# Patient Record
Sex: FEMALE | Race: WHITE | NOT HISPANIC OR LATINO | Employment: OTHER | URBAN - METROPOLITAN AREA
[De-identification: names, ages, dates, MRNs, and addresses within clinical notes are randomized per-mention and may not be internally consistent; named-entity substitution may affect disease eponyms.]

---

## 2017-01-04 ENCOUNTER — HOSPITAL ENCOUNTER (OUTPATIENT)
Dept: RADIATION ONCOLOGY | Facility: HOSPITAL | Age: 42
Setting detail: RADIATION/ONCOLOGY SERIES
Discharge: HOME | End: 2017-01-04

## 2017-01-06 PROCEDURE — 77300 RADIATION THERAPY DOSE PLAN: CPT | Performed by: RADIOLOGY

## 2017-01-06 PROCEDURE — 77334 RADIATION TREATMENT AID(S): CPT | Performed by: RADIOLOGY

## 2017-01-06 PROCEDURE — 77295 3-D RADIOTHERAPY PLAN: CPT | Performed by: RADIOLOGY

## 2017-01-06 PROCEDURE — 77370 RADIATION PHYSICS CONSULT: CPT | Performed by: RADIOLOGY

## 2017-01-11 ENCOUNTER — DOCUMENTATION (OUTPATIENT)
Dept: CARDIAC SURGERY | Facility: CLINIC | Age: 42
End: 2017-01-11

## 2017-01-11 ENCOUNTER — HOSPITAL ENCOUNTER (OUTPATIENT)
Dept: RADIATION ONCOLOGY | Facility: HOSPITAL | Age: 42
Discharge: HOME | End: 2017-01-11

## 2017-01-11 DIAGNOSIS — C71.9 GLIOMA (HCC): Primary | ICD-10-CM

## 2017-01-11 PROCEDURE — 77372 SRS LINEAR BASED: CPT | Performed by: RADIOLOGY

## 2017-01-11 PROCEDURE — 77290 THER RAD SIMULAJ FIELD CPLX: CPT | Performed by: RADIOLOGY

## 2017-01-11 NOTE — PROGRESS NOTES
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    Surgeon: David Gregorio M.D.    Indications for treatment:   This is a 41-year-old female who is approximately 8 years subsequent to initial diagnosis of a low-grade glioma.  She was initially treated with surgical excision and did quite well.  She subsequent had progression with reoperation for a small lesion that showed enhancement and followed by radiation.  Again she did well following the have progression and is admitted at this time for stereotactic radiosurgery.      The risks and benefits of this procedure have been discussed with the patient.  Consent forms were given and signatures obtained from the patient.  All questions have been answered satisfactorily and the patient wished to proceed with the procedure.    Description of procedure:     The patient was admitted to the CyberKnife suite, placed on the treatment table and an aquaplast mask was fitted to the calvarium to be used during treatment.    MRI and CT scanning data set were performed and entered into the CyberKnife planning and contouring station.  The tumor and surrounding critical structures including brainstem; visual apparatus; and lens of the eye were identified and coutoured to be excluded from the radiation plan.  The area of interest  right inferior frontal lobe  was identified and outlined.  A treatment plan was formulated in conjunction with the radiation oncologist and radiation therapist.  The patient received a total of 1800Gyc in a single fraction.    On the day of treatment, the patient returned to the CyberKnife suite and was placed on the treatment table.  The Aquaplast mask was reapplied and the coordinates for treatment using CT and skull x-rays were confirmed.  The  treatment time was 35 minutes] minutes.  The patient tolerated the procedure very well incurring no neurological dysfunction or complications.  The patient was discharged from the CyberKnife Suite to be followed by neurosurgery and radiation therapy.    Complications: none

## 2017-01-12 DIAGNOSIS — C71.1 MALIGNANT NEOPLASM OF FRONTAL LOBE (HCC): Primary | ICD-10-CM

## 2017-01-20 ENCOUNTER — CONSULT (OUTPATIENT)
Dept: ONCOLOGY | Facility: CLINIC | Age: 42
End: 2017-01-20

## 2017-01-20 VITALS
OXYGEN SATURATION: 98 % | HEIGHT: 62 IN | HEART RATE: 90 BPM | TEMPERATURE: 98 F | RESPIRATION RATE: 20 BRPM | SYSTOLIC BLOOD PRESSURE: 125 MMHG | BODY MASS INDEX: 47.7 KG/M2 | WEIGHT: 259.2 LBS | DIASTOLIC BLOOD PRESSURE: 83 MMHG

## 2017-01-20 DIAGNOSIS — C71.9 GLIOMA (HCC): Primary | ICD-10-CM

## 2017-01-20 LAB
ALBUMIN SERPL-MCNC: 4.1 G/DL (ref 3.5–5)
ALBUMIN/GLOB SERPL: 1.5 G/DL (ref 1.5–2.5)
ALP SERPL-CCNC: 78 U/L (ref 46–116)
ALT SERPL W P-5'-P-CCNC: 41 U/L (ref 10–36)
ANION GAP SERPL CALCULATED.3IONS-SCNC: 12.4 MMOL/L (ref 3.6–11.2)
AST SERPL-CCNC: 29 U/L (ref 10–30)
BASOPHILS # BLD AUTO: 0.03 10*3/MM3 (ref 0–0.3)
BASOPHILS NFR BLD AUTO: 0.4 % (ref 0–2)
BILIRUB SERPL-MCNC: 0.4 MG/DL (ref 0.2–1.8)
BUN BLD-MCNC: 16 MG/DL (ref 7–21)
BUN/CREAT SERPL: 18.4 (ref 7–25)
CALCIUM SPEC-SCNC: 9.4 MG/DL (ref 7.7–10)
CHLORIDE SERPL-SCNC: 101 MMOL/L (ref 99–112)
CO2 SERPL-SCNC: 26.6 MMOL/L (ref 24.3–31.9)
CREAT BLD-MCNC: 0.87 MG/DL (ref 0.43–1.29)
DEPRECATED RDW RBC AUTO: 40 FL (ref 37–54)
EOSINOPHIL # BLD AUTO: 0.19 10*3/MM3 (ref 0–0.7)
EOSINOPHIL NFR BLD AUTO: 2.4 % (ref 0–5)
ERYTHROCYTE [DISTWIDTH] IN BLOOD BY AUTOMATED COUNT: 13.7 % (ref 11.5–14.5)
GFR SERPL CREATININE-BSD FRML MDRD: 71 ML/MIN/1.73
GLOBULIN UR ELPH-MCNC: 2.8 GM/DL
GLUCOSE BLD-MCNC: 288 MG/DL (ref 70–110)
HCT VFR BLD AUTO: 43.6 % (ref 37–47)
HGB BLD-MCNC: 15.1 G/DL (ref 12–16)
IMM GRANULOCYTES # BLD: 0.11 10*3/MM3 (ref 0–0.03)
IMM GRANULOCYTES NFR BLD: 1.4 % (ref 0–0.5)
LYMPHOCYTES # BLD AUTO: 2.55 10*3/MM3 (ref 1–3)
LYMPHOCYTES NFR BLD AUTO: 32 % (ref 21–51)
MCH RBC QN AUTO: 28.1 PG (ref 27–33)
MCHC RBC AUTO-ENTMCNC: 34.6 G/DL (ref 33–37)
MCV RBC AUTO: 81 FL (ref 80–94)
MONOCYTES # BLD AUTO: 0.62 10*3/MM3 (ref 0.1–0.9)
MONOCYTES NFR BLD AUTO: 7.8 % (ref 0–10)
NEUTROPHILS # BLD AUTO: 4.46 10*3/MM3 (ref 1.4–6.5)
NEUTROPHILS NFR BLD AUTO: 56 % (ref 30–70)
OSMOLALITY SERPL CALC.SUM OF ELEC: 291.1 MOSM/KG (ref 273–305)
PLATELET # BLD AUTO: 263 10*3/MM3 (ref 130–400)
PMV BLD AUTO: 10.1 FL (ref 6–10)
POTASSIUM BLD-SCNC: 4.5 MMOL/L (ref 3.5–5.3)
PROT SERPL-MCNC: 6.9 G/DL (ref 6–8)
RBC # BLD AUTO: 5.38 10*6/MM3 (ref 4.2–5.4)
SODIUM BLD-SCNC: 140 MMOL/L (ref 135–153)
WBC NRBC COR # BLD: 7.96 10*3/MM3 (ref 4.5–12.5)

## 2017-01-20 PROCEDURE — 99205 OFFICE O/P NEW HI 60 MIN: CPT | Performed by: INTERNAL MEDICINE

## 2017-01-20 PROCEDURE — 85025 COMPLETE CBC W/AUTO DIFF WBC: CPT | Performed by: INTERNAL MEDICINE

## 2017-01-20 PROCEDURE — 80053 COMPREHEN METABOLIC PANEL: CPT | Performed by: INTERNAL MEDICINE

## 2017-01-20 RX ORDER — ONDANSETRON 4 MG/1
4 TABLET, FILM COATED ORAL EVERY 8 HOURS PRN
Qty: 60 TABLET | Refills: 11 | Status: ON HOLD | OUTPATIENT
Start: 2017-01-20 | End: 2017-06-22

## 2017-01-20 RX ORDER — SULFAMETHOXAZOLE AND TRIMETHOPRIM 800; 160 MG/1; MG/1
TABLET ORAL
Qty: 12 TABLET | Refills: 5 | Status: SHIPPED | OUTPATIENT
Start: 2017-01-20 | End: 2017-06-20

## 2017-01-20 RX ORDER — TEMOZOLOMIDE 140 MG/1
CAPSULE ORAL
Qty: 5 CAPSULE | Refills: 5 | Status: SHIPPED | OUTPATIENT
Start: 2017-01-20 | End: 2017-06-20

## 2017-01-20 RX ORDER — PROCHLORPERAZINE MALEATE 10 MG
10 TABLET ORAL EVERY 6 HOURS PRN
Qty: 60 TABLET | Refills: 11 | Status: ON HOLD | OUTPATIENT
Start: 2017-01-20 | End: 2017-06-22

## 2017-01-20 RX ORDER — TEMOZOLOMIDE 180 MG/1
CAPSULE ORAL
Qty: 5 CAPSULE | Refills: 5 | Status: SHIPPED | OUTPATIENT
Start: 2017-01-20 | End: 2017-06-20

## 2017-01-20 NOTE — MR AVS SNAPSHOT
[facility] HEMATOLOGY  AND ONCOLOGY  [phone]                    Marcelina Alvarez   1/20/2017 9:30 AM   Consult    Dept Phone:  [phone]   Encounter #:  [id number]    Provider:  JEFFY Pinto MD   Department:  [facility] HEMATOLOGY  AND ONCOLOGY                Your Full Care Plan              Your Updated Medication List          This list is accurate as of: 1/20/17 10:19 AM.  Always use your most recent med list.                ARIPiprazole 5 MG tablet   Commonly known as:  ABILIFY       dexamethasone 4 MG tablet   Commonly known as:  DECADRON   Take 0.5 tablets by mouth 2 (Two) Times a Day With Meals.       gabapentin 600 MG tablet   Commonly known as:  NEURONTIN       lisinopril 20 MG tablet   Commonly known as:  PRINIVIL,ZESTRIL       LORazepam 0.5 MG tablet   Commonly known as:  ATIVAN       raNITIdine 150 MG tablet   Commonly known as:  ZANTAC       sertraline 100 MG tablet   Commonly known as:  ZOLOFT       valACYclovir 500 MG tablet   Commonly known as:  VALTREX               We Performed the Following     CBC & Differential     CBC Auto Differential     Comprehensive Metabolic Panel       You Were Diagnosed With        Codes Comments    Glioma    -  Primary ICD-10-CM: C71.9  ICD-9-CM: 191.1       Instructions     None    Patient Instructions History      Upcoming Appointments     Visit Type Date Time Department    NEW PT - ONCOLOGY 1/20/2017  9:30 AM [facility] ON[facility]    CK FU< 6 MONTHS 2/15/2017 11:30 AM [facility] RAD ONC JANEL    MR JANEL BRAIN W WO CONTRAST 2/21/2017 10:00 AM [facility]EL MRI    OFFICE VISIT 2/21/2017 11:30 AM [facility] NEUROSURG [facility]    LAB 2/23/2017  9:45 AM [facility]    FOLLOW UP 2/23/2017 10:15 AM [facility]      [employer] Signup     [facility] [employer] allows you to send messages to your doctor, view your test results, renew your prescriptions, schedule appointments, and more. To sign up, go to [employer] and click on the Sign Up Now  link in the New User? box. Enter your [employer] Activation Code exactly as it appears below along with the last four digits of your Social Security Number and your Date of Birth () to complete the sign-up process. If you do not sign up before the expiration date, you must request a new code.    [employer] Activation Code: [email or web address]  Expires: 2/3/2017 10:19 AM    If you have questions, you can email [email or web address] or call [phone] to talk to our [employer] staff. Remember, [employer] is NOT to be used for urgent needs. For medical emergencies, dial 911.               Other Info from Your Visit           Your Appointments     Feb 15, 2017 11:30 AM EST   CK FU<6 months with Lisseth Mckeon MD   [facility] (--)    [address] [id number] [phone]            2017 10:00 AM EST   MR janel brain w wo contrast with JANEL MRI 1   [facility] ([place])    [address] [address] [phone]           Arrive 30 minutes prior to exam time. Bring a list of medications currently taking. Wear clothing without metal snaps, zippers, or other metalic artifacts. Do not wear jewelry.            2017 11:30 AM EST   (Arrive by 11:15 AM EST)   Office Visit with David Gregorio MD   [facility] (--)    [address],  [address] [facility] [address]  [facility] [id number] [phone]           Arrive 15 minutes prior to appointment.            2017  9:45 AM EST   LAB with MARK NURSE LAB   [facility] HEMATOLOGY  AND ONCOLOGY ([place])    [address] [address]   [phone]            2017 10:15 AM EST   Follow Up with JEFFY Pinto MD   [facility] HEMATOLOGY  AND ONCOLOGY ([facility])    [address] [address]   [phone]           Arrive 15 minutes  "prior to appointment.              Allergies     Diamox [Acetazolamide]        Vital Signs     Blood Pressure Pulse Temperature Respirations Height Weight    125/83 90 98 °F (36.7 °C) (Oral) 20 62\" (157.5 cm) 259 lb 3.2 oz (118 kg)    Oxygen Saturation Body Mass Index Smoking Status             98% 47.41 kg/m2 Never Smoker         Problems and Diagnoses Noted     Glioma        "

## 2017-01-20 NOTE — PROGRESS NOTES
Name:  Marcelina Alvarez  :  1975  Date:  2017     REFERRING PHYSICIAN  David Gregorio MD    PRIMARY CARE PROVIDER  TAYLOR Heard    REASON FOR FOLLOWUP  1. Glioma      CHIEF COMPLAINT  Occasional headaches.    Dear Dr. Gregorio,    HISTORY OF PRESENT ILLNESS:   Thank you for referring Ms. Alvarez to our medical oncology clinic.  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 She was initially diagnosed in , and you performed a successful resection. She subsequently underwent localized XRT (but no chemotherapy). She did very well until , when she developed a localized recurrence; and, again, you performed a successful resection (this time without adjuvant XRT). She was again doing well until just recently, late , when she started to develop some mild forgetfulness and headaches; and a repeat MRI showed evidence of recurrent disease again. This time, it was felt that repeat surgery was not in her best interest. She underwent Cyberknife treatment in early 2017 and tolerated it well. She is now referred to our clinic to consider adjuvant chemotherapy (Temodar).    INTERIM HISTORY:  Ms. Alvarez presents to clinic today for initial consultation by herself. She confirms the above history. She is still having occasional headaches, but overall feels to be in her usual state of health. She tolerated the Cyberknife therapy in early 2017 with no issues.    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     Social  History Main Topics   • Smoking status: Never Smoker   • Smokeless tobacco: Not on file   • Alcohol use No   • Drug use: No   • Sexual activity: Not on file     Other Topics Concern   • Not on file     Social History Narrative       Family History   Problem Relation Age of Onset   • Hypertension Father      pulmonary       Allergies   Allergen Reactions   • Diamox [Acetazolamide]        Current Outpatient Prescriptions   Medication Sig Dispense Refill   • ARIPiprazole (ABILIFY) 5 MG tablet TK 1 T PO QD  3   • dexamethasone (DECADRON) 4 MG tablet Take 0.5 tablets by mouth 2 (Two) Times a Day With Meals. 40 tablet 1   • gabapentin (NEURONTIN) 600 MG tablet Take 800 mg by mouth 3 (Three) Times a Day.     • lisinopril (PRINIVIL,ZESTRIL) 20 MG tablet Take 20 mg by mouth daily.     • LORazepam (ATIVAN) 0.5 MG tablet Take 0.5 mg by mouth Every 8 (Eight) Hours As Needed for anxiety.     • raNITIdine (ZANTAC) 150 MG tablet Take 150 mg by mouth 2 (Two) Times a Day.     • sertraline (ZOLOFT) 100 MG tablet TK 1 AND 1/2 TS PO QD  3   • valACYclovir (VALTREX) 500 MG tablet Take 500 mg by mouth 2 (Two) Times a Day.     • ondansetron (ZOFRAN) 4 MG tablet Take 1 tablet by mouth Every 8 (Eight) Hours As Needed for nausea or vomiting. 60 tablet 11   • prochlorperazine (COMPAZINE) 10 MG tablet Take 1 tablet by mouth Every 6 (Six) Hours As Needed for nausea or vomiting. 60 tablet 11   • sulfamethoxazole-trimethoprim (BACTRIM DS) 800-160 MG per tablet Take one tablet three times a week (Mon/Wed/Friday). 12 tablet 5   • temozolomide (TEMODAR) 140 MG chemo capsule Take one, 140 mg tablet in combination with one, 180 mg tablet for a total dose of 320 mg on days 1-5 of a 28-day cycle 5 capsule 5   • temozolomide (TEMODAR) 180 MG chemo capsule Take one, 180 mg tablet in combination with one, 140 mg tablet for a total dose of 320 mg on days 1-5 of a 28-day cycle 5 capsule 5     No current facility-administered medications for this visit.   "      REVIEW OF SYSTEMS  CONSTITUTIONAL:  No fever, chills or night sweats.  EYES:  No blurry vision, diplopia or other vision changes.  ENT:  No hearing loss, nosebleeds or sore throat.  CARDIOVASCULAR:  No palpitations, arrhythmia, syncopal episodes or edema.  PULMONARY:  No hemoptysis, wheezing, chronic cough or shortness of breath.  GASTROINTESTINAL:  No nausea or vomiting.  No constipation or diarrhea.  No abdominal pain.  GENITOURINARY:  No hematuria, kidney stones or frequent urination.  MUSCULOSKELETAL:  No joint or back pains.  INTEGUMENTARY: No rashes or pruritus.  ENDOCRINE:  No excessive thirst or hot flashes.  HEMATOLOGIC:  No history of free bleeding, spontaneous bleeding or clotting.  IMMUNOLOGIC:  No allergies or frequent infections.  NEUROLOGIC: No numbness, tingling, seizures or weakness. Occasional forgetfulness and headaches, as per the HPI above.  PSYCHIATRIC:  No anxiety or depression.    PHYSICAL EXAMINATION    Visit Vitals   • /83   • Pulse 90   • Temp 98 °F (36.7 °C) (Oral)   • Resp 20   • Ht 62\" (157.5 cm)   • Wt 259 lb 3.2 oz (118 kg)   • SpO2 98%   • BMI 47.41 kg/m2       GENERAL:  A well-developed, well-nourished, white female in no acute distress.  HEENT:  Pupils equally round and reactive to light.  Extraocular muscles intact.  CARDIOVASCULAR:  Regular rate and rhythm.  No murmurs, gallops or rubs.  LUNGS:  Clear to auscultation bilaterally.  ABDOMEN:  Soft, nontender, nondistended with positive bowel sounds.  EXTREMITIES:  No clubbing, cyanosis or edema bilaterally.  SKIN:  No rashes or petechiae.  NEURO:  Cranial nerves grossly intact.  No focal deficits.  PSYCH:  Alert and oriented x3.    LABORATORY    Lab Results   Component Value Date    WBC 7.96 01/20/2017    HGB 15.1 01/20/2017    HCT 43.6 01/20/2017    MCV 81.0 01/20/2017     01/20/2017    NEUTROABS 4.46 01/20/2017       Lab Results   Component Value Date     01/20/2017    K 4.5 01/20/2017     " "01/20/2017    CO2 26.6 01/20/2017    BUN 16 01/20/2017    CREATININE 0.87 01/20/2017    GLUCOSE 288 (H) 01/20/2017    CALCIUM 9.4 01/20/2017    AST 29 01/20/2017    ALT 41 (H) 01/20/2017    ALKPHOS 78 01/20/2017    BILITOT 0.4 01/20/2017    PROTEINTOT 6.9 01/20/2017    ALBUMIN 4.10 01/20/2017       IMAGING  MRI brain with and without contrast (06/13/2016):  Impression: There are bilateral frontal changes related to prior surgery, right greater than left. There is some associated gliosis and minimal contrast enhancement in the superior leftward aspect of the surgical \"bed\". Most importantly, there has been no change since 02/22/2016.    MRI cyberknife brain with contrast (12/28/2016):  Impression: Areas of increased blood flow in the right frontal lobe in areas of abnormal contrast enhancement and therefore the abnormality seen on prior MRI is highly suspicious for tumor.    PATHOLOGY    IMPRESSION AND PLAN  Ms. Alvarez is a 42 y.o., white female with:  Glioma: Initially diagnosed in 2007, with recurrences in 2014 and, most recently, late 2016. Status post resection x 2 (in 2007 and 2014) and localized radiation x 2 (adjuvantly in 2007; and, most recently, a Cyberknife boost delivered in January 2017). I had a long discussion with the patient in clinic today regarding her diagnosis. Since a third resection was felt to not be in her best interest and she has now completed localized XRT, I agree with neurosurgery's recommendation for adjuvant chemotherapy. I discussed the potential risks vs. benefits with her today, and she is agreeable to proceeding. Rxs for a total of 320 mg Temodar were provided, to be taken days 1-5 of a q28-day cycle. She will hopefully be able to start the first cycle within the next 1-2 weeks. Particularly given the risk for thrombocytopenia with this treatment, we will monitor her CBCs closely (at least weekly) for now. We will see her back in clinic in one month (just prior to the start of the " second, q28-day cycle) with a CBC and CMP for a toxicity check. She will continue to follow up with neurosurgery with periodic repeat MRIs, as previously planned. The patient was in agreement with these plans.    It is a pleasure to participate in Ms. Devlin's care. Please do not hesitate to call with any questions or concerns that you may have.    A total of 60 minutes were spent coordinating this patient’s care in clinic today; 50 minutes of which were face-to-face with the patient, reviewing her medical history and counseling on the current treatment and followup plan.  All questions were answered to her satisfaction.    FOLLOW UP  Rxs for Temodar 150 mg/m2/day (patient dose: 320 mg days 1-5 of a q28 day cycle), prophylactic Bactrim and prn Zofran and compazine all provided today. Check repeat CBCs and CMPs on days 3 and 5 of cycle #1 and then weekly for now. Return to clinic in one month, just prior to the start of the 2nd cycle, with a CBC and CMP.        This document was electronically signed by JEFFY Pinto MD January 20, 2017 11:13 AM      CC: MD Lisseth Becerra MD Beverly Paige Neace, PA

## 2017-01-24 DIAGNOSIS — C71.9 GLIOMA (HCC): Primary | ICD-10-CM

## 2017-01-24 RX ORDER — TEMOZOLOMIDE 180 MG/1
CAPSULE ORAL
Qty: 5 CAPSULE | Refills: 5 | Status: SHIPPED | OUTPATIENT
Start: 2017-01-24 | End: 2017-02-28

## 2017-01-24 RX ORDER — TEMOZOLOMIDE 140 MG/1
CAPSULE ORAL
Qty: 5 CAPSULE | Refills: 5 | Status: SHIPPED | OUTPATIENT
Start: 2017-01-24 | End: 2017-02-28

## 2017-02-01 ENCOUNTER — LAB (OUTPATIENT)
Dept: ONCOLOGY | Facility: CLINIC | Age: 42
End: 2017-02-01

## 2017-02-01 VITALS
DIASTOLIC BLOOD PRESSURE: 85 MMHG | WEIGHT: 258 LBS | TEMPERATURE: 97.9 F | OXYGEN SATURATION: 96 % | HEART RATE: 87 BPM | BODY MASS INDEX: 47.19 KG/M2 | SYSTOLIC BLOOD PRESSURE: 150 MMHG | RESPIRATION RATE: 20 BRPM

## 2017-02-01 DIAGNOSIS — C71.9 GLIOMA (HCC): ICD-10-CM

## 2017-02-01 LAB
ALBUMIN SERPL-MCNC: 3.9 G/DL (ref 3.5–5)
ALBUMIN/GLOB SERPL: 1.3 G/DL (ref 1.5–2.5)
ALP SERPL-CCNC: 77 U/L (ref 46–116)
ALT SERPL W P-5'-P-CCNC: 49 U/L (ref 10–36)
ANION GAP SERPL CALCULATED.3IONS-SCNC: 12.7 MMOL/L (ref 3.6–11.2)
AST SERPL-CCNC: 40 U/L (ref 10–30)
BASOPHILS # BLD AUTO: 0.04 10*3/MM3 (ref 0–0.3)
BASOPHILS NFR BLD AUTO: 0.4 % (ref 0–2)
BILIRUB SERPL-MCNC: 0.5 MG/DL (ref 0.2–1.8)
BUN BLD-MCNC: 14 MG/DL (ref 7–21)
BUN/CREAT SERPL: 15.6 (ref 7–25)
CALCIUM SPEC-SCNC: 8.6 MG/DL (ref 7.7–10)
CHLORIDE SERPL-SCNC: 105 MMOL/L (ref 99–112)
CO2 SERPL-SCNC: 24.3 MMOL/L (ref 24.3–31.9)
CREAT BLD-MCNC: 0.9 MG/DL (ref 0.43–1.29)
DEPRECATED RDW RBC AUTO: 41 FL (ref 37–54)
EOSINOPHIL # BLD AUTO: 0.09 10*3/MM3 (ref 0–0.7)
EOSINOPHIL NFR BLD AUTO: 1 % (ref 0–5)
ERYTHROCYTE [DISTWIDTH] IN BLOOD BY AUTOMATED COUNT: 13.8 % (ref 11.5–14.5)
GFR SERPL CREATININE-BSD FRML MDRD: 68 ML/MIN/1.73
GLOBULIN UR ELPH-MCNC: 2.9 GM/DL
GLUCOSE BLD-MCNC: 281 MG/DL (ref 70–110)
HCT VFR BLD AUTO: 44.5 % (ref 37–47)
HGB BLD-MCNC: 15.4 G/DL (ref 12–16)
IMM GRANULOCYTES # BLD: 0.07 10*3/MM3 (ref 0–0.03)
IMM GRANULOCYTES NFR BLD: 0.8 % (ref 0–0.5)
LYMPHOCYTES # BLD AUTO: 3.37 10*3/MM3 (ref 1–3)
LYMPHOCYTES NFR BLD AUTO: 37.8 % (ref 21–51)
MCH RBC QN AUTO: 28.4 PG (ref 27–33)
MCHC RBC AUTO-ENTMCNC: 34.6 G/DL (ref 33–37)
MCV RBC AUTO: 82 FL (ref 80–94)
MONOCYTES # BLD AUTO: 0.55 10*3/MM3 (ref 0.1–0.9)
MONOCYTES NFR BLD AUTO: 6.2 % (ref 0–10)
NEUTROPHILS # BLD AUTO: 4.8 10*3/MM3 (ref 1.4–6.5)
NEUTROPHILS NFR BLD AUTO: 53.8 % (ref 30–70)
OSMOLALITY SERPL CALC.SUM OF ELEC: 293.7 MOSM/KG (ref 273–305)
PLATELET # BLD AUTO: 268 10*3/MM3 (ref 130–400)
PMV BLD AUTO: 10.6 FL (ref 6–10)
POTASSIUM BLD-SCNC: 3.7 MMOL/L (ref 3.5–5.3)
PROT SERPL-MCNC: 6.8 G/DL (ref 6–8)
RBC # BLD AUTO: 5.43 10*6/MM3 (ref 4.2–5.4)
SODIUM BLD-SCNC: 142 MMOL/L (ref 135–153)
WBC NRBC COR # BLD: 8.92 10*3/MM3 (ref 4.5–12.5)

## 2017-02-01 PROCEDURE — 80053 COMPREHEN METABOLIC PANEL: CPT | Performed by: INTERNAL MEDICINE

## 2017-02-01 PROCEDURE — 36415 COLL VENOUS BLD VENIPUNCTURE: CPT

## 2017-02-01 PROCEDURE — 85025 COMPLETE CBC W/AUTO DIFF WBC: CPT | Performed by: INTERNAL MEDICINE

## 2017-02-03 ENCOUNTER — LAB (OUTPATIENT)
Dept: ONCOLOGY | Facility: CLINIC | Age: 42
End: 2017-02-03

## 2017-02-03 DIAGNOSIS — C71.9 GLIOMA (HCC): ICD-10-CM

## 2017-02-03 LAB
ALBUMIN SERPL-MCNC: 4.2 G/DL (ref 3.5–5)
ALBUMIN/GLOB SERPL: 1.4 G/DL (ref 1.5–2.5)
ALP SERPL-CCNC: 79 U/L (ref 46–116)
ALT SERPL W P-5'-P-CCNC: 90 U/L (ref 10–36)
ANION GAP SERPL CALCULATED.3IONS-SCNC: 8.1 MMOL/L (ref 3.6–11.2)
AST SERPL-CCNC: 75 U/L (ref 10–30)
BASOPHILS # BLD AUTO: 0.04 10*3/MM3 (ref 0–0.3)
BASOPHILS NFR BLD AUTO: 0.6 % (ref 0–2)
BILIRUB SERPL-MCNC: 0.6 MG/DL (ref 0.2–1.8)
BUN BLD-MCNC: 13 MG/DL (ref 7–21)
BUN/CREAT SERPL: 14.1 (ref 7–25)
CALCIUM SPEC-SCNC: 9.2 MG/DL (ref 7.7–10)
CHLORIDE SERPL-SCNC: 104 MMOL/L (ref 99–112)
CO2 SERPL-SCNC: 26.9 MMOL/L (ref 24.3–31.9)
CREAT BLD-MCNC: 0.92 MG/DL (ref 0.43–1.29)
DEPRECATED RDW RBC AUTO: 40.2 FL (ref 37–54)
EOSINOPHIL # BLD AUTO: 0.13 10*3/MM3 (ref 0–0.7)
EOSINOPHIL NFR BLD AUTO: 1.8 % (ref 0–5)
ERYTHROCYTE [DISTWIDTH] IN BLOOD BY AUTOMATED COUNT: 13.5 % (ref 11.5–14.5)
GFR SERPL CREATININE-BSD FRML MDRD: 67 ML/MIN/1.73
GLOBULIN UR ELPH-MCNC: 2.9 GM/DL
GLUCOSE BLD-MCNC: 293 MG/DL (ref 70–110)
HCT VFR BLD AUTO: 44.6 % (ref 37–47)
HGB BLD-MCNC: 15.6 G/DL (ref 12–16)
IMM GRANULOCYTES # BLD: 0.05 10*3/MM3 (ref 0–0.03)
IMM GRANULOCYTES NFR BLD: 0.7 % (ref 0–0.5)
LYMPHOCYTES # BLD AUTO: 2.02 10*3/MM3 (ref 1–3)
LYMPHOCYTES NFR BLD AUTO: 27.8 % (ref 21–51)
MCH RBC QN AUTO: 28.5 PG (ref 27–33)
MCHC RBC AUTO-ENTMCNC: 35 G/DL (ref 33–37)
MCV RBC AUTO: 81.5 FL (ref 80–94)
MONOCYTES # BLD AUTO: 0.43 10*3/MM3 (ref 0.1–0.9)
MONOCYTES NFR BLD AUTO: 5.9 % (ref 0–10)
NEUTROPHILS # BLD AUTO: 4.59 10*3/MM3 (ref 1.4–6.5)
NEUTROPHILS NFR BLD AUTO: 63.2 % (ref 30–70)
OSMOLALITY SERPL CALC.SUM OF ELEC: 288.5 MOSM/KG (ref 273–305)
PLATELET # BLD AUTO: 246 10*3/MM3 (ref 130–400)
PMV BLD AUTO: 9.8 FL (ref 6–10)
POTASSIUM BLD-SCNC: 4.1 MMOL/L (ref 3.5–5.3)
PROT SERPL-MCNC: 7.1 G/DL (ref 6–8)
RBC # BLD AUTO: 5.47 10*6/MM3 (ref 4.2–5.4)
SODIUM BLD-SCNC: 139 MMOL/L (ref 135–153)
WBC NRBC COR # BLD: 7.26 10*3/MM3 (ref 4.5–12.5)

## 2017-02-03 PROCEDURE — 80053 COMPREHEN METABOLIC PANEL: CPT | Performed by: INTERNAL MEDICINE

## 2017-02-03 PROCEDURE — 85025 COMPLETE CBC W/AUTO DIFF WBC: CPT | Performed by: INTERNAL MEDICINE

## 2017-02-03 PROCEDURE — 36415 COLL VENOUS BLD VENIPUNCTURE: CPT

## 2017-02-08 ENCOUNTER — LAB (OUTPATIENT)
Dept: ONCOLOGY | Facility: CLINIC | Age: 42
End: 2017-02-08

## 2017-02-08 VITALS
RESPIRATION RATE: 20 BRPM | BODY MASS INDEX: 47.15 KG/M2 | HEART RATE: 91 BPM | SYSTOLIC BLOOD PRESSURE: 137 MMHG | WEIGHT: 257.8 LBS | DIASTOLIC BLOOD PRESSURE: 81 MMHG | OXYGEN SATURATION: 97 % | TEMPERATURE: 97.9 F

## 2017-02-08 DIAGNOSIS — C71.9 GLIOMA (HCC): ICD-10-CM

## 2017-02-08 LAB
ALBUMIN SERPL-MCNC: 4.1 G/DL (ref 3.5–5)
ALBUMIN/GLOB SERPL: 1.5 G/DL (ref 1.5–2.5)
ALP SERPL-CCNC: 83 U/L (ref 46–116)
ALT SERPL W P-5'-P-CCNC: 83 U/L (ref 10–36)
ANION GAP SERPL CALCULATED.3IONS-SCNC: 9.5 MMOL/L (ref 3.6–11.2)
AST SERPL-CCNC: 64 U/L (ref 10–30)
BASOPHILS # BLD AUTO: 0.06 10*3/MM3 (ref 0–0.3)
BASOPHILS NFR BLD AUTO: 0.8 % (ref 0–2)
BILIRUB SERPL-MCNC: 0.3 MG/DL (ref 0.2–1.8)
BUN BLD-MCNC: 10 MG/DL (ref 7–21)
BUN/CREAT SERPL: 12.2 (ref 7–25)
CALCIUM SPEC-SCNC: 8.9 MG/DL (ref 7.7–10)
CHLORIDE SERPL-SCNC: 103 MMOL/L (ref 99–112)
CO2 SERPL-SCNC: 25.5 MMOL/L (ref 24.3–31.9)
CREAT BLD-MCNC: 0.82 MG/DL (ref 0.43–1.29)
DEPRECATED RDW RBC AUTO: 41.2 FL (ref 37–54)
EOSINOPHIL # BLD AUTO: 0.2 10*3/MM3 (ref 0–0.7)
EOSINOPHIL NFR BLD AUTO: 2.6 % (ref 0–5)
ERYTHROCYTE [DISTWIDTH] IN BLOOD BY AUTOMATED COUNT: 13.8 % (ref 11.5–14.5)
GFR SERPL CREATININE-BSD FRML MDRD: 76 ML/MIN/1.73
GLOBULIN UR ELPH-MCNC: 2.8 GM/DL
GLUCOSE BLD-MCNC: 345 MG/DL (ref 70–110)
HCT VFR BLD AUTO: 44.6 % (ref 37–47)
HGB BLD-MCNC: 15.4 G/DL (ref 12–16)
IMM GRANULOCYTES # BLD: 0.08 10*3/MM3 (ref 0–0.03)
IMM GRANULOCYTES NFR BLD: 1.1 % (ref 0–0.5)
LYMPHOCYTES # BLD AUTO: 2.86 10*3/MM3 (ref 1–3)
LYMPHOCYTES NFR BLD AUTO: 37.8 % (ref 21–51)
MCH RBC QN AUTO: 28.5 PG (ref 27–33)
MCHC RBC AUTO-ENTMCNC: 34.5 G/DL (ref 33–37)
MCV RBC AUTO: 82.6 FL (ref 80–94)
MONOCYTES # BLD AUTO: 0.43 10*3/MM3 (ref 0.1–0.9)
MONOCYTES NFR BLD AUTO: 5.7 % (ref 0–10)
NEUTROPHILS # BLD AUTO: 3.94 10*3/MM3 (ref 1.4–6.5)
NEUTROPHILS NFR BLD AUTO: 52 % (ref 30–70)
OSMOLALITY SERPL CALC.SUM OF ELEC: 288.4 MOSM/KG (ref 273–305)
PLATELET # BLD AUTO: 239 10*3/MM3 (ref 130–400)
PMV BLD AUTO: 10.4 FL (ref 6–10)
POTASSIUM BLD-SCNC: 4.3 MMOL/L (ref 3.5–5.3)
PROT SERPL-MCNC: 6.9 G/DL (ref 6–8)
RBC # BLD AUTO: 5.4 10*6/MM3 (ref 4.2–5.4)
SODIUM BLD-SCNC: 138 MMOL/L (ref 135–153)
WBC NRBC COR # BLD: 7.57 10*3/MM3 (ref 4.5–12.5)

## 2017-02-08 PROCEDURE — 85025 COMPLETE CBC W/AUTO DIFF WBC: CPT | Performed by: INTERNAL MEDICINE

## 2017-02-08 PROCEDURE — 80053 COMPREHEN METABOLIC PANEL: CPT | Performed by: INTERNAL MEDICINE

## 2017-02-09 ENCOUNTER — TELEPHONE (OUTPATIENT)
Dept: NEUROSURGERY | Facility: CLINIC | Age: 42
End: 2017-02-09

## 2017-02-09 DIAGNOSIS — C71.9 GLIOMA (HCC): Primary | ICD-10-CM

## 2017-02-09 NOTE — TELEPHONE ENCOUNTER
----- Message from Ary Carlson sent at 2/9/2017 10:00 AM EST -----  Regarding: MRI ORDER/ROXY  Message;  Patient is scheduled 2/21/17 for an MRI brain w/wo. It needs to be with CyberKnife protocol. I called central scheduling to add that, but they said a new order would need to be put in with CyberKnife on it. Can someone put in a new order, please? Thanks.

## 2017-02-15 ENCOUNTER — HOSPITAL ENCOUNTER (OUTPATIENT)
Dept: RADIATION ONCOLOGY | Facility: HOSPITAL | Age: 42
Setting detail: RADIATION/ONCOLOGY SERIES
Discharge: HOME | End: 2017-02-15

## 2017-02-15 ENCOUNTER — OFFICE VISIT (OUTPATIENT)
Dept: RADIATION ONCOLOGY | Facility: HOSPITAL | Age: 42
End: 2017-02-15

## 2017-02-15 VITALS
DIASTOLIC BLOOD PRESSURE: 86 MMHG | TEMPERATURE: 97.9 F | HEIGHT: 62 IN | RESPIRATION RATE: 18 BRPM | BODY MASS INDEX: 47.37 KG/M2 | HEART RATE: 73 BPM | SYSTOLIC BLOOD PRESSURE: 136 MMHG | WEIGHT: 257.4 LBS

## 2017-02-15 DIAGNOSIS — C71.9 GLIOMA (HCC): Primary | ICD-10-CM

## 2017-02-15 PROCEDURE — G0463 HOSPITAL OUTPT CLINIC VISIT: HCPCS

## 2017-02-15 RX ORDER — GABAPENTIN 800 MG/1
800 TABLET ORAL 3 TIMES DAILY
COMMUNITY
Start: 2017-01-17

## 2017-02-15 RX ORDER — IBUPROFEN 800 MG/1
TABLET ORAL
Refills: 5 | COMMUNITY
Start: 2017-01-22 | End: 2017-06-20

## 2017-02-15 NOTE — PROGRESS NOTES
"FOLLOW UP NOTE    PATIENT:                                                      Marcelina Alvarez  MEDICAL RECORD #:                        [id number]  :                                                          1975  COMPLETION DATE:   2017  DIAGNOSIS:     Recurrent Glioma      BRIEF HISTORY:    This is Kim is a 42-year-old female who was found have recurrent glioma.  She underwent CyberKnife stereotactic radiosurgery to recurrent tumor on 2017 and received 18 Gray in 1 fraction.  She is here for follow-up and says her symptoms have improved    MEDICATIONS: Medication reconciliation for the patient was reviewed and confirmed in the electronic medical record.    Review of Systems   All other systems reviewed and are negative.      KPS 90%    Physical Exam   Constitutional: She is oriented to person, place, and time.   HENT:   Head: Normocephalic.   Eyes: EOM are normal. No scleral icterus.   Cardiovascular: Normal rate, regular rhythm and normal heart sounds.    Pulmonary/Chest: Effort normal and breath sounds normal.   Neurological: She is alert and oriented to person, place, and time. No cranial nerve deficit.   Nursing note and vitals reviewed.      VITAL SIGNS:   Vitals:    02/15/17 1105   BP: 136/86   Pulse: 73   Resp: 18   Temp: 97.9 °F (36.6 °C)   Weight: 257 lb 6.4 oz (117 kg)   Height: 62\" (157.5 cm)   PainSc: 0-No pain       The following portions of the patient's history were reviewed and updated as appropriate: allergies, current medications, past family history, past medical history, past social history, past surgical history and problem list.         No primary diagnosis found.    IMPRESSION:  No acute side effects of radiotherapy and clinically the patient is better    RECOMMENDATIONS:  Mrs. Alvarez will continue Temodar chemotherapy.  We will see her back as needed.  She plans to follow closely with Dr. Gregorio     Return for PRN.     Lisseth Mckeon MD    Errors in dictation may reflect " use of voice recognition software and not all errors in transcription may have been detected prior to signing.

## 2017-02-16 ENCOUNTER — LAB (OUTPATIENT)
Dept: ONCOLOGY | Facility: CLINIC | Age: 42
End: 2017-02-16

## 2017-02-16 VITALS
BODY MASS INDEX: 47.41 KG/M2 | SYSTOLIC BLOOD PRESSURE: 134 MMHG | OXYGEN SATURATION: 98 % | RESPIRATION RATE: 18 BRPM | WEIGHT: 259.2 LBS | DIASTOLIC BLOOD PRESSURE: 86 MMHG | HEART RATE: 91 BPM | TEMPERATURE: 96.9 F

## 2017-02-16 DIAGNOSIS — C71.9 GLIOMA (HCC): ICD-10-CM

## 2017-02-16 LAB
ALBUMIN SERPL-MCNC: 4.2 G/DL (ref 3.5–5)
ALBUMIN/GLOB SERPL: 1.4 G/DL (ref 1.5–2.5)
ALP SERPL-CCNC: 88 U/L (ref 35–104)
ALT SERPL W P-5'-P-CCNC: 67 U/L (ref 10–36)
ANION GAP SERPL CALCULATED.3IONS-SCNC: 8.7 MMOL/L (ref 3.6–11.2)
AST SERPL-CCNC: 48 U/L (ref 10–30)
BASOPHILS # BLD AUTO: 0.04 10*3/MM3 (ref 0–0.3)
BASOPHILS NFR BLD AUTO: 0.5 % (ref 0–2)
BILIRUB SERPL-MCNC: 0.4 MG/DL (ref 0.2–1.8)
BUN BLD-MCNC: 10 MG/DL (ref 7–21)
BUN/CREAT SERPL: 10.8 (ref 7–25)
CALCIUM SPEC-SCNC: 9.3 MG/DL (ref 7.7–10)
CHLORIDE SERPL-SCNC: 102 MMOL/L (ref 99–112)
CO2 SERPL-SCNC: 26.3 MMOL/L (ref 24.3–31.9)
CREAT BLD-MCNC: 0.93 MG/DL (ref 0.43–1.29)
DEPRECATED RDW RBC AUTO: 40.5 FL (ref 37–54)
EOSINOPHIL # BLD AUTO: 0.21 10*3/MM3 (ref 0–0.7)
EOSINOPHIL NFR BLD AUTO: 2.6 % (ref 0–5)
ERYTHROCYTE [DISTWIDTH] IN BLOOD BY AUTOMATED COUNT: 14.2 % (ref 11.5–14.5)
GFR SERPL CREATININE-BSD FRML MDRD: 66 ML/MIN/1.73
GLOBULIN UR ELPH-MCNC: 2.9 GM/DL
GLUCOSE BLD-MCNC: 352 MG/DL (ref 70–110)
HCT VFR BLD AUTO: 44.8 % (ref 37–47)
HGB BLD-MCNC: 15.6 G/DL (ref 12–16)
IMM GRANULOCYTES # BLD: 0.05 10*3/MM3 (ref 0–0.03)
IMM GRANULOCYTES NFR BLD: 0.6 % (ref 0–0.5)
LYMPHOCYTES # BLD AUTO: 2.82 10*3/MM3 (ref 1–3)
LYMPHOCYTES NFR BLD AUTO: 35.1 % (ref 21–51)
MCH RBC QN AUTO: 28.3 PG (ref 27–33)
MCHC RBC AUTO-ENTMCNC: 34.8 G/DL (ref 33–37)
MCV RBC AUTO: 81.3 FL (ref 80–94)
MONOCYTES # BLD AUTO: 0.51 10*3/MM3 (ref 0.1–0.9)
MONOCYTES NFR BLD AUTO: 6.4 % (ref 0–10)
NEUTROPHILS # BLD AUTO: 4.4 10*3/MM3 (ref 1.4–6.5)
NEUTROPHILS NFR BLD AUTO: 54.8 % (ref 30–70)
OSMOLALITY SERPL CALC.SUM OF ELEC: 286.9 MOSM/KG (ref 273–305)
PLATELET # BLD AUTO: 341 10*3/MM3 (ref 130–400)
PMV BLD AUTO: 10.1 FL (ref 6–10)
POTASSIUM BLD-SCNC: 4.5 MMOL/L (ref 3.5–5.3)
PROT SERPL-MCNC: 7.1 G/DL (ref 6–8)
RBC # BLD AUTO: 5.51 10*6/MM3 (ref 4.2–5.4)
SODIUM BLD-SCNC: 137 MMOL/L (ref 135–153)
WBC NRBC COR # BLD: 8.03 10*3/MM3 (ref 4.5–12.5)

## 2017-02-16 PROCEDURE — 85025 COMPLETE CBC W/AUTO DIFF WBC: CPT | Performed by: INTERNAL MEDICINE

## 2017-02-16 PROCEDURE — 36415 COLL VENOUS BLD VENIPUNCTURE: CPT

## 2017-02-16 PROCEDURE — 80053 COMPREHEN METABOLIC PANEL: CPT | Performed by: INTERNAL MEDICINE

## 2017-02-21 ENCOUNTER — APPOINTMENT (OUTPATIENT)
Dept: MRI IMAGING | Facility: HOSPITAL | Age: 42
End: 2017-02-21

## 2017-02-21 RX ORDER — FLUCONAZOLE 100 MG/1
100 TABLET ORAL DAILY
Qty: 7 TABLET | Refills: 0 | Status: SHIPPED | OUTPATIENT
Start: 2017-02-21 | End: 2017-02-28

## 2017-02-23 ENCOUNTER — OFFICE VISIT (OUTPATIENT)
Dept: ONCOLOGY | Facility: CLINIC | Age: 42
End: 2017-02-23

## 2017-02-23 ENCOUNTER — LAB (OUTPATIENT)
Dept: ONCOLOGY | Facility: CLINIC | Age: 42
End: 2017-02-23

## 2017-02-23 VITALS
DIASTOLIC BLOOD PRESSURE: 79 MMHG | SYSTOLIC BLOOD PRESSURE: 136 MMHG | BODY MASS INDEX: 47.12 KG/M2 | TEMPERATURE: 98 F | HEART RATE: 79 BPM | RESPIRATION RATE: 18 BRPM | WEIGHT: 257.6 LBS | OXYGEN SATURATION: 98 %

## 2017-02-23 DIAGNOSIS — C71.9 GLIOMA (HCC): ICD-10-CM

## 2017-02-23 LAB
ALBUMIN SERPL-MCNC: 4.1 G/DL (ref 3.5–5)
ALBUMIN/GLOB SERPL: 1.5 G/DL (ref 1.5–2.5)
ALP SERPL-CCNC: 73 U/L (ref 35–104)
ALT SERPL W P-5'-P-CCNC: 64 U/L (ref 10–36)
ANION GAP SERPL CALCULATED.3IONS-SCNC: 7.1 MMOL/L (ref 3.6–11.2)
AST SERPL-CCNC: 49 U/L (ref 10–30)
BASOPHILS # BLD AUTO: 0.02 10*3/MM3 (ref 0–0.3)
BASOPHILS NFR BLD AUTO: 0.3 % (ref 0–2)
BILIRUB SERPL-MCNC: 0.6 MG/DL (ref 0.2–1.8)
BUN BLD-MCNC: 9 MG/DL (ref 7–21)
BUN/CREAT SERPL: 10.6 (ref 7–25)
CALCIUM SPEC-SCNC: 9 MG/DL (ref 7.7–10)
CHLORIDE SERPL-SCNC: 105 MMOL/L (ref 99–112)
CO2 SERPL-SCNC: 24.9 MMOL/L (ref 24.3–31.9)
CREAT BLD-MCNC: 0.85 MG/DL (ref 0.43–1.29)
DEPRECATED RDW RBC AUTO: 42.3 FL (ref 37–54)
EOSINOPHIL # BLD AUTO: 0.11 10*3/MM3 (ref 0–0.7)
EOSINOPHIL NFR BLD AUTO: 1.7 % (ref 0–5)
ERYTHROCYTE [DISTWIDTH] IN BLOOD BY AUTOMATED COUNT: 14.1 % (ref 11.5–14.5)
GFR SERPL CREATININE-BSD FRML MDRD: 73 ML/MIN/1.73
GLOBULIN UR ELPH-MCNC: 2.7 GM/DL
GLUCOSE BLD-MCNC: 322 MG/DL (ref 70–110)
HCT VFR BLD AUTO: 43.9 % (ref 37–47)
HGB BLD-MCNC: 15.4 G/DL (ref 12–16)
IMM GRANULOCYTES # BLD: 0.04 10*3/MM3 (ref 0–0.03)
IMM GRANULOCYTES NFR BLD: 0.6 % (ref 0–0.5)
LYMPHOCYTES # BLD AUTO: 2.51 10*3/MM3 (ref 1–3)
LYMPHOCYTES NFR BLD AUTO: 39.2 % (ref 21–51)
MCH RBC QN AUTO: 29.1 PG (ref 27–33)
MCHC RBC AUTO-ENTMCNC: 35.1 G/DL (ref 33–37)
MCV RBC AUTO: 83 FL (ref 80–94)
MONOCYTES # BLD AUTO: 0.46 10*3/MM3 (ref 0.1–0.9)
MONOCYTES NFR BLD AUTO: 7.2 % (ref 0–10)
NEUTROPHILS # BLD AUTO: 3.26 10*3/MM3 (ref 1.4–6.5)
NEUTROPHILS NFR BLD AUTO: 51 % (ref 30–70)
OSMOLALITY SERPL CALC.SUM OF ELEC: 284.9 MOSM/KG (ref 273–305)
PLATELET # BLD AUTO: 248 10*3/MM3 (ref 130–400)
PMV BLD AUTO: 10.3 FL (ref 6–10)
POTASSIUM BLD-SCNC: 4.4 MMOL/L (ref 3.5–5.3)
PROT SERPL-MCNC: 6.8 G/DL (ref 6–8)
RBC # BLD AUTO: 5.29 10*6/MM3 (ref 4.2–5.4)
SODIUM BLD-SCNC: 137 MMOL/L (ref 135–153)
WBC NRBC COR # BLD: 6.4 10*3/MM3 (ref 4.5–12.5)

## 2017-02-23 PROCEDURE — 85025 COMPLETE CBC W/AUTO DIFF WBC: CPT | Performed by: INTERNAL MEDICINE

## 2017-02-23 PROCEDURE — 99214 OFFICE O/P EST MOD 30 MIN: CPT | Performed by: INTERNAL MEDICINE

## 2017-02-23 PROCEDURE — 80053 COMPREHEN METABOLIC PANEL: CPT | Performed by: INTERNAL MEDICINE

## 2017-02-23 NOTE — PROGRESS NOTES
Name:  Marcelina Alvarez  :  1975  Date:  2017     REFERRING PHYSICIAN  David Gregorio MD    PRIMARY CARE PROVIDER  TAYLOR Heard    REASON FOR FOLLOWUP  1. Glioma      CHIEF COMPLAINT  None.    Dear Dr. Gregorio,    HISTORY OF PRESENT ILLNESS:   I saw Ms. Alvarez in follow up today in our medical oncology clinic. 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 She was initially diagnosed in , and you performed a successful resection. She subsequently underwent localized XRT (but no chemotherapy). She did very well until , when she developed a localized recurrence; and, again, you performed a successful resection (this time without adjuvant XRT). She was again doing well until late , when she started to develop some mild forgetfulness and headaches; and a repeat MRI showed evidence of recurrent disease again. This time, it was felt that repeat surgery was not in her best interest. She underwent Cyberknife treatment in early 2017 and tolerated it well. She was subsequently referred to our clinic to consider chemotherapy (Temodar). At the time of her initial appointment with us (on 2017), she was agreeable to this treatment (patient dose: 320 mg on days 1-5 of a 28-day cycle).    INTERIM HISTORY:  Ms. Alvarez presents to clinic today for follow up by herself. She tolerated the Cyberknife therapy in early 2017 with no issues. Her headaches resolved following this treatment. She took her first cycle (days 1-5) of Temodar from  - 2017 and overall tolerated it well also, with some mild and manageable episodes of nausea that week (and for a day or two after finishing Temodar) her only noticeable side effect. She overall feels well and has no specific complaints in clinic today.    Past Medical History   Diagnosis Date   • Brain tumor    • Hypertension    • Nerve damage       from shingles       Past Surgical History   Procedure Laterality Date   • Craniotomy  [id number]   • Cholecystectomy     • Endometrial ablation         Social History     Social History   • Marital status:      Spouse name: N/A   • Number of children: N/A   • Years of education: N/A     Occupational History   • Not on file.     Social History Main Topics   • Smoking status: Never Smoker   • Smokeless tobacco: Not on file   • Alcohol use No   • Drug use: No   • Sexual activity: Not on file     Other Topics Concern   • Not on file     Social History Narrative       Family History   Problem Relation Age of Onset   • Hypertension Father      pulmonary       Allergies   Allergen Reactions   • Diamox [Acetazolamide]        Current Outpatient Prescriptions   Medication Sig Dispense Refill   • ARIPiprazole (ABILIFY) 5 MG tablet TK 1 T PO QD  3   • dexamethasone (DECADRON) 4 MG tablet Take 0.5 tablets by mouth 2 (Two) Times a Day With Meals. 40 tablet 1   • fluconazole (DIFLUCAN) 100 MG tablet Take 1 tablet by mouth Daily. 7 tablet 0   • gabapentin (NEURONTIN) 800 MG tablet      • ibuprofen (ADVIL,MOTRIN) 800 MG tablet TK 1 T PO TID PRN  5   • lisinopril (PRINIVIL,ZESTRIL) 20 MG tablet Take 20 mg by mouth daily.     • LORazepam (ATIVAN) 0.5 MG tablet Take 0.5 mg by mouth Every 8 (Eight) Hours As Needed for anxiety.     • ondansetron (ZOFRAN) 4 MG tablet Take 1 tablet by mouth Every 8 (Eight) Hours As Needed for nausea or vomiting. 60 tablet 11   • prochlorperazine (COMPAZINE) 10 MG tablet Take 1 tablet by mouth Every 6 (Six) Hours As Needed for nausea or vomiting. 60 tablet 11   • raNITIdine (ZANTAC) 150 MG tablet Take 150 mg by mouth 2 (Two) Times a Day.     • sertraline (ZOLOFT) 100 MG tablet TK 1 AND 1/2 TS PO QD  3   • sulfamethoxazole-trimethoprim (BACTRIM DS) 800-160 MG per tablet Take one tablet three times a week (Mon/Wed/Friday). 12 tablet 5   • temozolomide (TEMODAR) 140 MG chemo capsule Take one capsule  on days 1-5 of a 28-day cycle (along with 180 mg for total dose of 320 mg). 5 capsule 5   • temozolomide (TEMODAR) 140 MG chemo capsule Take one 140 mg capsule in combination with one 180 mg capsule to equal total dose of 320 mg for days 1-5 of q28 day cycle 5 capsule 5   • temozolomide (TEMODAR) 180 MG chemo capsule Take one, 180 mg tablet in combination with one, 140 mg tablet for a total dose of 320 mg on days 1-5 of a 28-day cycle 5 capsule 5   • temozolomide (TEMODAR) 180 MG chemo capsule Take one 180 mg capsule in combination with one 140 mg capsule to equal a total of 320 mg for days 1-5 of a q28 day cycle 5 capsule 5   • valACYclovir (VALTREX) 500 MG tablet Take 500 mg by mouth 2 (Two) Times a Day.       No current facility-administered medications for this visit.        REVIEW OF SYSTEMS  CONSTITUTIONAL:  No fever, chills or night sweats.  EYES:  No blurry vision, diplopia or other vision changes.  ENT:  No hearing loss, nosebleeds or sore throat.  CARDIOVASCULAR:  No palpitations, arrhythmia, syncopal episodes or edema.  PULMONARY:  No hemoptysis, wheezing, chronic cough or shortness of breath.  GASTROINTESTINAL:  No constipation or diarrhea.  No abdominal pain. Mild, and manageable, nausea with Temodar (none for the remainder of the 28-day cycle).  GENITOURINARY:  No hematuria, kidney stones or frequent urination.  MUSCULOSKELETAL:  No joint or back pains.  INTEGUMENTARY: No rashes or pruritus.  ENDOCRINE:  No excessive thirst or hot flashes.  HEMATOLOGIC:  No history of free bleeding, spontaneous bleeding or clotting.  IMMUNOLOGIC:  No allergies or frequent infections.  NEUROLOGIC: No numbness, tingling, seizures or weakness. Resolved forgetfulness and headaches following Cyberknife therapy per the HPI above.  PSYCHIATRIC:  No anxiety or depression.    PHYSICAL EXAMINATION    Visit Vitals   • /79   • Pulse 79   • Temp 98 °F (36.7 °C) (Oral)   • Resp 18   • Wt 257 lb 9.6 oz (117 kg)   • SpO2 98%   •  "BMI 47.12 kg/m2       GENERAL:  A well-developed, well-nourished, obese, white female in no acute distress.  HEENT:  Pupils equally round and reactive to light.  Extraocular muscles intact.  CARDIOVASCULAR:  Regular rate and rhythm.  No murmurs, gallops or rubs.  LUNGS:  Clear to auscultation bilaterally.  ABDOMEN:  Soft, nontender, nondistended with positive bowel sounds.  EXTREMITIES:  No clubbing, cyanosis or edema bilaterally.  SKIN:  No rashes or petechiae.  NEURO:  Cranial nerves grossly intact.  No focal deficits.  PSYCH:  Alert and oriented x3.    LABORATORY    Lab Results   Component Value Date    WBC 6.40 02/23/2017    HGB 15.4 02/23/2017    HCT 43.9 02/23/2017    MCV 83.0 02/23/2017     02/23/2017    NEUTROABS 3.26 02/23/2017       Lab Results   Component Value Date     02/23/2017    K 4.4 02/23/2017     02/23/2017    CO2 24.9 02/23/2017    BUN 9 02/23/2017    CREATININE 0.85 02/23/2017    GLUCOSE 322 (H) 02/23/2017    CALCIUM 9.0 02/23/2017    AST 49 (H) 02/23/2017    ALT 64 (H) 02/23/2017    ALKPHOS 73 02/23/2017    BILITOT 0.6 02/23/2017    PROTEINTOT 6.8 02/23/2017    ALBUMIN 4.10 02/23/2017       CBC (02/23/2017): WBCs: 6.4; HgB: 15.4; Hct: 43.9; platelets: 248    IMAGING  MRI brain with and without contrast (06/13/2016):  Impression: There are bilateral frontal changes related to prior surgery, right greater than left. There is some associated gliosis and minimal contrast enhancement in the superior leftward aspect of the surgical \"bed\". Most importantly, there has been no change since 02/22/2016.    MRI cyberknife brain with contrast (12/28/2016):  Impression: Areas of increased blood flow in the right frontal lobe in areas of abnormal contrast enhancement and therefore the abnormality seen on prior MRI is highly suspicious for tumor.    PATHOLOGY    IMPRESSION AND PLAN  Ms. Alvarez is a 42 y.o., white female with:  1. Glioma: Initially diagnosed in 2007, with recurrences in 2014 " and, most recently, late 2016. Status post resection x 2 (in 2007 and 2014) and localized radiation x 2 (adjuvantly in 2007; and, most recently, a Cyberknife boost delivered in January 2017). I had a long discussion with the patient in clinic at the time of her initial appointment in our clinic (on 01/20/2017) regarding her diagnosis. Since a third resection was felt to not be in her best interest and she completed localized XRT, we agreed with neurosurgery's recommendation to subsequently start adjuvant chemotherapy. She began Temodar (patient dose: 320 mg daily on days 1-5 of a 28 day cycle) by late January 2017, taking her pills from 01/30 to 02/03/2017. She tolerated this treatment very well, with some mild, and manageable nausea her only noticeable side effect. Her CBCs have remained unremarkable, with no developing cytopenias (including today's). We will proceed with this current treatment plan (days 1-5 of cycle #2 scheduled for 02/27 - 03/03/2017), continue to monitor her CBCs routinely ([id number] for now) and see her back in clinic in one month (just prior to the start of the third cycle) for another symptom/toxicity check. She will also follow up with neurosurgery next week with a repeat MRI, as previously planned.  2. Nausea: Mild and manageable. The patient was advised to take Zofran scheduled once or twice a day (whether she feels nauseated or not) during the five days she takes Temodar (and perhaps for a day or two afterwards) to see if this might prevent some of her symptoms. Continue to monitor.  The patient was in agreement with these plans.    It is a pleasure to participate in Ms. Devlin's care. Please do not hesitate to call with any questions or concerns that you may have.    A total of 30 minutes were spent coordinating this patient’s care in clinic today; 25 minutes of which were face-to-face with the patient, reviewing her interim medical history, discussing the results of recent lab work and  counseling on the current treatment and followup plan.  All questions were answered to her satisfaction.    FOLLOW UP  Proceed with Temodar 150 mg/m2/day (patient dose: 320 mg days 1-5 of a q28 day cycle), prophylactic Bactrim and prn Zofran (day 1 of cycle #2 scheduled for 02/27/2017). Check repeat CBCs and CMPs [id number] for now. With neurosurgery with a repeat MRI next week, as previously planned. Return to our clinic in one month, just prior to the start of the 3rd cycle, with a CBC and CMP.        This document was electronically signed by JEFFY Pinto MD February 23, 2017 10:19 AM      CC: MD Lisseth Becerra MD Beverly Paige Neace, PA

## 2017-02-28 ENCOUNTER — HOSPITAL ENCOUNTER (OUTPATIENT)
Dept: MRI IMAGING | Facility: HOSPITAL | Age: 42
Discharge: HOME | End: 2017-02-28
Admitting: PHYSICIAN ASSISTANT

## 2017-02-28 ENCOUNTER — OFFICE VISIT (OUTPATIENT)
Dept: NEUROSURGERY | Facility: CLINIC | Age: 42
End: 2017-02-28

## 2017-02-28 VITALS
SYSTOLIC BLOOD PRESSURE: 146 MMHG | HEIGHT: 62 IN | WEIGHT: 256 LBS | DIASTOLIC BLOOD PRESSURE: 90 MMHG | TEMPERATURE: 98 F | BODY MASS INDEX: 47.11 KG/M2

## 2017-02-28 DIAGNOSIS — C71.9 GLIOMA (HCC): Primary | ICD-10-CM

## 2017-02-28 DIAGNOSIS — C71.9 GLIOMA (HCC): ICD-10-CM

## 2017-02-28 PROCEDURE — 0 GADOBENATE DIMEGLUMINE 529 MG/ML SOLUTION: Performed by: PHYSICIAN ASSISTANT

## 2017-02-28 PROCEDURE — 70553 MRI BRAIN STEM W/O & W/DYE: CPT

## 2017-02-28 PROCEDURE — A9577 INJ MULTIHANCE: HCPCS | Performed by: PHYSICIAN ASSISTANT

## 2017-02-28 PROCEDURE — 99024 POSTOP FOLLOW-UP VISIT: CPT | Performed by: NEUROLOGICAL SURGERY

## 2017-02-28 RX ADMIN — GADOBENATE DIMEGLUMINE 20 ML: 529 INJECTION, SOLUTION INTRAVENOUS at 11:30

## 2017-02-28 NOTE — PROGRESS NOTES
Marcelina Alvarez  1975  [id number]                       CURRENT WORKING DIAGNOSIS:  [Left frontal glioma ]         MEDICAL HISTORY SINCE LAST ENCOUNTER:  [ This 42-year-old female has had SRS and currently is on TMZ.  Neurologically she is doing well.  She takes no dexamethasone.  She denies headache.  She is here for follow-up.]           Past Medical History   Diagnosis Date   • Brain tumor    • Hypertension    • Nerve damage      from shingles              Past Surgical History   Procedure Laterality Date   • Craniotomy  [id number]   • Cholecystectomy     • Endometrial ablation                Family History   Problem Relation Age of Onset   • Hypertension Father      pulmonary              Social History     Social History   • Marital status:      Spouse name: N/A   • Number of children: N/A   • Years of education: N/A     Occupational History   • Not on file.     Social History Main Topics   • Smoking status: Never Smoker   • Smokeless tobacco: Not on file   • Alcohol use No   • Drug use: No   • Sexual activity: Not on file     Other Topics Concern   • Not on file     Social History Narrative              Allergies   Allergen Reactions   • Diamox [Acetazolamide]               Current Outpatient Prescriptions:   •  ARIPiprazole (ABILIFY) 5 MG tablet, TK 1 T PO QD, Disp: , Rfl: 3  •  gabapentin (NEURONTIN) 800 MG tablet, , Disp: , Rfl:   •  ibuprofen (ADVIL,MOTRIN) 800 MG tablet, TK 1 T PO TID PRN, Disp: , Rfl: 5  •  lisinopril (PRINIVIL,ZESTRIL) 20 MG tablet, Take 20 mg by mouth daily., Disp: , Rfl:   •  LORazepam (ATIVAN) 0.5 MG tablet, Take 0.5 mg by mouth Every 8 (Eight) Hours As Needed for anxiety., Disp: , Rfl:   •  ondansetron (ZOFRAN) 4 MG tablet, Take 1 tablet by mouth Every 8 (Eight) Hours As Needed for nausea or vomiting., Disp: 60 tablet, Rfl: 11  •  prochlorperazine (COMPAZINE) 10 MG tablet, Take 1 tablet by mouth Every 6 (Six) Hours As Needed for nausea or vomiting., Disp: 60 tablet, Rfl:  11  •  raNITIdine (ZANTAC) 150 MG tablet, Take 150 mg by mouth 2 (Two) Times a Day., Disp: , Rfl:   •  sertraline (ZOLOFT) 100 MG tablet, TK 1 AND 1/2 TS PO QD, Disp: , Rfl: 3  •  sulfamethoxazole-trimethoprim (BACTRIM DS) 800-160 MG per tablet, Take one tablet three times a week (Mon/Wed/Friday)., Disp: 12 tablet, Rfl: 5  •  temozolomide (TEMODAR) 140 MG chemo capsule, Take one capsule on days 1-5 of a 28-day cycle (along with 180 mg for total dose of 320 mg)., Disp: 5 capsule, Rfl: 5  •  temozolomide (TEMODAR) 180 MG chemo capsule, Take one, 180 mg tablet in combination with one, 140 mg tablet for a total dose of 320 mg on days 1-5 of a 28-day cycle, Disp: 5 capsule, Rfl: 5  •  valACYclovir (VALTREX) 500 MG tablet, Take 500 mg by mouth 2 (Two) Times a Day., Disp: , Rfl:   No current facility-administered medications for this visit.          Review of Systems   Constitutional: Positive for appetite change and fatigue. Negative for activity change, chills, diaphoresis, fever and unexpected weight change.   HENT: Negative for congestion, dental problem, drooling, ear discharge, ear pain, facial swelling, hearing loss, mouth sores, nosebleeds, postnasal drip, rhinorrhea, sinus pressure, sneezing, sore throat, tinnitus, trouble swallowing and voice change.    Eyes: Negative for photophobia, pain, discharge, redness, itching and visual disturbance.   Respiratory: Negative for apnea, cough, choking, chest tightness, shortness of breath, wheezing and stridor.    Cardiovascular: Negative for chest pain, palpitations and leg swelling.   Gastrointestinal: Negative for abdominal distention, abdominal pain, anal bleeding, blood in stool, constipation, diarrhea, nausea, rectal pain and vomiting.   Endocrine: Negative for cold intolerance, heat intolerance, polydipsia, polyphagia and polyuria.   Genitourinary: Negative for decreased urine volume, difficulty urinating, dysuria, enuresis, flank pain, frequency, genital sores,  "hematuria and urgency.   Musculoskeletal: Negative for arthralgias, back pain, gait problem, joint swelling, myalgias, neck pain and neck stiffness.   Skin: Negative for color change, pallor, rash and wound.   Allergic/Immunologic: Negative for environmental allergies, food allergies and immunocompromised state.   Neurological: Negative for dizziness, tremors, seizures, syncope, facial asymmetry, speech difficulty, weakness, light-headedness, numbness and headaches.   Hematological: Negative for adenopathy. Does not bruise/bleed easily.   Psychiatric/Behavioral: Negative for agitation, behavioral problems, confusion, decreased concentration, dysphoric mood, hallucinations, self-injury, sleep disturbance and suicidal ideas. The patient is not nervous/anxious and is not hyperactive.                Vitals:    02/28/17 1250   BP: 146/90   BP Location: Right arm   Patient Position: Sitting   Cuff Size: Adult   Temp: 98 °F (36.7 °C)   Weight: 256 lb (116 kg)   Height: 62\" (157.5 cm)               EXAMINATION: Alert and oriented.  No papilledema.  No weakness, sensory loss or reflex asymmetry.            MEDICAL DECISION MAKING: MRI of the brain is stable.  The intensity of the enhancement is somewhat lessened.  The flare seems to be about the same.           ASSESSMENT/DISPOSITION: I believe she has responded favorably to SRS and chemotherapy to this point.  We will continue to follow her and 8 weeks.  I have discussed the need for long-term TMZ              I APPRECIATE THE OPPORTUNITY OF THIS REFERRAL. PLEASE CALL IF ANY  QUESTIONS [phone]    Scribed for David Gregorio MD by Liza Hanley CMA. 2/28/2017  1:39 PM     I have read and concur with the information provided by the scribe.  David Gregorio MD    "

## 2017-03-13 ENCOUNTER — TELEPHONE (OUTPATIENT)
Dept: ONCOLOGY | Facility: HOSPITAL | Age: 42
End: 2017-03-13

## 2017-03-13 ENCOUNTER — LAB (OUTPATIENT)
Dept: ONCOLOGY | Facility: CLINIC | Age: 42
End: 2017-03-13

## 2017-03-13 VITALS
SYSTOLIC BLOOD PRESSURE: 136 MMHG | BODY MASS INDEX: 46.27 KG/M2 | DIASTOLIC BLOOD PRESSURE: 81 MMHG | RESPIRATION RATE: 18 BRPM | WEIGHT: 253 LBS | OXYGEN SATURATION: 99 % | TEMPERATURE: 97.5 F | HEART RATE: 95 BPM

## 2017-03-13 DIAGNOSIS — C71.9 GLIOMA (HCC): ICD-10-CM

## 2017-03-13 LAB
ALBUMIN SERPL-MCNC: 4.5 G/DL (ref 3.5–5)
ALBUMIN/GLOB SERPL: 1.7 G/DL (ref 1.5–2.5)
ALP SERPL-CCNC: 91 U/L (ref 35–104)
ALT SERPL W P-5'-P-CCNC: 65 U/L (ref 10–36)
ANION GAP SERPL CALCULATED.3IONS-SCNC: 7.2 MMOL/L (ref 3.6–11.2)
AST SERPL-CCNC: 45 U/L (ref 10–30)
BASOPHILS # BLD AUTO: 0.05 10*3/MM3 (ref 0–0.3)
BASOPHILS NFR BLD AUTO: 0.5 % (ref 0–2)
BILIRUB SERPL-MCNC: 0.5 MG/DL (ref 0.2–1.8)
BUN BLD-MCNC: 15 MG/DL (ref 7–21)
BUN/CREAT SERPL: 16 (ref 7–25)
CALCIUM SPEC-SCNC: 9.7 MG/DL (ref 7.7–10)
CHLORIDE SERPL-SCNC: 101 MMOL/L (ref 99–112)
CO2 SERPL-SCNC: 25.8 MMOL/L (ref 24.3–31.9)
CREAT BLD-MCNC: 0.94 MG/DL (ref 0.43–1.29)
DEPRECATED RDW RBC AUTO: 41.3 FL (ref 37–54)
EOSINOPHIL # BLD AUTO: 0.23 10*3/MM3 (ref 0–0.7)
EOSINOPHIL NFR BLD AUTO: 2.3 % (ref 0–5)
ERYTHROCYTE [DISTWIDTH] IN BLOOD BY AUTOMATED COUNT: 13.9 % (ref 11.5–14.5)
GFR SERPL CREATININE-BSD FRML MDRD: 65 ML/MIN/1.73
GLOBULIN UR ELPH-MCNC: 2.7 GM/DL
GLUCOSE BLD-MCNC: 365 MG/DL (ref 70–110)
HCT VFR BLD AUTO: 45 % (ref 37–47)
HGB BLD-MCNC: 15.7 G/DL (ref 12–16)
IMM GRANULOCYTES # BLD: 0.13 10*3/MM3 (ref 0–0.03)
IMM GRANULOCYTES NFR BLD: 1.3 % (ref 0–0.5)
LYMPHOCYTES # BLD AUTO: 3.09 10*3/MM3 (ref 1–3)
LYMPHOCYTES NFR BLD AUTO: 30.7 % (ref 21–51)
MCH RBC QN AUTO: 28.7 PG (ref 27–33)
MCHC RBC AUTO-ENTMCNC: 34.9 G/DL (ref 33–37)
MCV RBC AUTO: 82.3 FL (ref 80–94)
MONOCYTES # BLD AUTO: 0.72 10*3/MM3 (ref 0.1–0.9)
MONOCYTES NFR BLD AUTO: 7.2 % (ref 0–10)
NEUTROPHILS # BLD AUTO: 5.83 10*3/MM3 (ref 1.4–6.5)
NEUTROPHILS NFR BLD AUTO: 58 % (ref 30–70)
OSMOLALITY SERPL CALC.SUM OF ELEC: 283.9 MOSM/KG (ref 273–305)
PLATELET # BLD AUTO: 314 10*3/MM3 (ref 130–400)
PMV BLD AUTO: 10.5 FL (ref 6–10)
POTASSIUM BLD-SCNC: 4.4 MMOL/L (ref 3.5–5.3)
PROT SERPL-MCNC: 7.2 G/DL (ref 6–8)
RBC # BLD AUTO: 5.47 10*6/MM3 (ref 4.2–5.4)
SODIUM BLD-SCNC: 134 MMOL/L (ref 135–153)
WBC NRBC COR # BLD: 10.05 10*3/MM3 (ref 4.5–12.5)

## 2017-03-13 PROCEDURE — 36415 COLL VENOUS BLD VENIPUNCTURE: CPT | Performed by: INTERNAL MEDICINE

## 2017-03-13 PROCEDURE — 85025 COMPLETE CBC W/AUTO DIFF WBC: CPT | Performed by: INTERNAL MEDICINE

## 2017-03-13 PROCEDURE — 80053 COMPREHEN METABOLIC PANEL: CPT | Performed by: INTERNAL MEDICINE

## 2017-03-13 RX ORDER — FLUCONAZOLE 100 MG/1
100 TABLET ORAL DAILY
Qty: 7 TABLET | Refills: 0 | Status: SHIPPED | OUTPATIENT
Start: 2017-03-13 | End: 2017-03-20

## 2017-03-13 NOTE — TELEPHONE ENCOUNTER
----- Message from Jenniffer Ortiz MA sent at 3/13/2017  9:05 AM EDT -----  Patient need Diflucan called into [facility] in [facility]

## 2017-03-23 ENCOUNTER — OFFICE VISIT (OUTPATIENT)
Dept: ONCOLOGY | Facility: CLINIC | Age: 42
End: 2017-03-23

## 2017-03-23 ENCOUNTER — LAB (OUTPATIENT)
Dept: ONCOLOGY | Facility: CLINIC | Age: 42
End: 2017-03-23

## 2017-03-23 VITALS
DIASTOLIC BLOOD PRESSURE: 77 MMHG | WEIGHT: 252.5 LBS | HEART RATE: 86 BPM | OXYGEN SATURATION: 99 % | TEMPERATURE: 97.3 F | RESPIRATION RATE: 18 BRPM | BODY MASS INDEX: 46.18 KG/M2 | SYSTOLIC BLOOD PRESSURE: 144 MMHG

## 2017-03-23 DIAGNOSIS — C71.9 GLIOMA (HCC): ICD-10-CM

## 2017-03-23 LAB
ALBUMIN SERPL-MCNC: 4.3 G/DL (ref 3.5–5)
ALBUMIN/GLOB SERPL: 1.4 G/DL (ref 1.5–2.5)
ALP SERPL-CCNC: 86 U/L (ref 35–104)
ALT SERPL W P-5'-P-CCNC: 65 U/L (ref 10–36)
ANION GAP SERPL CALCULATED.3IONS-SCNC: 9.9 MMOL/L (ref 3.6–11.2)
AST SERPL-CCNC: 40 U/L (ref 10–30)
BASOPHILS # BLD AUTO: 0.02 10*3/MM3 (ref 0–0.3)
BASOPHILS NFR BLD AUTO: 0.4 % (ref 0–2)
BILIRUB SERPL-MCNC: 0.5 MG/DL (ref 0.2–1.8)
BUN BLD-MCNC: 9 MG/DL (ref 7–21)
BUN/CREAT SERPL: 10 (ref 7–25)
CALCIUM SPEC-SCNC: 9.5 MG/DL (ref 7.7–10)
CHLORIDE SERPL-SCNC: 103 MMOL/L (ref 99–112)
CO2 SERPL-SCNC: 22.1 MMOL/L (ref 24.3–31.9)
CREAT BLD-MCNC: 0.9 MG/DL (ref 0.43–1.29)
DEPRECATED RDW RBC AUTO: 42 FL (ref 37–54)
EOSINOPHIL # BLD AUTO: 0.15 10*3/MM3 (ref 0–0.7)
EOSINOPHIL NFR BLD AUTO: 2.7 % (ref 0–5)
ERYTHROCYTE [DISTWIDTH] IN BLOOD BY AUTOMATED COUNT: 14.2 % (ref 11.5–14.5)
GFR SERPL CREATININE-BSD FRML MDRD: 68 ML/MIN/1.73
GLOBULIN UR ELPH-MCNC: 3 GM/DL
GLUCOSE BLD-MCNC: 388 MG/DL (ref 70–110)
HCT VFR BLD AUTO: 43.6 % (ref 37–47)
HGB BLD-MCNC: 15.2 G/DL (ref 12–16)
IMM GRANULOCYTES # BLD: 0.03 10*3/MM3 (ref 0–0.03)
IMM GRANULOCYTES NFR BLD: 0.5 % (ref 0–0.5)
LYMPHOCYTES # BLD AUTO: 2.11 10*3/MM3 (ref 1–3)
LYMPHOCYTES NFR BLD AUTO: 37.5 % (ref 21–51)
MCH RBC QN AUTO: 28.7 PG (ref 27–33)
MCHC RBC AUTO-ENTMCNC: 34.9 G/DL (ref 33–37)
MCV RBC AUTO: 82.3 FL (ref 80–94)
MONOCYTES # BLD AUTO: 0.39 10*3/MM3 (ref 0.1–0.9)
MONOCYTES NFR BLD AUTO: 6.9 % (ref 0–10)
NEUTROPHILS # BLD AUTO: 2.92 10*3/MM3 (ref 1.4–6.5)
NEUTROPHILS NFR BLD AUTO: 52 % (ref 30–70)
OSMOLALITY SERPL CALC.SUM OF ELEC: 284.9 MOSM/KG (ref 273–305)
PLATELET # BLD AUTO: 249 10*3/MM3 (ref 130–400)
PMV BLD AUTO: 10.3 FL (ref 6–10)
POTASSIUM BLD-SCNC: 4.2 MMOL/L (ref 3.5–5.3)
PROT SERPL-MCNC: 7.3 G/DL (ref 6–8)
RBC # BLD AUTO: 5.3 10*6/MM3 (ref 4.2–5.4)
SODIUM BLD-SCNC: 135 MMOL/L (ref 135–153)
WBC NRBC COR # BLD: 5.62 10*3/MM3 (ref 4.5–12.5)

## 2017-03-23 PROCEDURE — 85025 COMPLETE CBC W/AUTO DIFF WBC: CPT | Performed by: INTERNAL MEDICINE

## 2017-03-23 PROCEDURE — 36415 COLL VENOUS BLD VENIPUNCTURE: CPT | Performed by: INTERNAL MEDICINE

## 2017-03-23 PROCEDURE — 99214 OFFICE O/P EST MOD 30 MIN: CPT | Performed by: INTERNAL MEDICINE

## 2017-03-23 PROCEDURE — 80053 COMPREHEN METABOLIC PANEL: CPT | Performed by: INTERNAL MEDICINE

## 2017-03-23 RX ORDER — CLONAZEPAM 1 MG/1
1 TABLET ORAL 2 TIMES DAILY PRN
COMMUNITY
End: 2018-01-01

## 2017-03-23 NOTE — PROGRESS NOTES
Name:  Marcelina Alvarez  :  1975  Date:  3/23/2017     REFERRING PHYSICIAN  David Gregorio MD    PRIMARY CARE PROVIDER  TAYLOR Heard    REASON FOR FOLLOWUP  1. Glioma      CHIEF COMPLAINT  None.    Dear Dr. Gregorio,    HISTORY OF PRESENT ILLNESS:   I saw Ms. Alvarez in follow up today in our medical oncology clinic. 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 She was initially diagnosed in , and you performed a successful resection. She subsequently underwent localized XRT (but no chemotherapy). She did very well until , when she developed a localized recurrence; and, again, you performed a successful resection (this time without adjuvant XRT). She was again doing well until late , when she started to develop some mild forgetfulness and headaches; and a repeat MRI showed evidence of recurrent disease again. This time, it was felt that repeat surgery was not in her best interest. She underwent Cyberknife treatment in early 2017 and tolerated it well. She was subsequently referred to our clinic to consider chemotherapy (Temodar). At the time of her initial appointment with us (on 2017), she was agreeable to this treatment (patient dose: 320 mg on days 1-5 of a 28-day cycle).    INTERIM HISTORY:  Ms. Alvarez presents to clinic today for follow up by herself. She tolerated the Cyberknife therapy in early 2017 with no issues. Her headaches all but resolved following this treatment. She took her first cycle (days 1-5) of Temodar from  - 2017 and her second from  - 2017, overall tolerating both of them well, with some mild and manageable episodes of nausea the week of treatment (and for a day or two after finishing Temodar) her only noticeable side effect. She overall feels well and again has no specific complaints in clinic today.    Past Medical History:    Diagnosis Date   • Brain tumor    • Hypertension    • Nerve damage     from shingles       Past Surgical History:   Procedure Laterality Date   • CHOLECYSTECTOMY     • CRANIOTOMY  [id number]   • ENDOMETRIAL ABLATION         Social History     Social History   • Marital status:      Spouse name: N/A   • Number of children: N/A   • Years of education: N/A     Occupational History   • Not on file.     Social History Main Topics   • Smoking status: Never Smoker   • Smokeless tobacco: Not on file   • Alcohol use No   • Drug use: No   • Sexual activity: Not on file     Other Topics Concern   • Not on file     Social History Narrative       Family History   Problem Relation Age of Onset   • Hypertension Father      pulmonary       Allergies   Allergen Reactions   • Diamox [Acetazolamide]        Current Outpatient Prescriptions   Medication Sig Dispense Refill   • ARIPiprazole (ABILIFY) 5 MG tablet TK 1 T PO QD  3   • clonazePAM (KlonoPIN) 1 MG tablet Take 0.5 mg by mouth Daily.     • gabapentin (NEURONTIN) 800 MG tablet      • ibuprofen (ADVIL,MOTRIN) 800 MG tablet TK 1 T PO TID PRN  5   • lisinopril (PRINIVIL,ZESTRIL) 20 MG tablet Take 20 mg by mouth daily.     • ondansetron (ZOFRAN) 4 MG tablet Take 1 tablet by mouth Every 8 (Eight) Hours As Needed for nausea or vomiting. 60 tablet 11   • prochlorperazine (COMPAZINE) 10 MG tablet Take 1 tablet by mouth Every 6 (Six) Hours As Needed for nausea or vomiting. 60 tablet 11   • raNITIdine (ZANTAC) 150 MG tablet Take 150 mg by mouth 2 (Two) Times a Day.     • sertraline (ZOLOFT) 100 MG tablet TK 1 AND 1/2 TS PO QD  3   • sulfamethoxazole-trimethoprim (BACTRIM DS) 800-160 MG per tablet Take one tablet three times a week (Mon/Wed/Friday). 12 tablet 5   • temozolomide (TEMODAR) 140 MG chemo capsule Take one capsule on days 1-5 of a 28-day cycle (along with 180 mg for total dose of 320 mg). 5 capsule 5   • temozolomide (TEMODAR) 180 MG chemo capsule Take one, 180 mg tablet  in combination with one, 140 mg tablet for a total dose of 320 mg on days 1-5 of a 28-day cycle 5 capsule 5   • valACYclovir (VALTREX) 500 MG tablet Take 500 mg by mouth 2 (Two) Times a Day.       No current facility-administered medications for this visit.        REVIEW OF SYSTEMS  CONSTITUTIONAL:  No fever, chills or night sweats.  EYES:  No blurry vision, diplopia or other vision changes.  ENT:  No hearing loss, nosebleeds or sore throat.  CARDIOVASCULAR:  No palpitations, arrhythmia, syncopal episodes or edema.  PULMONARY:  No hemoptysis, wheezing, chronic cough or shortness of breath.  GASTROINTESTINAL:  No constipation or diarrhea.  No abdominal pain. Mild, and manageable, nausea with Temodar (none for the remainder of the 28-day cycle).  GENITOURINARY:  No hematuria, kidney stones or frequent urination.  MUSCULOSKELETAL:  No joint or back pains.  INTEGUMENTARY: No rashes or pruritus.  ENDOCRINE:  No excessive thirst or hot flashes.  HEMATOLOGIC:  No history of free bleeding, spontaneous bleeding or clotting.  IMMUNOLOGIC:  No allergies or frequent infections.  NEUROLOGIC: No numbness, tingling, seizures or weakness. Resolved forgetfulness and (nearly resolved) headaches following Cyberknife therapy in January 2017, per the HPI above.  PSYCHIATRIC:  No anxiety or depression.    PHYSICAL EXAMINATION    /77  Pulse 86  Temp 97.3 °F (36.3 °C) (Oral)   Resp 18  Wt 252 lb 8 oz (115 kg)  SpO2 99%  BMI 46.18 kg/m2    GENERAL:  A well-developed, well-nourished, obese, white female in no acute distress.  HEENT:  Pupils equally round and reactive to light.  Extraocular muscles intact.  CARDIOVASCULAR:  Regular rate and rhythm.  No murmurs, gallops or rubs.  LUNGS:  Clear to auscultation bilaterally.  ABDOMEN:  Soft, nontender, nondistended with positive bowel sounds.  EXTREMITIES:  No clubbing, cyanosis or edema bilaterally.  SKIN:  No rashes or petechiae.  NEURO:  Cranial nerves grossly intact.  No focal  "deficits.  PSYCH:  Alert and oriented x3.    LABORATORY    Lab Results   Component Value Date    WBC 5.62 03/23/2017    HGB 15.2 03/23/2017    HCT 43.6 03/23/2017    MCV 82.3 03/23/2017     03/23/2017    NEUTROABS 2.92 03/23/2017       Lab Results   Component Value Date     03/23/2017    K 4.2 03/23/2017     03/23/2017    CO2 22.1 (L) 03/23/2017    BUN 9 03/23/2017    CREATININE 0.90 03/23/2017    GLUCOSE 388 (H) 03/23/2017    CALCIUM 9.5 03/23/2017    AST 40 (H) 03/23/2017    ALT 65 (H) 03/23/2017    ALKPHOS 86 03/23/2017    BILITOT 0.5 03/23/2017    PROTEINTOT 7.3 03/23/2017    ALBUMIN 4.30 03/23/2017       CBC (03/23/2017): WBCs: 5.6; HgB: 15.2; Hct: 43.6; platelets: 249  CBC (03/13/2017): WBCs: 10.0; HgB: 15.7; Hct: 45.0; platelets: 314  CBC (02/23/2017): WBCs: 6.4; HgB: 15.4; Hct: 43.9; platelets: 248    IMAGING  MRI brain with and without contrast (06/13/2016):  Impression: There are bilateral frontal changes related to prior surgery, right greater than left. There is some associated gliosis and minimal contrast enhancement in the superior leftward aspect of the surgical \"bed\". Most importantly, there has been no change since 02/22/2016.    MRI cyberknife brain with contrast (12/28/2016):  Impression: Areas of increased blood flow in the right frontal lobe in areas of abnormal contrast enhancement and therefore the abnormality seen on prior MRI is highly suspicious for tumor.    MRI brain with and without contrast (02/28/2017):  Impression: Slight interval increase in the size of the area of abnormal contrast enhancement diffusely throughout the right frontal region. The amount of surrounding edema is also increased in the interval. Findings may be related to progression of disease however postradiation changes cannot be excluded. The edema extends into the right basal ganglia and parietal lobe. No new area of abnormal contrast enhancement identified. [Per a neurosurgery read and evaluation " that day, the intensity of the enhancement is somewhat lessened, and the flare appears to be about the same.]    PATHOLOGY    IMPRESSION AND PLAN  Ms. Alvarez is a 42 y.o., white female with:  1. Glioma: Initially diagnosed in 2007, with recurrences in 2014 and, most recently, late 2016. Status post resection x 2 (in 2007 and 2014) and localized radiation x 2 (adjuvantly in 2007; and, most recently, a Cyberknife boost delivered in January 2017). I had a long discussion with the patient in clinic at the time of her initial appointment in our clinic (on 01/20/2017) regarding her diagnosis. Since a third resection was felt to not be in her best interest and she completed localized XRT, we agreed with neurosurgery's recommendation to start chemotherapy. She began Temodar (patient dose: 320 mg daily on days 1-5 of a 28 day cycle) by late January 2017 and has completed two, qmonthly cycles to date, (taking pills from 01/30 to 02/03/2017 and 02/27 to 03/03/2017). She is still tolerating this regimen very well, with some mild, and manageable nausea her only noticeable side effect. Her CBCs have remained unremarkable (including today's), with no developing cytopenias. She continues to deny routine headaches or any other neurologic symptoms. Meanwhile, the most recent repeat MRI of the brain (performed on 02/28/2017 and summarized above) showed decreased intensity and stable flare, signs that neurosurgery feels indicate a favorable response from her ongoing treatment. We will proceed with this current treatment plan (days 1-5 of cycle #3 scheduled for 03/27 - 03/31/2017), continue to monitor her CBCs routinely (still [id number] for now) and see her back in clinic in one month (just prior to the start of the fourth cycle) for another symptom/toxicity check. She will also follow up with neurosurgery in two months with another repeat MRI, as previously planned.  2. Nausea: Mild and manageable. The patient was previously advised to  take Zofran scheduled once or twice a day (whether she feels nauseated or not) during the five days she takes Temodar (and perhaps for a day or two afterwards), and this has improved her symptoms somewhat. Continue prn compazine and dramamine for breakthrough. Continue to monitor.  The patient was in agreement with these plans.    It is a pleasure to participate in Ms. Devlin's care. Please do not hesitate to call with any questions or concerns that you may have.    A total of 30 minutes were spent coordinating this patient’s care in clinic today; 25 minutes of which were face-to-face with the patient, reviewing her interim medical history, discussing the results of recent lab work and counseling on the current treatment and followup plan.  All questions were answered to her satisfaction.    FOLLOW UP  Proceed with Temodar 150 mg/m2/day (patient dose: 320 mg days 1-5 of a q28 day cycle), prophylactic Bactrim and prn Zofran (day 1 of cycle #3 scheduled for 03/27/2017). Continue to check repeat CBCs and CMPs [id number] for now. With neurosurgery in two months with another repeat MRI, as previously planned. Return to our clinic in one month, just prior to the start of the 4th cycle, with a CBC and CMP.        This document was electronically signed by JEFFY Pinto MD March 23, 2017 10:13 AM      CC: MD Lisseth Becerra MD Beverly Paige Neace, PA

## 2017-04-13 DIAGNOSIS — C71.9 GLIOMA (HCC): ICD-10-CM

## 2017-04-21 ENCOUNTER — TELEPHONE (OUTPATIENT)
Dept: ONCOLOGY | Facility: HOSPITAL | Age: 42
End: 2017-04-21

## 2017-04-21 RX ORDER — FLUCONAZOLE 150 MG/1
150 TABLET ORAL DAILY
Qty: 3 TABLET | Refills: 0 | Status: SHIPPED | OUTPATIENT
Start: 2017-04-21 | End: 2017-06-20

## 2017-04-21 NOTE — TELEPHONE ENCOUNTER
----- Message from Graciela Mathews sent at 4/21/2017  4:14 PM EDT -----  Regarding: PHONE CALL  Needs a prescription for diflucan. Said the antibiotics has gave her a yeast infection.

## 2017-04-25 ENCOUNTER — HOSPITAL ENCOUNTER (OUTPATIENT)
Dept: MRI IMAGING | Facility: HOSPITAL | Age: 42
Discharge: HOME | End: 2017-04-25
Attending: NEUROLOGICAL SURGERY | Admitting: NEUROLOGICAL SURGERY

## 2017-04-25 ENCOUNTER — OFFICE VISIT (OUTPATIENT)
Dept: NEUROSURGERY | Facility: CLINIC | Age: 42
End: 2017-04-25

## 2017-04-25 VITALS
DIASTOLIC BLOOD PRESSURE: 86 MMHG | WEIGHT: 252 LBS | BODY MASS INDEX: 46.38 KG/M2 | HEIGHT: 62 IN | TEMPERATURE: 97.2 F | SYSTOLIC BLOOD PRESSURE: 126 MMHG

## 2017-04-25 DIAGNOSIS — C71.9 GLIOMA (HCC): Primary | ICD-10-CM

## 2017-04-25 PROCEDURE — A9577 INJ MULTIHANCE: HCPCS | Performed by: NEUROLOGICAL SURGERY

## 2017-04-25 PROCEDURE — 0 GADOBENATE DIMEGLUMINE 529 MG/ML SOLUTION: Performed by: NEUROLOGICAL SURGERY

## 2017-04-25 PROCEDURE — 99213 OFFICE O/P EST LOW 20 MIN: CPT | Performed by: NEUROLOGICAL SURGERY

## 2017-04-25 PROCEDURE — 70553 MRI BRAIN STEM W/O & W/DYE: CPT

## 2017-04-25 RX ADMIN — GADOBENATE DIMEGLUMINE 20 ML: 529 INJECTION, SOLUTION INTRAVENOUS at 12:05

## 2017-04-25 NOTE — PROGRESS NOTES
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  Her most recent MRI in December suggested presence of recurrence and was treated with the CyberKnife.  She is on her fourth dose of TMZ.  She reports for an 8 week follow-up.  She has no symptoms otherwise. ]           Past Medical History:   Diagnosis Date   • Brain tumor    • Hypertension    • Nerve damage     from shingles              Past Surgical History:   Procedure Laterality Date   • CHOLECYSTECTOMY     • CRANIOTOMY  [id number]   • ENDOMETRIAL ABLATION                Family History   Problem Relation Age of Onset   • Hypertension Father      pulmonary              Social History     Social History   • Marital status:      Spouse name: N/A   • Number of children: N/A   • Years of education: N/A     Occupational History   • Not on file.     Social History Main Topics   • Smoking status: Never Smoker   • Smokeless tobacco: Not on file   • Alcohol use No   • Drug use: No   • Sexual activity: Not on file     Other Topics Concern   • Not on file     Social History Narrative              Allergies   Allergen Reactions   • Diamox [Acetazolamide]               Current Outpatient Prescriptions:   •  ARIPiprazole (ABILIFY) 5 MG tablet, TK 1 T PO QD, Disp: , Rfl: 3  •  clonazePAM (KlonoPIN) 1 MG tablet, Take 0.5 mg by mouth Daily., Disp: , Rfl:   •  fluconazole (DIFLUCAN) 150 MG tablet, Take 1 tablet by mouth Daily., Disp: 3 tablet, Rfl: 0  •  gabapentin (NEURONTIN) 800 MG tablet, , Disp: , Rfl:   •  ibuprofen (ADVIL,MOTRIN) 800 MG tablet, TK 1 T PO TID PRN, Disp: , Rfl: 5  •  lisinopril (PRINIVIL,ZESTRIL) 20 MG tablet, Take 20 mg by mouth daily., Disp: , Rfl:   •  ondansetron (ZOFRAN) 4 MG tablet, Take 1 tablet by mouth  Every 8 (Eight) Hours As Needed for nausea or vomiting., Disp: 60 tablet, Rfl: 11  •  prochlorperazine (COMPAZINE) 10 MG tablet, Take 1 tablet by mouth Every 6 (Six) Hours As Needed for nausea or vomiting., Disp: 60 tablet, Rfl: 11  •  raNITIdine (ZANTAC) 150 MG tablet, Take 150 mg by mouth 2 (Two) Times a Day., Disp: , Rfl:   •  sertraline (ZOLOFT) 100 MG tablet, TK 1 AND 1/2 TS PO QD, Disp: , Rfl: 3  •  sulfamethoxazole-trimethoprim (BACTRIM DS) 800-160 MG per tablet, Take one tablet three times a week (Mon/Wed/Friday)., Disp: 12 tablet, Rfl: 5  •  temozolomide (TEMODAR) 140 MG chemo capsule, Take one capsule on days 1-5 of a 28-day cycle (along with 180 mg for total dose of 320 mg)., Disp: 5 capsule, Rfl: 5  •  temozolomide (TEMODAR) 180 MG chemo capsule, Take one, 180 mg tablet in combination with one, 140 mg tablet for a total dose of 320 mg on days 1-5 of a 28-day cycle, Disp: 5 capsule, Rfl: 5  •  valACYclovir (VALTREX) 500 MG tablet, Take 500 mg by mouth 2 (Two) Times a Day., Disp: , Rfl:   No current facility-administered medications for this visit.          Review of Systems   Constitutional: Negative for activity change, appetite change, chills, diaphoresis, fatigue, fever and unexpected weight change.   HENT: Negative for congestion, dental problem, drooling, ear discharge, ear pain, facial swelling, hearing loss, mouth sores, nosebleeds, postnasal drip, rhinorrhea, sinus pressure, sneezing, sore throat, tinnitus, trouble swallowing and voice change.    Eyes: Negative for photophobia, pain, discharge, redness, itching and visual disturbance.   Respiratory: Negative for apnea, cough, choking, chest tightness, shortness of breath, wheezing and stridor.    Cardiovascular: Negative for chest pain, palpitations and leg swelling.   Gastrointestinal: Negative for abdominal distention, abdominal pain, anal bleeding, blood in stool, constipation, diarrhea, nausea, rectal pain and vomiting.   Endocrine: Negative  "for cold intolerance, heat intolerance, polydipsia, polyphagia and polyuria.   Genitourinary: Negative for decreased urine volume, difficulty urinating, dysuria, enuresis, flank pain, frequency, genital sores, hematuria and urgency.   Musculoskeletal: Negative for arthralgias, back pain, gait problem, joint swelling, myalgias, neck pain and neck stiffness.   Skin: Negative for color change, pallor, rash and wound.   Allergic/Immunologic: Negative for environmental allergies, food allergies and immunocompromised state.   Neurological: Positive for headaches. Negative for dizziness, tremors, seizures, syncope, facial asymmetry, speech difficulty, weakness, light-headedness and numbness.   Hematological: Negative for adenopathy. Does not bruise/bleed easily.   Psychiatric/Behavioral: Negative for agitation, behavioral problems, confusion, decreased concentration, dysphoric mood, hallucinations, self-injury, sleep disturbance and suicidal ideas. The patient is not nervous/anxious and is not hyperactive.    All other systems reviewed and are negative.              Vitals:    04/25/17 1259   BP: 126/86   Temp: 97.2 °F (36.2 °C)   Weight: 252 lb (114 kg)   Height: 62\" (157.5 cm)               EXAMINATION: Alert without deficit.            MEDICAL DECISION MAKING: The MRI shows progression.  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  If this is indeed progression I would be inclined to proceed with a right frontal lobectomy.  The other alternative is to switch her to Avastin rather than TMZ.  She has no headache.  She will see me subsequent to her studies.           ASSESSMENT/DISPOSITION: [ ]              I APPRECIATE THE OPPORTUNITY OF THIS REFERRAL. PLEASE CALL IF ANY  QUESTIONS [phone]    Scribed for David Gregorio MD by Liza Hanley CMA. 4/25/2017  1:19 PM     I have read and concur with the information provided by the " raul.  David Gregorio MD

## 2017-04-27 ENCOUNTER — OFFICE VISIT (OUTPATIENT)
Dept: NEUROSURGERY | Facility: CLINIC | Age: 42
End: 2017-04-27

## 2017-04-27 ENCOUNTER — HOSPITAL ENCOUNTER (OUTPATIENT)
Dept: MRI IMAGING | Facility: HOSPITAL | Age: 42
Discharge: HOME | End: 2017-04-27
Attending: NEUROLOGICAL SURGERY | Admitting: NEUROLOGICAL SURGERY

## 2017-04-27 VITALS
HEIGHT: 62 IN | BODY MASS INDEX: 44.9 KG/M2 | TEMPERATURE: 97.9 F | SYSTOLIC BLOOD PRESSURE: 122 MMHG | WEIGHT: 244 LBS | DIASTOLIC BLOOD PRESSURE: 90 MMHG

## 2017-04-27 DIAGNOSIS — C71.9 GLIOMA (HCC): Primary | ICD-10-CM

## 2017-04-27 PROCEDURE — A9577 INJ MULTIHANCE: HCPCS | Performed by: NEUROLOGICAL SURGERY

## 2017-04-27 PROCEDURE — 99213 OFFICE O/P EST LOW 20 MIN: CPT | Performed by: NEUROLOGICAL SURGERY

## 2017-04-27 PROCEDURE — 70553 MRI BRAIN STEM W/O & W/DYE: CPT

## 2017-04-27 PROCEDURE — 0 GADOBENATE DIMEGLUMINE 529 MG/ML SOLUTION: Performed by: NEUROLOGICAL SURGERY

## 2017-04-27 RX ADMIN — GADOBENATE DIMEGLUMINE 20 ML: 529 INJECTION, SOLUTION INTRAVENOUS at 09:00

## 2017-04-27 NOTE — PROGRESS NOTES
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  Reviewed this with a neuroradiologists. ]           Past Medical History:   Diagnosis Date   • Brain tumor    • Hypertension    • Nerve damage     from shingles              Past Surgical History:   Procedure Laterality Date   • CHOLECYSTECTOMY     • CRANIOTOMY  [id number]   • ENDOMETRIAL ABLATION                Family History   Problem Relation Age of Onset   • Hypertension Father      pulmonary              Social History     Social History   • Marital status:      Spouse name: N/A   • Number of children: N/A   • Years of education: N/A     Occupational History   • Not on file.     Social History Main Topics   • Smoking status: Never Smoker   • Smokeless tobacco: Not on file   • Alcohol use No   • Drug use: No   • Sexual activity: Not on file     Other Topics Concern   • Not on file     Social History Narrative              Allergies   Allergen Reactions   • Diamox [Acetazolamide]               Current Outpatient Prescriptions:   •  ARIPiprazole (ABILIFY) 5 MG tablet, TK 1 T PO QD, Disp: , Rfl: 3  •  clonazePAM (KlonoPIN) 1 MG tablet, Take 0.5 mg by mouth Daily., Disp: , Rfl:   •  fluconazole (DIFLUCAN) 150 MG tablet, Take 1 tablet by mouth Daily., Disp: 3 tablet, Rfl: 0  •  gabapentin (NEURONTIN) 800 MG tablet, , Disp: , Rfl:   •  ibuprofen (ADVIL,MOTRIN) 800 MG tablet, TK 1 T PO TID PRN, Disp: , Rfl: 5  •  lisinopril (PRINIVIL,ZESTRIL) 20 MG tablet, Take 20 mg by mouth daily., Disp: , Rfl:   •  ondansetron (ZOFRAN) 4 MG tablet, Take 1 tablet by mouth Every 8 (Eight) Hours As Needed for nausea or vomiting., Disp: 60 tablet, Rfl: 11  •  prochlorperazine  (COMPAZINE) 10 MG tablet, Take 1 tablet by mouth Every 6 (Six) Hours As Needed for nausea or vomiting., Disp: 60 tablet, Rfl: 11  •  raNITIdine (ZANTAC) 150 MG tablet, Take 150 mg by mouth 2 (Two) Times a Day., Disp: , Rfl:   •  sertraline (ZOLOFT) 100 MG tablet, TK 1 AND 1/2 TS PO QD, Disp: , Rfl: 3  •  sulfamethoxazole-trimethoprim (BACTRIM DS) 800-160 MG per tablet, Take one tablet three times a week (Mon/Wed/Friday)., Disp: 12 tablet, Rfl: 5  •  temozolomide (TEMODAR) 140 MG chemo capsule, Take one capsule on days 1-5 of a 28-day cycle (along with 180 mg for total dose of 320 mg)., Disp: 5 capsule, Rfl: 5  •  temozolomide (TEMODAR) 180 MG chemo capsule, Take one, 180 mg tablet in combination with one, 140 mg tablet for a total dose of 320 mg on days 1-5 of a 28-day cycle, Disp: 5 capsule, Rfl: 5  •  valACYclovir (VALTREX) 500 MG tablet, Take 500 mg by mouth 2 (Two) Times a Day., Disp: , Rfl:   No current facility-administered medications for this visit.          Review of Systems   Constitutional: Negative for activity change, appetite change, chills, diaphoresis, fatigue, fever and unexpected weight change.   HENT: Negative for congestion, dental problem, drooling, ear discharge, ear pain, facial swelling, hearing loss, mouth sores, nosebleeds, postnasal drip, rhinorrhea, sinus pressure, sneezing, sore throat, tinnitus, trouble swallowing and voice change.    Eyes: Negative for photophobia, pain, discharge, redness, itching and visual disturbance.   Respiratory: Negative for apnea, cough, choking, chest tightness, shortness of breath, wheezing and stridor.    Cardiovascular: Negative for chest pain, palpitations and leg swelling.   Gastrointestinal: Negative for abdominal distention, abdominal pain, anal bleeding, blood in stool, constipation, diarrhea, nausea, rectal pain and vomiting.   Endocrine: Negative for cold intolerance, heat intolerance, polydipsia, polyphagia and polyuria.   Genitourinary: Negative  "for decreased urine volume, difficulty urinating, dysuria, enuresis, flank pain, frequency, genital sores, hematuria and urgency.   Musculoskeletal: Negative for arthralgias, back pain, gait problem, joint swelling, myalgias, neck pain and neck stiffness.   Skin: Negative for color change, pallor, rash and wound.   Allergic/Immunologic: Negative for environmental allergies, food allergies and immunocompromised state.   Neurological: Negative for dizziness, tremors, seizures, syncope, facial asymmetry, speech difficulty, weakness, light-headedness, numbness and headaches.   Hematological: Negative for adenopathy. Does not bruise/bleed easily.   Psychiatric/Behavioral: Negative for agitation, behavioral problems, confusion, decreased concentration, dysphoric mood, hallucinations, self-injury, sleep disturbance and suicidal ideas. The patient is not nervous/anxious and is not hyperactive.    All other systems reviewed and are negative.              Vitals:    04/27/17 0951   BP: 122/90   Temp: 97.9 °F (36.6 °C)   Weight: 244 lb (111 kg)   Height: 62\" (157.5 cm)               EXAMINATION: [unchanged ]            MEDICAL DECISION MAKING: [unchanged ]           ASSESSMENT/DISPOSITION: [ she will return to see me in 1 month.  At that time we will get a CT perfusion scan of the head as well as MRI with and without contrast.  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  He should continue with TMZ]              I APPRECIATE THE OPPORTUNITY OF THIS REFERRAL. PLEASE CALL IF ANY       QUESTIONS [phone]    Scribed for David Gregorio MD by Liza Hanley CMA. 4/27/2017  10:30 AM    I have read and concur with the information provided by the scribe.  David Gregorio MD    "

## 2017-05-03 ENCOUNTER — OFFICE VISIT (OUTPATIENT)
Dept: ONCOLOGY | Facility: CLINIC | Age: 42
End: 2017-05-03

## 2017-05-03 ENCOUNTER — LAB (OUTPATIENT)
Dept: ONCOLOGY | Facility: CLINIC | Age: 42
End: 2017-05-03

## 2017-05-03 VITALS
OXYGEN SATURATION: 99 % | DIASTOLIC BLOOD PRESSURE: 84 MMHG | HEART RATE: 84 BPM | RESPIRATION RATE: 18 BRPM | TEMPERATURE: 98.3 F | WEIGHT: 243 LBS | BODY MASS INDEX: 44.45 KG/M2 | SYSTOLIC BLOOD PRESSURE: 127 MMHG

## 2017-05-03 DIAGNOSIS — C71.9 GLIOMA (HCC): ICD-10-CM

## 2017-05-03 DIAGNOSIS — C71.9 GLIOMA (HCC): Primary | ICD-10-CM

## 2017-05-03 LAB
ALBUMIN SERPL-MCNC: 4.4 G/DL (ref 3.5–5)
ALBUMIN/GLOB SERPL: 1.3 G/DL (ref 1.5–2.5)
ALP SERPL-CCNC: 93 U/L (ref 35–104)
ALT SERPL W P-5'-P-CCNC: 76 U/L (ref 10–36)
ANION GAP SERPL CALCULATED.3IONS-SCNC: 8.5 MMOL/L (ref 3.6–11.2)
AST SERPL-CCNC: 52 U/L (ref 10–30)
BASOPHILS # BLD AUTO: 0.03 10*3/MM3 (ref 0–0.3)
BASOPHILS NFR BLD AUTO: 0.4 % (ref 0–2)
BILIRUB SERPL-MCNC: 0.6 MG/DL (ref 0.2–1.8)
BUN BLD-MCNC: 13 MG/DL (ref 7–21)
BUN/CREAT SERPL: 14.6 (ref 7–25)
CALCIUM SPEC-SCNC: 9.7 MG/DL (ref 7.7–10)
CHLORIDE SERPL-SCNC: 102 MMOL/L (ref 99–112)
CO2 SERPL-SCNC: 25.5 MMOL/L (ref 24.3–31.9)
CREAT BLD-MCNC: 0.89 MG/DL (ref 0.43–1.29)
DEPRECATED RDW RBC AUTO: 40.3 FL (ref 37–54)
EOSINOPHIL # BLD AUTO: 0.27 10*3/MM3 (ref 0–0.7)
EOSINOPHIL NFR BLD AUTO: 3.4 % (ref 0–5)
ERYTHROCYTE [DISTWIDTH] IN BLOOD BY AUTOMATED COUNT: 13.9 % (ref 11.5–14.5)
GFR SERPL CREATININE-BSD FRML MDRD: 69 ML/MIN/1.73
GLOBULIN UR ELPH-MCNC: 3.3 GM/DL
GLUCOSE BLD-MCNC: 378 MG/DL (ref 70–110)
HCT VFR BLD AUTO: 45.7 % (ref 37–47)
HGB BLD-MCNC: 16.3 G/DL (ref 12–16)
IMM GRANULOCYTES # BLD: 0.03 10*3/MM3 (ref 0–0.03)
IMM GRANULOCYTES NFR BLD: 0.4 % (ref 0–0.5)
LYMPHOCYTES # BLD AUTO: 2.2 10*3/MM3 (ref 1–3)
LYMPHOCYTES NFR BLD AUTO: 28 % (ref 21–51)
MCH RBC QN AUTO: 29.1 PG (ref 27–33)
MCHC RBC AUTO-ENTMCNC: 35.7 G/DL (ref 33–37)
MCV RBC AUTO: 81.5 FL (ref 80–94)
MONOCYTES # BLD AUTO: 0.6 10*3/MM3 (ref 0.1–0.9)
MONOCYTES NFR BLD AUTO: 7.6 % (ref 0–10)
NEUTROPHILS # BLD AUTO: 4.72 10*3/MM3 (ref 1.4–6.5)
NEUTROPHILS NFR BLD AUTO: 60.2 % (ref 30–70)
OSMOLALITY SERPL CALC.SUM OF ELEC: 287.6 MOSM/KG (ref 273–305)
PLATELET # BLD AUTO: 341 10*3/MM3 (ref 130–400)
PMV BLD AUTO: 10.1 FL (ref 6–10)
POTASSIUM BLD-SCNC: 4.6 MMOL/L (ref 3.5–5.3)
PROT SERPL-MCNC: 7.7 G/DL (ref 6–8)
RBC # BLD AUTO: 5.61 10*6/MM3 (ref 4.2–5.4)
SODIUM BLD-SCNC: 136 MMOL/L (ref 135–153)
WBC NRBC COR # BLD: 7.85 10*3/MM3 (ref 4.5–12.5)

## 2017-05-03 PROCEDURE — 99214 OFFICE O/P EST MOD 30 MIN: CPT | Performed by: INTERNAL MEDICINE

## 2017-05-03 PROCEDURE — 36415 COLL VENOUS BLD VENIPUNCTURE: CPT

## 2017-05-03 PROCEDURE — 85025 COMPLETE CBC W/AUTO DIFF WBC: CPT | Performed by: INTERNAL MEDICINE

## 2017-05-03 PROCEDURE — 80053 COMPREHEN METABOLIC PANEL: CPT | Performed by: INTERNAL MEDICINE

## 2017-05-03 PROCEDURE — 36415 COLL VENOUS BLD VENIPUNCTURE: CPT | Performed by: INTERNAL MEDICINE

## 2017-05-18 ENCOUNTER — LAB (OUTPATIENT)
Dept: ONCOLOGY | Facility: CLINIC | Age: 42
End: 2017-05-18

## 2017-05-18 VITALS
RESPIRATION RATE: 18 BRPM | TEMPERATURE: 98.4 F | WEIGHT: 242.8 LBS | OXYGEN SATURATION: 98 % | BODY MASS INDEX: 44.41 KG/M2 | DIASTOLIC BLOOD PRESSURE: 82 MMHG | HEART RATE: 70 BPM | SYSTOLIC BLOOD PRESSURE: 121 MMHG

## 2017-05-18 DIAGNOSIS — C71.9 GLIOMA (HCC): ICD-10-CM

## 2017-05-18 LAB
ALBUMIN SERPL-MCNC: 4.1 G/DL (ref 3.5–5)
ALBUMIN/GLOB SERPL: 1.3 G/DL (ref 1.5–2.5)
ALP SERPL-CCNC: 76 U/L (ref 35–104)
ALT SERPL W P-5'-P-CCNC: 55 U/L (ref 10–36)
ANION GAP SERPL CALCULATED.3IONS-SCNC: 7.1 MMOL/L (ref 3.6–11.2)
AST SERPL-CCNC: 47 U/L (ref 10–30)
BASOPHILS # BLD AUTO: 0.02 10*3/MM3 (ref 0–0.3)
BASOPHILS NFR BLD AUTO: 0.4 % (ref 0–2)
BILIRUB SERPL-MCNC: 0.7 MG/DL (ref 0.2–1.8)
BUN BLD-MCNC: 10 MG/DL (ref 7–21)
BUN/CREAT SERPL: 11.5 (ref 7–25)
CALCIUM SPEC-SCNC: 9.5 MG/DL (ref 7.7–10)
CHLORIDE SERPL-SCNC: 106 MMOL/L (ref 99–112)
CO2 SERPL-SCNC: 26.9 MMOL/L (ref 24.3–31.9)
CREAT BLD-MCNC: 0.87 MG/DL (ref 0.43–1.29)
DEPRECATED RDW RBC AUTO: 41.5 FL (ref 37–54)
EOSINOPHIL # BLD AUTO: 0.21 10*3/MM3 (ref 0–0.7)
EOSINOPHIL NFR BLD AUTO: 4.1 % (ref 0–5)
ERYTHROCYTE [DISTWIDTH] IN BLOOD BY AUTOMATED COUNT: 13.7 % (ref 11.5–14.5)
GFR SERPL CREATININE-BSD FRML MDRD: 71 ML/MIN/1.73
GLOBULIN UR ELPH-MCNC: 3.1 GM/DL
GLUCOSE BLD-MCNC: 256 MG/DL (ref 70–110)
HCT VFR BLD AUTO: 45.6 % (ref 37–47)
HGB BLD-MCNC: 15.6 G/DL (ref 12–16)
IMM GRANULOCYTES # BLD: 0.03 10*3/MM3 (ref 0–0.03)
IMM GRANULOCYTES NFR BLD: 0.6 % (ref 0–0.5)
LYMPHOCYTES # BLD AUTO: 1.15 10*3/MM3 (ref 1–3)
LYMPHOCYTES NFR BLD AUTO: 22.4 % (ref 21–51)
MCH RBC QN AUTO: 28.6 PG (ref 27–33)
MCHC RBC AUTO-ENTMCNC: 34.2 G/DL (ref 33–37)
MCV RBC AUTO: 83.7 FL (ref 80–94)
MONOCYTES # BLD AUTO: 0.5 10*3/MM3 (ref 0.1–0.9)
MONOCYTES NFR BLD AUTO: 9.7 % (ref 0–10)
NEUTROPHILS # BLD AUTO: 3.22 10*3/MM3 (ref 1.4–6.5)
NEUTROPHILS NFR BLD AUTO: 62.8 % (ref 30–70)
OSMOLALITY SERPL CALC.SUM OF ELEC: 287.2 MOSM/KG (ref 273–305)
PLATELET # BLD AUTO: 173 10*3/MM3 (ref 130–400)
PMV BLD AUTO: 10 FL (ref 6–10)
POTASSIUM BLD-SCNC: 4.3 MMOL/L (ref 3.5–5.3)
PROT SERPL-MCNC: 7.2 G/DL (ref 6–8)
RBC # BLD AUTO: 5.45 10*6/MM3 (ref 4.2–5.4)
SODIUM BLD-SCNC: 140 MMOL/L (ref 135–153)
WBC NRBC COR # BLD: 5.13 10*3/MM3 (ref 4.5–12.5)

## 2017-05-18 PROCEDURE — 36415 COLL VENOUS BLD VENIPUNCTURE: CPT

## 2017-05-18 PROCEDURE — 85025 COMPLETE CBC W/AUTO DIFF WBC: CPT | Performed by: INTERNAL MEDICINE

## 2017-05-18 PROCEDURE — 36415 COLL VENOUS BLD VENIPUNCTURE: CPT | Performed by: INTERNAL MEDICINE

## 2017-05-18 PROCEDURE — 80053 COMPREHEN METABOLIC PANEL: CPT | Performed by: INTERNAL MEDICINE

## 2017-05-30 ENCOUNTER — HOSPITAL ENCOUNTER (OUTPATIENT)
Dept: MRI IMAGING | Facility: HOSPITAL | Age: 42
Discharge: HOME | End: 2017-05-30
Attending: NEUROLOGICAL SURGERY | Admitting: NEUROLOGICAL SURGERY

## 2017-05-30 ENCOUNTER — OFFICE VISIT (OUTPATIENT)
Dept: NEUROSURGERY | Facility: CLINIC | Age: 42
End: 2017-05-30

## 2017-05-30 ENCOUNTER — HOSPITAL ENCOUNTER (OUTPATIENT)
Dept: CT IMAGING | Facility: HOSPITAL | Age: 42
Discharge: HOME | End: 2017-05-30
Attending: NEUROLOGICAL SURGERY

## 2017-05-30 VITALS
SYSTOLIC BLOOD PRESSURE: 104 MMHG | DIASTOLIC BLOOD PRESSURE: 60 MMHG | TEMPERATURE: 97.9 F | HEIGHT: 62 IN | WEIGHT: 243 LBS | BODY MASS INDEX: 44.72 KG/M2

## 2017-05-30 DIAGNOSIS — C71.9 GLIOMA (HCC): ICD-10-CM

## 2017-05-30 DIAGNOSIS — C71.9 GLIOMA (HCC): Primary | ICD-10-CM

## 2017-05-30 PROCEDURE — 99213 OFFICE O/P EST LOW 20 MIN: CPT | Performed by: NEUROLOGICAL SURGERY

## 2017-05-30 PROCEDURE — A9577 INJ MULTIHANCE: HCPCS | Performed by: NEUROLOGICAL SURGERY

## 2017-05-30 PROCEDURE — 0 GADOBENATE DIMEGLUMINE 529 MG/ML SOLUTION: Performed by: NEUROLOGICAL SURGERY

## 2017-05-30 PROCEDURE — 82565 ASSAY OF CREATININE: CPT

## 2017-05-30 PROCEDURE — 0 IOPAMIDOL PER 1 ML: Performed by: NEUROLOGICAL SURGERY

## 2017-05-30 PROCEDURE — 0042T HC CT CEREBRAL PERFUSION W/WO CONTRAST: CPT

## 2017-05-30 PROCEDURE — 70553 MRI BRAIN STEM W/O & W/DYE: CPT

## 2017-05-30 RX ORDER — BLOOD SUGAR DIAGNOSTIC
STRIP MISCELLANEOUS
Refills: 3 | COMMUNITY
Start: 2017-05-23 | End: 2017-06-20

## 2017-05-30 RX ORDER — BLOOD-GLUCOSE METER
EACH MISCELLANEOUS
Refills: 0 | COMMUNITY
Start: 2017-05-04 | End: 2017-06-20

## 2017-05-30 RX ORDER — LANCETS
EACH MISCELLANEOUS
Refills: 3 | COMMUNITY
Start: 2017-05-06 | End: 2017-06-20

## 2017-05-30 RX ORDER — SITAGLIPTIN 100 MG/1
TABLET, FILM COATED ORAL
Refills: 3 | COMMUNITY
Start: 2017-05-13 | End: 2017-06-20

## 2017-05-30 RX ORDER — DEXAMETHASONE 2 MG/1
2 TABLET ORAL
Qty: 30 TABLET | Refills: 0 | Status: SHIPPED | OUTPATIENT
Start: 2017-05-30 | End: 2017-06-24 | Stop reason: HOSPADM

## 2017-05-30 RX ORDER — ZOLPIDEM TARTRATE 5 MG/1
TABLET ORAL
Refills: 0 | COMMUNITY
Start: 2017-05-04 | End: 2017-06-20

## 2017-05-30 RX ADMIN — GADOBENATE DIMEGLUMINE 20 ML: 529 INJECTION, SOLUTION INTRAVENOUS at 09:30

## 2017-05-30 RX ADMIN — IOPAMIDOL 50 ML: 755 INJECTION, SOLUTION INTRAVENOUS at 10:30

## 2017-05-31 ENCOUNTER — OFFICE VISIT (OUTPATIENT)
Dept: ONCOLOGY | Facility: CLINIC | Age: 42
End: 2017-05-31

## 2017-05-31 ENCOUNTER — LAB (OUTPATIENT)
Dept: ONCOLOGY | Facility: CLINIC | Age: 42
End: 2017-05-31

## 2017-05-31 VITALS
WEIGHT: 240 LBS | BODY MASS INDEX: 43.9 KG/M2 | RESPIRATION RATE: 20 BRPM | OXYGEN SATURATION: 98 % | SYSTOLIC BLOOD PRESSURE: 125 MMHG | DIASTOLIC BLOOD PRESSURE: 88 MMHG | HEART RATE: 94 BPM | TEMPERATURE: 98.3 F

## 2017-05-31 DIAGNOSIS — C71.9 GLIOMA (HCC): Primary | ICD-10-CM

## 2017-05-31 DIAGNOSIS — E11.8 TYPE 2 DIABETES MELLITUS WITH COMPLICATION, WITHOUT LONG-TERM CURRENT USE OF INSULIN (HCC): ICD-10-CM

## 2017-05-31 DIAGNOSIS — C71.9 GLIOMA (HCC): ICD-10-CM

## 2017-05-31 PROBLEM — E11.9 TYPE 2 DIABETES MELLITUS (HCC): Status: ACTIVE | Noted: 2017-05-31

## 2017-05-31 LAB
ALBUMIN SERPL-MCNC: 4.3 G/DL (ref 3.5–5)
ALBUMIN/GLOB SERPL: 1.3 G/DL (ref 1.5–2.5)
ALP SERPL-CCNC: 83 U/L (ref 35–104)
ALT SERPL W P-5'-P-CCNC: 56 U/L (ref 10–36)
ANION GAP SERPL CALCULATED.3IONS-SCNC: 14 MMOL/L (ref 3.6–11.2)
AST SERPL-CCNC: 28 U/L (ref 10–30)
BASOPHILS # BLD AUTO: 0.02 10*3/MM3 (ref 0–0.3)
BASOPHILS NFR BLD AUTO: 0.2 % (ref 0–2)
BILIRUB SERPL-MCNC: 0.5 MG/DL (ref 0.2–1.8)
BUN BLD-MCNC: 16 MG/DL (ref 7–21)
BUN/CREAT SERPL: 18.6 (ref 7–25)
CALCIUM SPEC-SCNC: 9.7 MG/DL (ref 7.7–10)
CHLORIDE SERPL-SCNC: 102 MMOL/L (ref 99–112)
CO2 SERPL-SCNC: 23 MMOL/L (ref 24.3–31.9)
CREAT BLD-MCNC: 0.86 MG/DL (ref 0.43–1.29)
DEPRECATED RDW RBC AUTO: 40 FL (ref 37–54)
EOSINOPHIL # BLD AUTO: 0.03 10*3/MM3 (ref 0–0.7)
EOSINOPHIL NFR BLD AUTO: 0.3 % (ref 0–5)
ERYTHROCYTE [DISTWIDTH] IN BLOOD BY AUTOMATED COUNT: 13.3 % (ref 11.5–14.5)
GFR SERPL CREATININE-BSD FRML MDRD: 72 ML/MIN/1.73
GLOBULIN UR ELPH-MCNC: 3.3 GM/DL
GLUCOSE BLD-MCNC: 336 MG/DL (ref 70–110)
HCT VFR BLD AUTO: 44.7 % (ref 37–47)
HGB BLD-MCNC: 15.8 G/DL (ref 12–16)
IMM GRANULOCYTES # BLD: 0.03 10*3/MM3 (ref 0–0.03)
IMM GRANULOCYTES NFR BLD: 0.3 % (ref 0–0.5)
LYMPHOCYTES # BLD AUTO: 1.24 10*3/MM3 (ref 1–3)
LYMPHOCYTES NFR BLD AUTO: 12.1 % (ref 21–51)
MCH RBC QN AUTO: 29.4 PG (ref 27–33)
MCHC RBC AUTO-ENTMCNC: 35.3 G/DL (ref 33–37)
MCV RBC AUTO: 83.2 FL (ref 80–94)
MONOCYTES # BLD AUTO: 0.26 10*3/MM3 (ref 0.1–0.9)
MONOCYTES NFR BLD AUTO: 2.5 % (ref 0–10)
NEUTROPHILS # BLD AUTO: 8.69 10*3/MM3 (ref 1.4–6.5)
NEUTROPHILS NFR BLD AUTO: 84.6 % (ref 30–70)
OSMOLALITY SERPL CALC.SUM OF ELEC: 291.9 MOSM/KG (ref 273–305)
PLATELET # BLD AUTO: 289 10*3/MM3 (ref 130–400)
PMV BLD AUTO: 10.2 FL (ref 6–10)
POTASSIUM BLD-SCNC: 4.2 MMOL/L (ref 3.5–5.3)
PROT SERPL-MCNC: 7.6 G/DL (ref 6–8)
RBC # BLD AUTO: 5.37 10*6/MM3 (ref 4.2–5.4)
SODIUM BLD-SCNC: 139 MMOL/L (ref 135–153)
WBC NRBC COR # BLD: 10.27 10*3/MM3 (ref 4.5–12.5)

## 2017-05-31 PROCEDURE — 99214 OFFICE O/P EST MOD 30 MIN: CPT | Performed by: INTERNAL MEDICINE

## 2017-05-31 PROCEDURE — 36415 COLL VENOUS BLD VENIPUNCTURE: CPT | Performed by: INTERNAL MEDICINE

## 2017-05-31 PROCEDURE — 36415 COLL VENOUS BLD VENIPUNCTURE: CPT

## 2017-05-31 PROCEDURE — 80053 COMPREHEN METABOLIC PANEL: CPT | Performed by: INTERNAL MEDICINE

## 2017-05-31 PROCEDURE — 85025 COMPLETE CBC W/AUTO DIFF WBC: CPT | Performed by: INTERNAL MEDICINE

## 2017-05-31 RX ORDER — SODIUM CHLORIDE AND POTASSIUM CHLORIDE 150; 900 MG/100ML; MG/100ML
100 INJECTION, SOLUTION INTRAVENOUS CONTINUOUS
Status: CANCELLED | OUTPATIENT
Start: 2017-05-31

## 2017-05-31 RX ORDER — SODIUM CHLORIDE 0.9 % (FLUSH) 0.9 %
1-10 SYRINGE (ML) INJECTION AS NEEDED
Status: CANCELLED | OUTPATIENT
Start: 2017-05-31

## 2017-06-01 LAB — CREAT BLDA-MCNC: 0.8 MG/DL (ref 0.6–1.3)

## 2017-06-20 ENCOUNTER — HOSPITAL ENCOUNTER (OUTPATIENT)
Dept: MRI IMAGING | Facility: HOSPITAL | Age: 42
Discharge: HOME | End: 2017-06-20
Admitting: PHYSICIAN ASSISTANT

## 2017-06-20 ENCOUNTER — APPOINTMENT (OUTPATIENT)
Dept: PREADMISSION TESTING | Facility: HOSPITAL | Age: 42
End: 2017-06-20

## 2017-06-20 ENCOUNTER — ANESTHESIA EVENT (OUTPATIENT)
Dept: PERIOP | Facility: HOSPITAL | Age: 42
End: 2017-06-20

## 2017-06-20 VITALS — WEIGHT: 235.89 LBS | BODY MASS INDEX: 43.41 KG/M2 | HEIGHT: 62 IN

## 2017-06-20 DIAGNOSIS — C71.9 GLIOMA (HCC): ICD-10-CM

## 2017-06-20 LAB
ABO GROUP BLD: NORMAL
ANION GAP SERPL CALCULATED.3IONS-SCNC: 6 MMOL/L (ref 3–11)
BASOPHILS # BLD AUTO: 0.02 10*3/MM3 (ref 0–0.2)
BASOPHILS NFR BLD AUTO: 0.1 % (ref 0–1)
BLD GP AB SCN SERPL QL: NEGATIVE
BUN BLD-MCNC: 22 MG/DL (ref 9–23)
BUN/CREAT SERPL: 27.5 (ref 7–25)
CALCIUM SPEC-SCNC: 10.1 MG/DL (ref 8.7–10.4)
CHLORIDE SERPL-SCNC: 103 MMOL/L (ref 99–109)
CO2 SERPL-SCNC: 30 MMOL/L (ref 20–31)
CREAT BLD-MCNC: 0.8 MG/DL (ref 0.6–1.3)
DEPRECATED RDW RBC AUTO: 42.7 FL (ref 37–54)
EOSINOPHIL # BLD AUTO: 0.04 10*3/MM3 (ref 0.1–0.3)
EOSINOPHIL NFR BLD AUTO: 0.3 % (ref 0–3)
ERYTHROCYTE [DISTWIDTH] IN BLOOD BY AUTOMATED COUNT: 13.7 % (ref 11.3–14.5)
GFR SERPL CREATININE-BSD FRML MDRD: 78 ML/MIN/1.73
GLUCOSE BLD-MCNC: 176 MG/DL (ref 70–100)
HBA1C MFR BLD: 10 % (ref 4.8–5.6)
HCT VFR BLD AUTO: 46.4 % (ref 34.5–44)
HGB BLD-MCNC: 16 G/DL (ref 11.5–15.5)
IMM GRANULOCYTES # BLD: 0.1 10*3/MM3 (ref 0–0.03)
IMM GRANULOCYTES NFR BLD: 0.7 % (ref 0–0.6)
LYMPHOCYTES # BLD AUTO: 2.61 10*3/MM3 (ref 0.6–4.8)
LYMPHOCYTES NFR BLD AUTO: 19.1 % (ref 24–44)
MCH RBC QN AUTO: 29.6 PG (ref 27–31)
MCHC RBC AUTO-ENTMCNC: 34.5 G/DL (ref 32–36)
MCV RBC AUTO: 85.9 FL (ref 80–99)
MONOCYTES # BLD AUTO: 0.86 10*3/MM3 (ref 0–1)
MONOCYTES NFR BLD AUTO: 6.3 % (ref 0–12)
MRSA DNA SPEC QL NAA+PROBE: NEGATIVE
NEUTROPHILS # BLD AUTO: 10.06 10*3/MM3 (ref 1.5–8.3)
NEUTROPHILS NFR BLD AUTO: 73.5 % (ref 41–71)
PLATELET # BLD AUTO: 272 10*3/MM3 (ref 150–450)
PMV BLD AUTO: 9.5 FL (ref 6–12)
POTASSIUM BLD-SCNC: 4.3 MMOL/L (ref 3.5–5.5)
RBC # BLD AUTO: 5.4 10*6/MM3 (ref 3.89–5.14)
RH BLD: POSITIVE
SODIUM BLD-SCNC: 139 MMOL/L (ref 132–146)
WBC NRBC COR # BLD: 13.69 10*3/MM3 (ref 3.5–10.8)

## 2017-06-20 PROCEDURE — 93010 ELECTROCARDIOGRAM REPORT: CPT | Performed by: INTERNAL MEDICINE

## 2017-06-20 RX ORDER — SERTRALINE HYDROCHLORIDE 100 MG/1
150 TABLET, FILM COATED ORAL DAILY
COMMUNITY

## 2017-06-20 RX ORDER — ZOLPIDEM TARTRATE 5 MG/1
5 TABLET ORAL NIGHTLY
COMMUNITY

## 2017-06-20 RX ORDER — ARIPIPRAZOLE 5 MG/1
5 TABLET ORAL DAILY
COMMUNITY

## 2017-06-20 RX ORDER — LISINOPRIL AND HYDROCHLOROTHIAZIDE 20; 12.5 MG/1; MG/1
1 TABLET ORAL DAILY
COMMUNITY

## 2017-06-20 RX ADMIN — GADOBENATE DIMEGLUMINE 19 ML: 529 INJECTION, SOLUTION INTRAVENOUS at 15:35

## 2017-06-21 ENCOUNTER — APPOINTMENT (OUTPATIENT)
Dept: LAB | Facility: HOSPITAL | Age: 42
End: 2017-06-21

## 2017-06-21 ENCOUNTER — HOSPITAL ENCOUNTER (INPATIENT)
Facility: HOSPITAL | Age: 42
LOS: 3 days | Discharge: HOME | End: 2017-06-24
Attending: NEUROLOGICAL SURGERY | Admitting: NEUROLOGICAL SURGERY

## 2017-06-21 ENCOUNTER — ANESTHESIA (OUTPATIENT)
Dept: PERIOP | Facility: HOSPITAL | Age: 42
End: 2017-06-21

## 2017-06-21 DIAGNOSIS — C71.9 ASTROCYTOMA (HCC): ICD-10-CM

## 2017-06-21 DIAGNOSIS — Z74.09 IMPAIRED FUNCTIONAL MOBILITY, BALANCE, GAIT, AND ENDURANCE: Primary | ICD-10-CM

## 2017-06-21 DIAGNOSIS — E11.8 TYPE 2 DIABETES MELLITUS WITH COMPLICATION, WITHOUT LONG-TERM CURRENT USE OF INSULIN (HCC): ICD-10-CM

## 2017-06-21 DIAGNOSIS — C71.9 GLIOMA (HCC): ICD-10-CM

## 2017-06-21 LAB
B-HCG UR QL: NEGATIVE
BILIRUB UR QL STRIP: NEGATIVE
CLARITY UR: CLEAR
COLOR UR: YELLOW
GLUCOSE BLDC GLUCOMTR-MCNC: 200 MG/DL (ref 70–130)
GLUCOSE BLDC GLUCOMTR-MCNC: 274 MG/DL (ref 70–130)
GLUCOSE BLDC GLUCOMTR-MCNC: 276 MG/DL (ref 70–130)
GLUCOSE BLDC GLUCOMTR-MCNC: 297 MG/DL (ref 70–130)
GLUCOSE UR STRIP-MCNC: ABNORMAL MG/DL
HGB UR QL STRIP.AUTO: NEGATIVE
INTERNAL NEGATIVE CONTROL: NORMAL
INTERNAL POSITIVE CONTROL: REACTIVE
KETONES UR QL STRIP: ABNORMAL
LEUKOCYTE ESTERASE UR QL STRIP.AUTO: NEGATIVE
Lab: NORMAL
NITRITE UR QL STRIP: NEGATIVE
PH UR STRIP.AUTO: 5.5 [PH] (ref 5–8)
PROT UR QL STRIP: NEGATIVE
SP GR UR STRIP: 1.02 (ref 1–1.03)
UROBILINOGEN UR QL STRIP: ABNORMAL

## 2017-06-21 PROCEDURE — 63710000001 INSULIN LISPRO (HUMAN) PER 5 UNITS: Performed by: NURSE ANESTHETIST, CERTIFIED REGISTERED

## 2017-06-21 PROCEDURE — C1713 ANCHOR/SCREW BN/BN,TIS/BN: HCPCS | Performed by: NEUROLOGICAL SURGERY

## 2017-06-21 PROCEDURE — 25010000002 PROMETHAZINE PER 50 MG: Performed by: NEUROLOGICAL SURGERY

## 2017-06-21 PROCEDURE — 25010000002 PROPOFOL 1000 MG/ML EMULSION: Performed by: NURSE ANESTHETIST, CERTIFIED REGISTERED

## 2017-06-21 PROCEDURE — 82962 GLUCOSE BLOOD TEST: CPT

## 2017-06-21 PROCEDURE — 25010000002 ONDANSETRON PER 1 MG: Performed by: NEUROLOGICAL SURGERY

## 2017-06-21 PROCEDURE — 00B70ZZ EXCISION OF CEREBRAL HEMISPHERE, OPEN APPROACH: ICD-10-PCS | Performed by: NEUROLOGICAL SURGERY

## 2017-06-21 PROCEDURE — S0260 H&P FOR SURGERY: HCPCS | Performed by: NEUROLOGICAL SURGERY

## 2017-06-21 PROCEDURE — 25010000002 FUROSEMIDE PER 20 MG: Performed by: NURSE ANESTHETIST, CERTIFIED REGISTERED

## 2017-06-21 PROCEDURE — 25010000002 NEOSTIGMINE 10 MG/10ML SOLUTION: Performed by: NURSE ANESTHETIST, CERTIFIED REGISTERED

## 2017-06-21 PROCEDURE — 88271 CYTOGENETICS DNA PROBE: CPT | Performed by: NEUROLOGICAL SURGERY

## 2017-06-21 PROCEDURE — 25010000003 CEFAZOLIN IN DEXTROSE 2-4 GM/100ML-% SOLUTION: Performed by: NEUROLOGICAL SURGERY

## 2017-06-21 PROCEDURE — 25010000002 PROMETHAZINE PER 50 MG: Performed by: NURSE ANESTHETIST, CERTIFIED REGISTERED

## 2017-06-21 PROCEDURE — 88331 PATH CONSLTJ SURG 1 BLK 1SPC: CPT | Performed by: NEUROLOGICAL SURGERY

## 2017-06-21 PROCEDURE — 25010000002 SUCCINYLCHOLINE PER 20 MG: Performed by: NURSE ANESTHETIST, CERTIFIED REGISTERED

## 2017-06-21 PROCEDURE — 25010000002 MIDAZOLAM PER 1 MG: Performed by: NURSE ANESTHETIST, CERTIFIED REGISTERED

## 2017-06-21 PROCEDURE — 25010000003 CEFAZOLIN IN DEXTROSE 2-4 GM/100ML-% SOLUTION: Performed by: PHYSICIAN ASSISTANT

## 2017-06-21 PROCEDURE — 88305 TISSUE EXAM BY PATHOLOGIST: CPT | Performed by: NEUROLOGICAL SURGERY

## 2017-06-21 PROCEDURE — 25010000002 PROMETHAZINE PER 50 MG: Performed by: PHYSICIAN ASSISTANT

## 2017-06-21 PROCEDURE — 81003 URINALYSIS AUTO W/O SCOPE: CPT | Performed by: NEUROLOGICAL SURGERY

## 2017-06-21 PROCEDURE — 25010000002 ONDANSETRON PER 1 MG: Performed by: NURSE ANESTHETIST, CERTIFIED REGISTERED

## 2017-06-21 PROCEDURE — 88381 MICRODISSECTION MANUAL: CPT | Performed by: NEUROLOGICAL SURGERY

## 2017-06-21 PROCEDURE — 25010000002 DEXAMETHASONE PER 1 MG: Performed by: NURSE ANESTHETIST, CERTIFIED REGISTERED

## 2017-06-21 PROCEDURE — 81403 MOPATH PROCEDURE LEVEL 4: CPT | Performed by: NEUROLOGICAL SURGERY

## 2017-06-21 PROCEDURE — 88307 TISSUE EXAM BY PATHOLOGIST: CPT | Performed by: NEUROLOGICAL SURGERY

## 2017-06-21 PROCEDURE — 25010000002 DEXAMETHASONE PER 1 MG: Performed by: NEUROLOGICAL SURGERY

## 2017-06-21 PROCEDURE — 63710000001 INSULIN LISPRO (HUMAN) PER 5 UNITS: Performed by: NEUROLOGICAL SURGERY

## 2017-06-21 PROCEDURE — 88275 CYTOGENETICS 100-300: CPT | Performed by: NEUROLOGICAL SURGERY

## 2017-06-21 PROCEDURE — 25010000002 PROPOFOL 10 MG/ML EMULSION: Performed by: NURSE ANESTHETIST, CERTIFIED REGISTERED

## 2017-06-21 PROCEDURE — 61510 CRNEC TREPH EXC BRN TUM STTL: CPT | Performed by: NEUROLOGICAL SURGERY

## 2017-06-21 PROCEDURE — 25010000002 FENTANYL CITRATE (PF) 100 MCG/2ML SOLUTION: Performed by: NURSE ANESTHETIST, CERTIFIED REGISTERED

## 2017-06-21 PROCEDURE — 61781 SCAN PROC CRANIAL INTRA: CPT | Performed by: NEUROLOGICAL SURGERY

## 2017-06-21 PROCEDURE — 87086 URINE CULTURE/COLONY COUNT: CPT | Performed by: NEUROLOGICAL SURGERY

## 2017-06-21 PROCEDURE — 81287 MGMT GENE PRMTR MTHYLTN ALYS: CPT | Performed by: NEUROLOGICAL SURGERY

## 2017-06-21 PROCEDURE — 25810000003 POTASSIUM CHLORIDE PER 2 MEQ: Performed by: NEUROLOGICAL SURGERY

## 2017-06-21 DEVICE — DURAGEN® PLUS DURAL REGENERATION MATRIX, 3 IN X 3 IN (7.5 CM X 7.5 CM)
Type: IMPLANTABLE DEVICE | Site: BRAIN | Status: FUNCTIONAL
Brand: DURAGEN® PLUS

## 2017-06-21 DEVICE — PLT MATRIXNEURO STR TI 2HL 12MM: Type: IMPLANTABLE DEVICE | Site: CRANIAL | Status: FUNCTIONAL

## 2017-06-21 DEVICE — SCRW MATRIXNEURO SD TI 5MM: Type: IMPLANTABLE DEVICE | Site: CRANIAL | Status: FUNCTIONAL

## 2017-06-21 DEVICE — SCRW MATRIXNEURO SD TI 4MM: Type: IMPLANTABLE DEVICE | Site: CRANIAL | Status: FUNCTIONAL

## 2017-06-21 DEVICE — PLT BX MATRIXNEURO TI 14X14MM: Type: IMPLANTABLE DEVICE | Site: CRANIAL | Status: FUNCTIONAL

## 2017-06-21 DEVICE — PERI-GUARD REPAIR PATCH (PERI-GUARD) IS A BIOLOGIC TISSUE PREPARED FROM BOVINE PERICARDIUM CROSS-LINKED WITH GLUTARALDEHYDE. THE PERICARDIUM IS PROCURED FROM CATTLE ORIGINATING IN THE UNITED STATES. PERI-GUARD IS CHEMICALLY STERILIZED USING ETHANOL AND PROPYLENE OXIDE. PERI-GUARD IS TREATED WITH 1 MOLAR SODIUM HYDROXIDE FOR 60-75 MINUTES AT 20-25C.
Type: IMPLANTABLE DEVICE | Site: BRAIN | Status: FUNCTIONAL
Brand: PERI-GUARD

## 2017-06-21 DEVICE — IMPLANTABLE DEVICE: Type: IMPLANTABLE DEVICE | Site: CRANIAL | Status: FUNCTIONAL

## 2017-06-21 DEVICE — PLT CVR BURHL MATRIXNEURO TI 17MM: Type: IMPLANTABLE DEVICE | Site: CRANIAL | Status: FUNCTIONAL

## 2017-06-21 RX ORDER — ONDANSETRON 2 MG/ML
INJECTION INTRAMUSCULAR; INTRAVENOUS AS NEEDED
Status: DISCONTINUED | OUTPATIENT
Start: 2017-06-21 | End: 2017-06-21 | Stop reason: SURG

## 2017-06-21 RX ORDER — NALOXONE HCL 0.4 MG/ML
0.4 VIAL (ML) INJECTION
Status: DISCONTINUED | OUTPATIENT
Start: 2017-06-21 | End: 2017-06-24 | Stop reason: HOSPADM

## 2017-06-21 RX ORDER — SODIUM CHLORIDE, SODIUM LACTATE, POTASSIUM CHLORIDE, CALCIUM CHLORIDE 600; 310; 30; 20 MG/100ML; MG/100ML; MG/100ML; MG/100ML
20 INJECTION, SOLUTION INTRAVENOUS CONTINUOUS
Status: DISCONTINUED | OUTPATIENT
Start: 2017-06-21 | End: 2017-06-21

## 2017-06-21 RX ORDER — HYDROMORPHONE HYDROCHLORIDE 1 MG/ML
0.5 INJECTION, SOLUTION INTRAMUSCULAR; INTRAVENOUS; SUBCUTANEOUS
Status: DISCONTINUED | OUTPATIENT
Start: 2017-06-21 | End: 2017-06-21 | Stop reason: HOSPADM

## 2017-06-21 RX ORDER — SODIUM CHLORIDE 0.9 % (FLUSH) 0.9 %
1-10 SYRINGE (ML) INJECTION AS NEEDED
Status: DISCONTINUED | OUTPATIENT
Start: 2017-06-21 | End: 2017-06-21 | Stop reason: HOSPADM

## 2017-06-21 RX ORDER — OXYCODONE AND ACETAMINOPHEN 7.5; 325 MG/1; MG/1
2 TABLET ORAL EVERY 4 HOURS PRN
Status: DISCONTINUED | OUTPATIENT
Start: 2017-06-21 | End: 2017-06-24 | Stop reason: HOSPADM

## 2017-06-21 RX ORDER — MAGNESIUM HYDROXIDE 1200 MG/15ML
LIQUID ORAL AS NEEDED
Status: DISCONTINUED | OUTPATIENT
Start: 2017-06-21 | End: 2017-06-21 | Stop reason: HOSPADM

## 2017-06-21 RX ORDER — GLYCOPYRROLATE 0.2 MG/ML
INJECTION INTRAMUSCULAR; INTRAVENOUS AS NEEDED
Status: DISCONTINUED | OUTPATIENT
Start: 2017-06-21 | End: 2017-06-21 | Stop reason: SURG

## 2017-06-21 RX ORDER — CLONAZEPAM 1 MG/1
1 TABLET ORAL 2 TIMES DAILY
Status: DISCONTINUED | OUTPATIENT
Start: 2017-06-21 | End: 2017-06-24 | Stop reason: HOSPADM

## 2017-06-21 RX ORDER — PROMETHAZINE HYDROCHLORIDE 25 MG/1
25 SUPPOSITORY RECTAL ONCE AS NEEDED
Status: COMPLETED | OUTPATIENT
Start: 2017-06-21 | End: 2017-06-21

## 2017-06-21 RX ORDER — PROCHLORPERAZINE MALEATE 10 MG
10 TABLET ORAL EVERY 6 HOURS PRN
Status: DISCONTINUED | OUTPATIENT
Start: 2017-06-21 | End: 2017-06-24 | Stop reason: HOSPADM

## 2017-06-21 RX ORDER — OXYCODONE AND ACETAMINOPHEN 7.5; 325 MG/1; MG/1
1 TABLET ORAL EVERY 4 HOURS PRN
Status: DISCONTINUED | OUTPATIENT
Start: 2017-06-21 | End: 2017-06-24 | Stop reason: HOSPADM

## 2017-06-21 RX ORDER — LIDOCAINE HYDROCHLORIDE 10 MG/ML
INJECTION, SOLUTION EPIDURAL; INFILTRATION; INTRACAUDAL; PERINEURAL AS NEEDED
Status: DISCONTINUED | OUTPATIENT
Start: 2017-06-21 | End: 2017-06-21 | Stop reason: SURG

## 2017-06-21 RX ORDER — FENTANYL CITRATE 50 UG/ML
INJECTION, SOLUTION INTRAMUSCULAR; INTRAVENOUS AS NEEDED
Status: DISCONTINUED | OUTPATIENT
Start: 2017-06-21 | End: 2017-06-21 | Stop reason: SURG

## 2017-06-21 RX ORDER — ONDANSETRON 4 MG/1
4 TABLET, FILM COATED ORAL EVERY 8 HOURS PRN
Status: DISCONTINUED | OUTPATIENT
Start: 2017-06-21 | End: 2017-06-23 | Stop reason: SDUPTHER

## 2017-06-21 RX ORDER — LABETALOL HYDROCHLORIDE 5 MG/ML
INJECTION, SOLUTION INTRAVENOUS AS NEEDED
Status: DISCONTINUED | OUTPATIENT
Start: 2017-06-21 | End: 2017-06-21 | Stop reason: SURG

## 2017-06-21 RX ORDER — DEXAMETHASONE SODIUM PHOSPHATE 4 MG/ML
4 INJECTION, SOLUTION INTRA-ARTICULAR; INTRALESIONAL; INTRAMUSCULAR; INTRAVENOUS; SOFT TISSUE EVERY 6 HOURS
Status: DISCONTINUED | OUTPATIENT
Start: 2017-06-21 | End: 2017-06-24 | Stop reason: HOSPADM

## 2017-06-21 RX ORDER — FUROSEMIDE 10 MG/ML
INJECTION INTRAMUSCULAR; INTRAVENOUS AS NEEDED
Status: DISCONTINUED | OUTPATIENT
Start: 2017-06-21 | End: 2017-06-21 | Stop reason: SURG

## 2017-06-21 RX ORDER — FAMOTIDINE 20 MG/1
20 TABLET, FILM COATED ORAL 2 TIMES DAILY
Status: DISCONTINUED | OUTPATIENT
Start: 2017-06-21 | End: 2017-06-21 | Stop reason: HOSPADM

## 2017-06-21 RX ORDER — ONDANSETRON 4 MG/1
4 TABLET, FILM COATED ORAL EVERY 6 HOURS PRN
Status: DISCONTINUED | OUTPATIENT
Start: 2017-06-21 | End: 2017-06-24 | Stop reason: HOSPADM

## 2017-06-21 RX ORDER — PROMETHAZINE HYDROCHLORIDE 25 MG/ML
6.25 INJECTION, SOLUTION INTRAMUSCULAR; INTRAVENOUS ONCE AS NEEDED
Status: COMPLETED | OUTPATIENT
Start: 2017-06-21 | End: 2017-06-21

## 2017-06-21 RX ORDER — FAMOTIDINE 20 MG/1
20 TABLET, FILM COATED ORAL ONCE
Status: DISCONTINUED | OUTPATIENT
Start: 2017-06-21 | End: 2017-06-21 | Stop reason: HOSPADM

## 2017-06-21 RX ORDER — FENTANYL CITRATE 50 UG/ML
50 INJECTION, SOLUTION INTRAMUSCULAR; INTRAVENOUS
Status: DISCONTINUED | OUTPATIENT
Start: 2017-06-21 | End: 2017-06-21 | Stop reason: HOSPADM

## 2017-06-21 RX ORDER — HYDROMORPHONE HYDROCHLORIDE 1 MG/ML
0.5 INJECTION, SOLUTION INTRAMUSCULAR; INTRAVENOUS; SUBCUTANEOUS
Status: DISCONTINUED | OUTPATIENT
Start: 2017-06-21 | End: 2017-06-24 | Stop reason: HOSPADM

## 2017-06-21 RX ORDER — MANNITOL 20 G/100ML
INJECTION, SOLUTION INTRAVENOUS CONTINUOUS PRN
Status: DISCONTINUED | OUTPATIENT
Start: 2017-06-21 | End: 2017-06-21 | Stop reason: SURG

## 2017-06-21 RX ORDER — LABETALOL HYDROCHLORIDE 5 MG/ML
5 INJECTION, SOLUTION INTRAVENOUS
Status: DISCONTINUED | OUTPATIENT
Start: 2017-06-21 | End: 2017-06-21 | Stop reason: HOSPADM

## 2017-06-21 RX ORDER — DEXTROSE MONOHYDRATE 25 G/50ML
25 INJECTION, SOLUTION INTRAVENOUS
Status: DISCONTINUED | OUTPATIENT
Start: 2017-06-21 | End: 2017-06-24 | Stop reason: HOSPADM

## 2017-06-21 RX ORDER — SODIUM CHLORIDE, SODIUM LACTATE, POTASSIUM CHLORIDE, CALCIUM CHLORIDE 600; 310; 30; 20 MG/100ML; MG/100ML; MG/100ML; MG/100ML
9 INJECTION, SOLUTION INTRAVENOUS CONTINUOUS
Status: DISCONTINUED | OUTPATIENT
Start: 2017-06-21 | End: 2017-06-21

## 2017-06-21 RX ORDER — PROMETHAZINE HYDROCHLORIDE 25 MG/ML
12.5 INJECTION, SOLUTION INTRAMUSCULAR; INTRAVENOUS EVERY 6 HOURS PRN
Status: DISCONTINUED | OUTPATIENT
Start: 2017-06-21 | End: 2017-06-24 | Stop reason: HOSPADM

## 2017-06-21 RX ORDER — BISOPROLOL FUMARATE AND HYDROCHLOROTHIAZIDE 2.5; 6.25 MG/1; MG/1
1 TABLET ORAL
Status: DISCONTINUED | OUTPATIENT
Start: 2017-06-21 | End: 2017-06-24 | Stop reason: HOSPADM

## 2017-06-21 RX ORDER — VECURONIUM BROMIDE 1 MG/ML
INJECTION, POWDER, LYOPHILIZED, FOR SOLUTION INTRAVENOUS AS NEEDED
Status: DISCONTINUED | OUTPATIENT
Start: 2017-06-21 | End: 2017-06-21 | Stop reason: SURG

## 2017-06-21 RX ORDER — ACETAMINOPHEN 325 MG/1
650 TABLET ORAL EVERY 4 HOURS PRN
Status: DISCONTINUED | OUTPATIENT
Start: 2017-06-21 | End: 2017-06-24 | Stop reason: HOSPADM

## 2017-06-21 RX ORDER — NICOTINE POLACRILEX 4 MG
15 LOZENGE BUCCAL
Status: DISCONTINUED | OUTPATIENT
Start: 2017-06-21 | End: 2017-06-24 | Stop reason: HOSPADM

## 2017-06-21 RX ORDER — LIDOCAINE HYDROCHLORIDE 10 MG/ML
1 INJECTION, SOLUTION INFILTRATION; PERINEURAL ONCE
Status: COMPLETED | OUTPATIENT
Start: 2017-06-21 | End: 2017-06-21

## 2017-06-21 RX ORDER — PROMETHAZINE HYDROCHLORIDE 25 MG/ML
6.25 INJECTION, SOLUTION INTRAMUSCULAR; INTRAVENOUS EVERY 6 HOURS PRN
Status: DISCONTINUED | OUTPATIENT
Start: 2017-06-21 | End: 2017-06-21 | Stop reason: HOSPADM

## 2017-06-21 RX ORDER — PROPOFOL 10 MG/ML
VIAL (ML) INTRAVENOUS AS NEEDED
Status: DISCONTINUED | OUTPATIENT
Start: 2017-06-21 | End: 2017-06-21 | Stop reason: SURG

## 2017-06-21 RX ORDER — CEFAZOLIN SODIUM 2 G/100ML
2 INJECTION, SOLUTION INTRAVENOUS ONCE
Status: COMPLETED | OUTPATIENT
Start: 2017-06-21 | End: 2017-06-21

## 2017-06-21 RX ORDER — NEOSTIGMINE METHYLSULFATE 1 MG/ML
INJECTION, SOLUTION INTRAVENOUS AS NEEDED
Status: DISCONTINUED | OUTPATIENT
Start: 2017-06-21 | End: 2017-06-21 | Stop reason: SURG

## 2017-06-21 RX ORDER — CEFAZOLIN SODIUM 2 G/100ML
2 INJECTION, SOLUTION INTRAVENOUS EVERY 8 HOURS
Status: COMPLETED | OUTPATIENT
Start: 2017-06-21 | End: 2017-06-22

## 2017-06-21 RX ORDER — GABAPENTIN 400 MG/1
800 CAPSULE ORAL EVERY 8 HOURS SCHEDULED
Status: DISCONTINUED | OUTPATIENT
Start: 2017-06-21 | End: 2017-06-24 | Stop reason: HOSPADM

## 2017-06-21 RX ORDER — ONDANSETRON 2 MG/ML
4 INJECTION INTRAMUSCULAR; INTRAVENOUS ONCE AS NEEDED
Status: DISCONTINUED | OUTPATIENT
Start: 2017-06-21 | End: 2017-06-21 | Stop reason: HOSPADM

## 2017-06-21 RX ORDER — SODIUM CHLORIDE AND POTASSIUM CHLORIDE 150; 900 MG/100ML; MG/100ML
100 INJECTION, SOLUTION INTRAVENOUS CONTINUOUS
Status: DISCONTINUED | OUTPATIENT
Start: 2017-06-21 | End: 2017-06-21 | Stop reason: ALTCHOICE

## 2017-06-21 RX ORDER — MIDAZOLAM HYDROCHLORIDE 1 MG/ML
INJECTION INTRAMUSCULAR; INTRAVENOUS AS NEEDED
Status: DISCONTINUED | OUTPATIENT
Start: 2017-06-21 | End: 2017-06-21 | Stop reason: SURG

## 2017-06-21 RX ORDER — ARIPIPRAZOLE 10 MG/1
5 TABLET ORAL DAILY
Status: DISCONTINUED | OUTPATIENT
Start: 2017-06-21 | End: 2017-06-24 | Stop reason: HOSPADM

## 2017-06-21 RX ORDER — ONDANSETRON 2 MG/ML
4 INJECTION INTRAMUSCULAR; INTRAVENOUS EVERY 6 HOURS PRN
Status: DISCONTINUED | OUTPATIENT
Start: 2017-06-21 | End: 2017-06-24 | Stop reason: HOSPADM

## 2017-06-21 RX ORDER — ESMOLOL HYDROCHLORIDE 10 MG/ML
INJECTION INTRAVENOUS AS NEEDED
Status: DISCONTINUED | OUTPATIENT
Start: 2017-06-21 | End: 2017-06-21 | Stop reason: SURG

## 2017-06-21 RX ORDER — PROMETHAZINE HYDROCHLORIDE 25 MG/1
25 TABLET ORAL ONCE AS NEEDED
Status: COMPLETED | OUTPATIENT
Start: 2017-06-21 | End: 2017-06-21

## 2017-06-21 RX ORDER — SODIUM CHLORIDE 9 MG/ML
INJECTION, SOLUTION INTRAVENOUS CONTINUOUS PRN
Status: DISCONTINUED | OUTPATIENT
Start: 2017-06-21 | End: 2017-06-21 | Stop reason: SURG

## 2017-06-21 RX ORDER — SUCCINYLCHOLINE CHLORIDE 20 MG/ML
INJECTION INTRAMUSCULAR; INTRAVENOUS AS NEEDED
Status: DISCONTINUED | OUTPATIENT
Start: 2017-06-21 | End: 2017-06-21 | Stop reason: SURG

## 2017-06-21 RX ORDER — DEXAMETHASONE SODIUM PHOSPHATE 10 MG/ML
INJECTION INTRAMUSCULAR; INTRAVENOUS AS NEEDED
Status: DISCONTINUED | OUTPATIENT
Start: 2017-06-21 | End: 2017-06-21 | Stop reason: SURG

## 2017-06-21 RX ORDER — SODIUM CHLORIDE AND POTASSIUM CHLORIDE 150; 450 MG/100ML; MG/100ML
100 INJECTION, SOLUTION INTRAVENOUS CONTINUOUS
Status: DISCONTINUED | OUTPATIENT
Start: 2017-06-21 | End: 2017-06-24 | Stop reason: HOSPADM

## 2017-06-21 RX ORDER — FAMOTIDINE 10 MG/ML
20 INJECTION, SOLUTION INTRAVENOUS ONCE
Status: DISCONTINUED | OUTPATIENT
Start: 2017-06-21 | End: 2017-06-21 | Stop reason: HOSPADM

## 2017-06-21 RX ORDER — DOCUSATE SODIUM 100 MG/1
100 CAPSULE, LIQUID FILLED ORAL 2 TIMES DAILY
Status: DISCONTINUED | OUTPATIENT
Start: 2017-06-21 | End: 2017-06-24 | Stop reason: HOSPADM

## 2017-06-21 RX ORDER — LIDOCAINE HYDROCHLORIDE 10 MG/ML
0.5 INJECTION, SOLUTION EPIDURAL; INFILTRATION; INTRACAUDAL; PERINEURAL ONCE AS NEEDED
Status: DISCONTINUED | OUTPATIENT
Start: 2017-06-21 | End: 2017-06-21 | Stop reason: HOSPADM

## 2017-06-21 RX ADMIN — VECURONIUM BROMIDE 5 MG: 1 INJECTION, POWDER, LYOPHILIZED, FOR SOLUTION INTRAVENOUS at 07:10

## 2017-06-21 RX ADMIN — SODIUM CHLORIDE 5 MG/HR: 9 INJECTION, SOLUTION INTRAVENOUS at 10:25

## 2017-06-21 RX ADMIN — SERTRALINE 150 MG: 100 TABLET, FILM COATED ORAL at 13:04

## 2017-06-21 RX ADMIN — POTASSIUM CHLORIDE AND SODIUM CHLORIDE 100 ML/HR: 450; 150 INJECTION, SOLUTION INTRAVENOUS at 10:57

## 2017-06-21 RX ADMIN — PROPOFOL 25 MCG/KG/MIN: 10 INJECTION, EMULSION INTRAVENOUS at 07:51

## 2017-06-21 RX ADMIN — POTASSIUM CHLORIDE AND SODIUM CHLORIDE 100 ML/HR: 450; 150 INJECTION, SOLUTION INTRAVENOUS at 21:16

## 2017-06-21 RX ADMIN — LABETALOL HYDROCHLORIDE 10 MG: 5 INJECTION, SOLUTION INTRAVENOUS at 10:02

## 2017-06-21 RX ADMIN — CEFAZOLIN SODIUM 2 G: 2 INJECTION, SOLUTION INTRAVENOUS at 16:20

## 2017-06-21 RX ADMIN — FENTANYL CITRATE 50 MCG: 50 INJECTION, SOLUTION INTRAMUSCULAR; INTRAVENOUS at 09:30

## 2017-06-21 RX ADMIN — CLONAZEPAM 1 MG: 1 TABLET ORAL at 17:01

## 2017-06-21 RX ADMIN — DEXAMETHASONE SODIUM PHOSPHATE 4 MG: 4 INJECTION, SOLUTION INTRA-ARTICULAR; INTRALESIONAL; INTRAMUSCULAR; INTRAVENOUS; SOFT TISSUE at 17:01

## 2017-06-21 RX ADMIN — VECURONIUM BROMIDE 3 MG: 1 INJECTION, POWDER, LYOPHILIZED, FOR SOLUTION INTRAVENOUS at 08:20

## 2017-06-21 RX ADMIN — PROMETHAZINE HYDROCHLORIDE 12.5 MG: 25 INJECTION INTRAMUSCULAR; INTRAVENOUS at 13:17

## 2017-06-21 RX ADMIN — SODIUM CHLORIDE, POTASSIUM CHLORIDE, SODIUM LACTATE AND CALCIUM CHLORIDE 20 ML/HR: 600; 310; 30; 20 INJECTION, SOLUTION INTRAVENOUS at 06:10

## 2017-06-21 RX ADMIN — SODIUM CHLORIDE, POTASSIUM CHLORIDE, SODIUM LACTATE AND CALCIUM CHLORIDE: 600; 310; 30; 20 INJECTION, SOLUTION INTRAVENOUS at 06:56

## 2017-06-21 RX ADMIN — MIDAZOLAM HYDROCHLORIDE 2 MG: 1 INJECTION, SOLUTION INTRAMUSCULAR; INTRAVENOUS at 06:46

## 2017-06-21 RX ADMIN — CEFAZOLIN SODIUM 2 G: 2 INJECTION, SOLUTION INTRAVENOUS at 06:56

## 2017-06-21 RX ADMIN — FENTANYL CITRATE 50 MCG: 50 INJECTION, SOLUTION INTRAMUSCULAR; INTRAVENOUS at 10:06

## 2017-06-21 RX ADMIN — SODIUM CHLORIDE: 9 INJECTION, SOLUTION INTRAVENOUS at 06:56

## 2017-06-21 RX ADMIN — PROMETHAZINE HYDROCHLORIDE 6.25 MG: 25 INJECTION INTRAMUSCULAR; INTRAVENOUS at 11:06

## 2017-06-21 RX ADMIN — LINAGLIPTIN 5 MG: 5 TABLET, FILM COATED ORAL at 13:05

## 2017-06-21 RX ADMIN — REMIFENTANIL HYDROCHLORIDE 0.12 MCG/KG/MIN: 1 INJECTION, POWDER, LYOPHILIZED, FOR SOLUTION INTRAVENOUS at 07:40

## 2017-06-21 RX ADMIN — BISOPROLOL FUMARATE AND HYDROCHLOROTHIAZIDE 1 TABLET: 6.25; 2.5 TABLET ORAL at 13:05

## 2017-06-21 RX ADMIN — OXYCODONE HYDROCHLORIDE AND ACETAMINOPHEN 2 TABLET: 7.5; 325 TABLET ORAL at 13:08

## 2017-06-21 RX ADMIN — GLYCOPYRROLATE 0.4 MG: 0.2 INJECTION, SOLUTION INTRAMUSCULAR; INTRAVENOUS at 09:33

## 2017-06-21 RX ADMIN — PROPOFOL 200 MG: 10 INJECTION, EMULSION INTRAVENOUS at 07:02

## 2017-06-21 RX ADMIN — ONDANSETRON 4 MG: 2 INJECTION INTRAMUSCULAR; INTRAVENOUS at 09:29

## 2017-06-21 RX ADMIN — DEXAMETHASONE SODIUM PHOSPHATE 10 MG: 10 INJECTION INTRAMUSCULAR; INTRAVENOUS at 07:08

## 2017-06-21 RX ADMIN — ONDANSETRON 4 MG: 2 INJECTION INTRAMUSCULAR; INTRAVENOUS at 17:01

## 2017-06-21 RX ADMIN — ESMOLOL HYDROCHLORIDE 20 MG: 10 INJECTION, SOLUTION INTRAVENOUS at 09:35

## 2017-06-21 RX ADMIN — FAMOTIDINE 20 MG: 20 TABLET ORAL at 06:05

## 2017-06-21 RX ADMIN — VECURONIUM BROMIDE 2 MG: 1 INJECTION, POWDER, LYOPHILIZED, FOR SOLUTION INTRAVENOUS at 07:51

## 2017-06-21 RX ADMIN — PROMETHAZINE HYDROCHLORIDE 6.25 MG: 25 INJECTION INTRAMUSCULAR; INTRAVENOUS at 10:15

## 2017-06-21 RX ADMIN — POTASSIUM CHLORIDE AND SODIUM CHLORIDE 100 ML/HR: 450; 150 INJECTION, SOLUTION INTRAVENOUS at 11:51

## 2017-06-21 RX ADMIN — LABETALOL HYDROCHLORIDE 10 MG: 5 INJECTION, SOLUTION INTRAVENOUS at 09:45

## 2017-06-21 RX ADMIN — FENTANYL CITRATE 50 MCG: 50 INJECTION, SOLUTION INTRAMUSCULAR; INTRAVENOUS at 07:35

## 2017-06-21 RX ADMIN — INSULIN LISPRO 6 UNITS: 100 INJECTION, SOLUTION INTRAVENOUS; SUBCUTANEOUS at 21:10

## 2017-06-21 RX ADMIN — PROMETHAZINE HYDROCHLORIDE 12.5 MG: 25 INJECTION INTRAMUSCULAR; INTRAVENOUS at 19:21

## 2017-06-21 RX ADMIN — NEOSTIGMINE METHYLSULFATE 3 MG: 1 INJECTION, SOLUTION INTRAVENOUS at 09:33

## 2017-06-21 RX ADMIN — LIDOCAINE HYDROCHLORIDE 0.5 ML: 10 INJECTION, SOLUTION EPIDURAL; INFILTRATION; INTRACAUDAL; PERINEURAL at 06:09

## 2017-06-21 RX ADMIN — SUCCINYLCHOLINE CHLORIDE 120 MG: 20 INJECTION, SOLUTION INTRAMUSCULAR; INTRAVENOUS at 07:02

## 2017-06-21 RX ADMIN — GABAPENTIN 800 MG: 400 CAPSULE ORAL at 13:05

## 2017-06-21 RX ADMIN — GABAPENTIN 800 MG: 400 CAPSULE ORAL at 21:11

## 2017-06-21 RX ADMIN — FUROSEMIDE 40 MG: 10 INJECTION, SOLUTION INTRAMUSCULAR; INTRAVENOUS at 07:20

## 2017-06-21 RX ADMIN — MANNITOL: 20 INJECTION, SOLUTION INTRAVENOUS at 07:20

## 2017-06-21 RX ADMIN — DOCUSATE SODIUM 100 MG: 100 CAPSULE, LIQUID FILLED ORAL at 13:05

## 2017-06-21 RX ADMIN — ARIPIPRAZOLE 5 MG: 10 TABLET ORAL at 13:04

## 2017-06-21 RX ADMIN — FENTANYL CITRATE 100 MCG: 50 INJECTION, SOLUTION INTRAMUSCULAR; INTRAVENOUS at 07:02

## 2017-06-21 RX ADMIN — INSULIN LISPRO 6 UNITS: 100 INJECTION, SOLUTION INTRAVENOUS; SUBCUTANEOUS at 10:55

## 2017-06-21 RX ADMIN — LIDOCAINE HYDROCHLORIDE 50 MG: 10 INJECTION, SOLUTION EPIDURAL; INFILTRATION; INTRACAUDAL; PERINEURAL at 07:02

## 2017-06-21 NOTE — ANESTHESIA PROCEDURE NOTES
Airway  Urgency: elective    Airway not difficult    General Information and Staff    Patient location during procedure: OR  CRNA: DEACON RODARTE    Indications and Patient Condition  Indications for airway management: airway protection (RSI)    Preoxygenated: yes  MILS not maintained throughout  Mask difficulty assessment: 0 - not attempted    Final Airway Details  Final airway type: endotracheal airway      Successful airway: ETT  Cuffed: yes   Successful intubation technique: video laryngoscopy  Endotracheal tube insertion site: oral  Blade type: glidescope.  Blade size: #3  ETT size: 7.0 mm  Cormack-Lehane Classification: grade I - full view of glottis  Placement verified by: chest auscultation and capnometry   Measured from: lips  ETT to lips (cm): 20  Number of attempts at approach: 1    Additional Comments  Elective glidescope intubation    Negative epigastric sounds, Breath sound equal bilaterally with symmetric chest rise and fall

## 2017-06-21 NOTE — ANESTHESIA POSTPROCEDURE EVALUATION
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  Pain score: 0  Pain management: adequate  Airway patency: patent  Anesthetic complications: No anesthetic complications  PONV Status: none  Cardiovascular status: hemodynamically stable and acceptable  Respiratory status: nonlabored ventilation, acceptable and nasal cannula  Hydration status: acceptable

## 2017-06-21 NOTE — ANESTHESIA PREPROCEDURE EVALUATION
Anesthesia Evaluation     Patient summary reviewed and Nursing notes reviewed   history of anesthetic complications: PONV  NPO Solid Status: > 8 hours  NPO Liquid Status: > 8 hours     Airway   Mallampati: III  TM distance: >3 FB  Neck ROM: full  Dental      Pulmonary - negative pulmonary ROS and normal exam   Cardiovascular - normal exam    (+) hypertension,       Neuro/Psych  (+) headaches, psychiatric history Anxiety and Depression,    GI/Hepatic/Renal/Endo    (+)  GERD, diabetes mellitus,     Musculoskeletal     Abdominal    Substance History      OB/GYN          Other   (+) arthritis   history of cancer active                                    Anesthesia Plan    ASA 3     general   (Rsi)  intravenous induction   Anesthetic plan and risks discussed with patient.    Plan discussed with CRNA.

## 2017-06-21 NOTE — ANESTHESIA PROCEDURE NOTES
Arterial Line    Patient location during procedure: OR   Line placed for hemodynamic monitoring and ABGs/Labs/ISTAT.  Performed By   Anesthesiologist: JAI MAIER  Preanesthetic Checklist  Completed: patient identified, site marked, surgical consent, pre-op evaluation, timeout performed, IV checked, risks and benefits discussed and monitors and equipment checked  Arterial Line Prep   Sterile Tech: cap, gloves, gown, mask and sterile barriers  Prep: ChloraPrep  Patient monitoring: blood pressure monitoring, continuous pulse oximetry and EKG  Arterial Line Procedure   Laterality:right  Location:  radial artery  Catheter size: 20 G   Guidance: ultrasound guided  PROCEDURE NOTE/ULTRASOUND INTERPRETATION.  Using ultrasound guidance the potential vascular sites for insertion of the catheter were visualized to determine the patency of the vessel to be used for vascular access.  After selecting the appropriate site for insertion, the needle was visualized under ultrasound being inserted into the radial artery, followed by ultrasound confirmation of wire and catheter placement. There were no abnormalities seen on ultrasound; an image was taken; and the patient tolerated the procedure with no complications.   Number of attempts: 2  Successful placement: yes          Post Assessment   Dressing Type: occlusive dressing applied.   Complications no  Circ/Move/Sens Assessment: unchanged.

## 2017-06-22 ENCOUNTER — APPOINTMENT (OUTPATIENT)
Dept: CT IMAGING | Facility: HOSPITAL | Age: 42
End: 2017-06-22

## 2017-06-22 LAB
ANION GAP SERPL CALCULATED.3IONS-SCNC: 12 MMOL/L (ref 3–11)
BUN BLD-MCNC: 14 MG/DL (ref 9–23)
BUN/CREAT SERPL: 15.6 (ref 7–25)
CALCIUM SPEC-SCNC: 9.2 MG/DL (ref 8.7–10.4)
CHLORIDE SERPL-SCNC: 103 MMOL/L (ref 99–109)
CO2 SERPL-SCNC: 20 MMOL/L (ref 20–31)
CREAT BLD-MCNC: 0.9 MG/DL (ref 0.6–1.3)
DEPRECATED RDW RBC AUTO: 44.9 FL (ref 37–54)
ERYTHROCYTE [DISTWIDTH] IN BLOOD BY AUTOMATED COUNT: 14.1 % (ref 11.3–14.5)
GFR SERPL CREATININE-BSD FRML MDRD: 68 ML/MIN/1.73
GLUCOSE BLD-MCNC: 264 MG/DL (ref 70–100)
GLUCOSE BLDC GLUCOMTR-MCNC: 238 MG/DL (ref 70–130)
GLUCOSE BLDC GLUCOMTR-MCNC: 242 MG/DL (ref 70–130)
GLUCOSE BLDC GLUCOMTR-MCNC: 255 MG/DL (ref 70–130)
GLUCOSE BLDC GLUCOMTR-MCNC: 286 MG/DL (ref 70–130)
HCT VFR BLD AUTO: 42.1 % (ref 34.5–44)
HGB BLD-MCNC: 14 G/DL (ref 11.5–15.5)
MCH RBC QN AUTO: 29 PG (ref 27–31)
MCHC RBC AUTO-ENTMCNC: 33.3 G/DL (ref 32–36)
MCV RBC AUTO: 87.3 FL (ref 80–99)
PLATELET # BLD AUTO: 242 10*3/MM3 (ref 150–450)
PMV BLD AUTO: 9.5 FL (ref 6–12)
POTASSIUM BLD-SCNC: 4 MMOL/L (ref 3.5–5.5)
RBC # BLD AUTO: 4.82 10*6/MM3 (ref 3.89–5.14)
SODIUM BLD-SCNC: 135 MMOL/L (ref 132–146)
WBC NRBC COR # BLD: 14.9 10*3/MM3 (ref 3.5–10.8)

## 2017-06-22 PROCEDURE — 70450 CT HEAD/BRAIN W/O DYE: CPT

## 2017-06-22 PROCEDURE — 82962 GLUCOSE BLOOD TEST: CPT

## 2017-06-22 PROCEDURE — 99024 POSTOP FOLLOW-UP VISIT: CPT | Performed by: PHYSICIAN ASSISTANT

## 2017-06-22 PROCEDURE — 25010000002 DEXAMETHASONE PER 1 MG: Performed by: NEUROLOGICAL SURGERY

## 2017-06-22 PROCEDURE — 85027 COMPLETE CBC AUTOMATED: CPT | Performed by: NEUROLOGICAL SURGERY

## 2017-06-22 PROCEDURE — 97162 PT EVAL MOD COMPLEX 30 MIN: CPT

## 2017-06-22 PROCEDURE — 80048 BASIC METABOLIC PNL TOTAL CA: CPT | Performed by: NEUROLOGICAL SURGERY

## 2017-06-22 PROCEDURE — 25010000003 CEFAZOLIN IN DEXTROSE 2-4 GM/100ML-% SOLUTION: Performed by: NEUROLOGICAL SURGERY

## 2017-06-22 PROCEDURE — 97110 THERAPEUTIC EXERCISES: CPT

## 2017-06-22 RX ADMIN — INSULIN LISPRO 4 UNITS: 100 INJECTION, SOLUTION INTRAVENOUS; SUBCUTANEOUS at 08:14

## 2017-06-22 RX ADMIN — CLONAZEPAM 1 MG: 1 TABLET ORAL at 08:13

## 2017-06-22 RX ADMIN — LINAGLIPTIN 5 MG: 5 TABLET, FILM COATED ORAL at 08:13

## 2017-06-22 RX ADMIN — GABAPENTIN 800 MG: 400 CAPSULE ORAL at 05:56

## 2017-06-22 RX ADMIN — BISOPROLOL FUMARATE AND HYDROCHLOROTHIAZIDE 1 TABLET: 6.25; 2.5 TABLET ORAL at 08:13

## 2017-06-22 RX ADMIN — DEXAMETHASONE SODIUM PHOSPHATE 4 MG: 4 INJECTION, SOLUTION INTRA-ARTICULAR; INTRALESIONAL; INTRAMUSCULAR; INTRAVENOUS; SOFT TISSUE at 11:50

## 2017-06-22 RX ADMIN — OXYCODONE HYDROCHLORIDE AND ACETAMINOPHEN 2 TABLET: 7.5; 325 TABLET ORAL at 04:19

## 2017-06-22 RX ADMIN — CLONAZEPAM 1 MG: 1 TABLET ORAL at 17:27

## 2017-06-22 RX ADMIN — CEFAZOLIN SODIUM 2 G: 2 INJECTION, SOLUTION INTRAVENOUS at 15:50

## 2017-06-22 RX ADMIN — GABAPENTIN 800 MG: 400 CAPSULE ORAL at 13:58

## 2017-06-22 RX ADMIN — INSULIN LISPRO 6 UNITS: 100 INJECTION, SOLUTION INTRAVENOUS; SUBCUTANEOUS at 17:27

## 2017-06-22 RX ADMIN — DEXAMETHASONE SODIUM PHOSPHATE 4 MG: 4 INJECTION, SOLUTION INTRA-ARTICULAR; INTRALESIONAL; INTRAMUSCULAR; INTRAVENOUS; SOFT TISSUE at 17:27

## 2017-06-22 RX ADMIN — SERTRALINE 150 MG: 100 TABLET, FILM COATED ORAL at 08:13

## 2017-06-22 RX ADMIN — CEFAZOLIN SODIUM 2 G: 2 INJECTION, SOLUTION INTRAVENOUS at 08:14

## 2017-06-22 RX ADMIN — DEXAMETHASONE SODIUM PHOSPHATE 4 MG: 4 INJECTION, SOLUTION INTRA-ARTICULAR; INTRALESIONAL; INTRAMUSCULAR; INTRAVENOUS; SOFT TISSUE at 00:01

## 2017-06-22 RX ADMIN — ARIPIPRAZOLE 5 MG: 10 TABLET ORAL at 08:13

## 2017-06-22 RX ADMIN — INSULIN LISPRO 6 UNITS: 100 INJECTION, SOLUTION INTRAVENOUS; SUBCUTANEOUS at 22:09

## 2017-06-22 RX ADMIN — DEXAMETHASONE SODIUM PHOSPHATE 4 MG: 4 INJECTION, SOLUTION INTRA-ARTICULAR; INTRALESIONAL; INTRAMUSCULAR; INTRAVENOUS; SOFT TISSUE at 05:54

## 2017-06-22 RX ADMIN — GABAPENTIN 800 MG: 400 CAPSULE ORAL at 20:04

## 2017-06-22 RX ADMIN — INSULIN LISPRO 4 UNITS: 100 INJECTION, SOLUTION INTRAVENOUS; SUBCUTANEOUS at 11:50

## 2017-06-22 RX ADMIN — CEFAZOLIN SODIUM 2 G: 2 INJECTION, SOLUTION INTRAVENOUS at 00:01

## 2017-06-23 ENCOUNTER — APPOINTMENT (OUTPATIENT)
Dept: CT IMAGING | Facility: HOSPITAL | Age: 42
End: 2017-06-23

## 2017-06-23 ENCOUNTER — APPOINTMENT (OUTPATIENT)
Dept: MRI IMAGING | Facility: HOSPITAL | Age: 42
End: 2017-06-23

## 2017-06-23 LAB
BACTERIA SPEC AEROBE CULT: NORMAL
GLUCOSE BLDC GLUCOMTR-MCNC: 206 MG/DL (ref 70–130)
GLUCOSE BLDC GLUCOMTR-MCNC: 252 MG/DL (ref 70–130)
GLUCOSE BLDC GLUCOMTR-MCNC: 310 MG/DL (ref 70–130)
GLUCOSE BLDC GLUCOMTR-MCNC: 331 MG/DL (ref 70–130)

## 2017-06-23 PROCEDURE — 82962 GLUCOSE BLOOD TEST: CPT

## 2017-06-23 PROCEDURE — 25010000002 DEXAMETHASONE PER 1 MG: Performed by: NEUROLOGICAL SURGERY

## 2017-06-23 PROCEDURE — 70450 CT HEAD/BRAIN W/O DYE: CPT

## 2017-06-23 PROCEDURE — 63710000001 INSULIN LISPRO (HUMAN) PER 5 UNITS: Performed by: NEUROLOGICAL SURGERY

## 2017-06-23 PROCEDURE — 97110 THERAPEUTIC EXERCISES: CPT

## 2017-06-23 PROCEDURE — 70553 MRI BRAIN STEM W/O & W/DYE: CPT

## 2017-06-23 PROCEDURE — 0 GADOBENATE DIMEGLUMINE 529 MG/ML SOLUTION: Performed by: NEUROLOGICAL SURGERY

## 2017-06-23 PROCEDURE — A9577 INJ MULTIHANCE: HCPCS | Performed by: NEUROLOGICAL SURGERY

## 2017-06-23 PROCEDURE — 25810000003 POTASSIUM CHLORIDE PER 2 MEQ: Performed by: NEUROLOGICAL SURGERY

## 2017-06-23 RX ADMIN — DOCUSATE SODIUM 100 MG: 100 CAPSULE, LIQUID FILLED ORAL at 09:30

## 2017-06-23 RX ADMIN — BISOPROLOL FUMARATE AND HYDROCHLOROTHIAZIDE 1 TABLET: 6.25; 2.5 TABLET ORAL at 09:31

## 2017-06-23 RX ADMIN — DEXAMETHASONE SODIUM PHOSPHATE 4 MG: 4 INJECTION, SOLUTION INTRA-ARTICULAR; INTRALESIONAL; INTRAMUSCULAR; INTRAVENOUS; SOFT TISSUE at 18:02

## 2017-06-23 RX ADMIN — DEXAMETHASONE SODIUM PHOSPHATE 4 MG: 4 INJECTION, SOLUTION INTRA-ARTICULAR; INTRALESIONAL; INTRAMUSCULAR; INTRAVENOUS; SOFT TISSUE at 00:25

## 2017-06-23 RX ADMIN — GABAPENTIN 800 MG: 400 CAPSULE ORAL at 14:31

## 2017-06-23 RX ADMIN — DOCUSATE SODIUM 100 MG: 100 CAPSULE, LIQUID FILLED ORAL at 18:02

## 2017-06-23 RX ADMIN — ARIPIPRAZOLE 5 MG: 10 TABLET ORAL at 09:31

## 2017-06-23 RX ADMIN — INSULIN LISPRO 6 UNITS: 100 INJECTION, SOLUTION INTRAVENOUS; SUBCUTANEOUS at 12:47

## 2017-06-23 RX ADMIN — LINAGLIPTIN 5 MG: 5 TABLET, FILM COATED ORAL at 09:32

## 2017-06-23 RX ADMIN — GABAPENTIN 800 MG: 400 CAPSULE ORAL at 06:06

## 2017-06-23 RX ADMIN — OXYCODONE HYDROCHLORIDE AND ACETAMINOPHEN 1 TABLET: 7.5; 325 TABLET ORAL at 09:31

## 2017-06-23 RX ADMIN — SERTRALINE 150 MG: 100 TABLET, FILM COATED ORAL at 09:30

## 2017-06-23 RX ADMIN — DEXAMETHASONE SODIUM PHOSPHATE 4 MG: 4 INJECTION, SOLUTION INTRA-ARTICULAR; INTRALESIONAL; INTRAMUSCULAR; INTRAVENOUS; SOFT TISSUE at 12:46

## 2017-06-23 RX ADMIN — GABAPENTIN 800 MG: 400 CAPSULE ORAL at 21:41

## 2017-06-23 RX ADMIN — INSULIN LISPRO 7 UNITS: 100 INJECTION, SOLUTION INTRAVENOUS; SUBCUTANEOUS at 21:41

## 2017-06-23 RX ADMIN — GADOBENATE DIMEGLUMINE 20 ML: 529 INJECTION, SOLUTION INTRAVENOUS at 09:30

## 2017-06-23 RX ADMIN — CLONAZEPAM 1 MG: 1 TABLET ORAL at 09:30

## 2017-06-23 RX ADMIN — CLONAZEPAM 1 MG: 1 TABLET ORAL at 18:02

## 2017-06-23 RX ADMIN — INSULIN LISPRO 7 UNITS: 100 INJECTION, SOLUTION INTRAVENOUS; SUBCUTANEOUS at 18:04

## 2017-06-23 RX ADMIN — INSULIN LISPRO 4 UNITS: 100 INJECTION, SOLUTION INTRAVENOUS; SUBCUTANEOUS at 09:32

## 2017-06-23 RX ADMIN — DEXAMETHASONE SODIUM PHOSPHATE 4 MG: 4 INJECTION, SOLUTION INTRA-ARTICULAR; INTRALESIONAL; INTRAMUSCULAR; INTRAVENOUS; SOFT TISSUE at 06:07

## 2017-06-23 RX ADMIN — POTASSIUM CHLORIDE AND SODIUM CHLORIDE 100 ML/HR: 450; 150 INJECTION, SOLUTION INTRAVENOUS at 19:13

## 2017-06-24 VITALS
WEIGHT: 235 LBS | TEMPERATURE: 98.3 F | HEART RATE: 77 BPM | SYSTOLIC BLOOD PRESSURE: 128 MMHG | HEIGHT: 62 IN | BODY MASS INDEX: 43.24 KG/M2 | RESPIRATION RATE: 18 BRPM | OXYGEN SATURATION: 93 % | DIASTOLIC BLOOD PRESSURE: 70 MMHG

## 2017-06-24 LAB — GLUCOSE BLDC GLUCOMTR-MCNC: 305 MG/DL (ref 70–130)

## 2017-06-24 PROCEDURE — 25010000002 DEXAMETHASONE PER 1 MG: Performed by: NEUROLOGICAL SURGERY

## 2017-06-24 PROCEDURE — 99024 POSTOP FOLLOW-UP VISIT: CPT | Performed by: NEUROLOGICAL SURGERY

## 2017-06-24 PROCEDURE — 82962 GLUCOSE BLOOD TEST: CPT

## 2017-06-24 RX ORDER — DEXAMETHASONE 4 MG/1
4 TABLET ORAL TAKE AS DIRECTED
Qty: 30 TABLET | Refills: 0 | Status: SHIPPED | OUTPATIENT
Start: 2017-06-24 | End: 2017-09-19

## 2017-06-24 RX ORDER — OXYCODONE AND ACETAMINOPHEN 7.5; 325 MG/1; MG/1
1 TABLET ORAL EVERY 4 HOURS PRN
Qty: 45 TABLET | Refills: 0 | Status: SHIPPED | OUTPATIENT
Start: 2017-06-24 | End: 2017-07-01

## 2017-06-24 RX ORDER — OXYCODONE HYDROCHLORIDE AND ACETAMINOPHEN 5; 325 MG/1; MG/1
1 TABLET ORAL EVERY 6 HOURS PRN
Qty: 60 TABLET | Status: SHIPPED | OUTPATIENT
Start: 2017-06-24 | End: 2018-01-07

## 2017-06-24 RX ADMIN — OXYCODONE HYDROCHLORIDE AND ACETAMINOPHEN 1 TABLET: 7.5; 325 TABLET ORAL at 10:07

## 2017-06-24 RX ADMIN — DEXAMETHASONE SODIUM PHOSPHATE 4 MG: 4 INJECTION, SOLUTION INTRA-ARTICULAR; INTRALESIONAL; INTRAMUSCULAR; INTRAVENOUS; SOFT TISSUE at 00:49

## 2017-06-24 RX ADMIN — ARIPIPRAZOLE 5 MG: 10 TABLET ORAL at 08:24

## 2017-06-24 RX ADMIN — CLONAZEPAM 1 MG: 1 TABLET ORAL at 08:24

## 2017-06-24 RX ADMIN — SERTRALINE 150 MG: 100 TABLET, FILM COATED ORAL at 08:24

## 2017-06-24 RX ADMIN — INSULIN LISPRO 7 UNITS: 100 INJECTION, SOLUTION INTRAVENOUS; SUBCUTANEOUS at 08:26

## 2017-06-24 RX ADMIN — BISOPROLOL FUMARATE AND HYDROCHLOROTHIAZIDE 1 TABLET: 6.25; 2.5 TABLET ORAL at 08:24

## 2017-06-24 RX ADMIN — DOCUSATE SODIUM 100 MG: 100 CAPSULE, LIQUID FILLED ORAL at 08:24

## 2017-06-24 RX ADMIN — GABAPENTIN 800 MG: 400 CAPSULE ORAL at 06:03

## 2017-06-24 RX ADMIN — LINAGLIPTIN 5 MG: 5 TABLET, FILM COATED ORAL at 08:24

## 2017-07-12 DIAGNOSIS — C71.9 GLIOMA (HCC): ICD-10-CM

## 2017-07-12 RX ORDER — FLUCONAZOLE 200 MG/1
TABLET ORAL
Refills: 1 | COMMUNITY
Start: 2017-06-29 | End: 2017-09-19

## 2017-07-12 RX ORDER — BLOOD SUGAR DIAGNOSTIC
STRIP MISCELLANEOUS
Refills: 3 | COMMUNITY
Start: 2017-07-01

## 2017-07-12 RX ORDER — SODIUM CHLORIDE 9 MG/ML
250 INJECTION, SOLUTION INTRAVENOUS ONCE
Status: CANCELLED | OUTPATIENT
Start: 2017-08-16

## 2017-07-13 ENCOUNTER — TELEPHONE (OUTPATIENT)
Dept: NEUROSURGERY | Facility: CLINIC | Age: 42
End: 2017-07-13

## 2017-07-13 ENCOUNTER — OFFICE VISIT (OUTPATIENT)
Dept: NEUROSURGERY | Facility: CLINIC | Age: 42
End: 2017-07-13

## 2017-07-13 VITALS
HEIGHT: 62 IN | WEIGHT: 238 LBS | BODY MASS INDEX: 43.79 KG/M2 | OXYGEN SATURATION: 95 % | DIASTOLIC BLOOD PRESSURE: 81 MMHG | RESPIRATION RATE: 16 BRPM | TEMPERATURE: 98.4 F | HEART RATE: 89 BPM | SYSTOLIC BLOOD PRESSURE: 123 MMHG

## 2017-07-13 DIAGNOSIS — C71.9 GLIOMA (HCC): Primary | ICD-10-CM

## 2017-07-13 DIAGNOSIS — C71.9 ASTROCYTOMA (HCC): ICD-10-CM

## 2017-07-13 PROCEDURE — 99024 POSTOP FOLLOW-UP VISIT: CPT | Performed by: NEUROLOGICAL SURGERY

## 2017-07-13 RX ORDER — LANCETS
EACH MISCELLANEOUS
COMMUNITY
Start: 2017-07-09

## 2017-07-13 NOTE — PROGRESS NOTES
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  Her post operative MRI showed excellent resection.  She had been on Temodar and had failed therapy.         Past Medical History:   Diagnosis Date   • Anxiety and depression    • Arthritis     RIGHT FOOT \"DUE TO FOUR STRESS FRACTURES\"    • Brain tumor     SURGERY, CHEMO AND RADIATON    • Diabetes mellitus     DX'D TYPE II NIDDM APPROX 6 WEEKS AGO, \"CAUSED BY CHEMO\" CHECKS BS -150 IN AM -275 PM    • Eczema    • Frequent headaches    • GERD (gastroesophageal reflux disease)    • History of shingles     MULTIPLE OUTBREAKS--NO OPEN AREAS AT THIS TIME \"SHOW UP JUST ABOVE BUTTOCKS\"    • Hypertension     CONTROLLED WITH MEDS PER PT    • IBS (irritable bowel syndrome)    • Nerve damage     from shingles   • PONV (postoperative nausea and vomiting)               Past Surgical History:   Procedure Laterality Date   • BREAST SURGERY      REDUCTION    • CHOLECYSTECTOMY     • CRANIOTOMY  [id number]   • CRANIOTOMY FOR TUMOR Right 6/21/2017    Procedure: CRANIOTOMY FOR TUMOR STEREOTACTIC WITH STEALTH; 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     Social History Main Topics   • Smoking status: Never Smoker   • Smokeless tobacco: Never Used   • Alcohol use No   • Drug use: No   • Sexual activity: Defer     Other Topics Concern   • Not on file     Social History Narrative            "   Allergies   Allergen Reactions   • Morphine And Related Hallucinations   • Diamox [Acetazolamide] Rash              Current Outpatient Prescriptions:   •  ACCU-CHEK JACOB PLUS test strip, USE BID, Disp: , Rfl: 3  •  ARIPiprazole (ABILIFY) 5 MG tablet, Take 5 mg by mouth Daily., Disp: , Rfl:   •  Canagliflozin (INVOKANA) 100 MG tablet, Take 1 tablet by mouth Daily., Disp: , Rfl:   •  clonazePAM (KlonoPIN) 1 MG tablet, Take 1 mg by mouth 2 (Two) Times a Day As Needed., Disp: , Rfl:   •  dexamethasone (DECADRON) 4 MG tablet, Take 1 tablet by mouth Take As Directed., Disp: 30 tablet, Rfl: 0  •  fluconazole (DIFLUCAN) 200 MG tablet, TK 1 T PO QD FOR 10 DAYS, Disp: , Rfl: 1  •  gabapentin (NEURONTIN) 800 MG tablet, Take 800 mg by mouth 3 (Three) Times a Day., Disp: , Rfl:   •  lisinopril-hydrochlorothiazide (PRINZIDE,ZESTORETIC) 20-12.5 MG per tablet, Take 1 tablet by mouth Daily., Disp: , Rfl:   •  nystatin (MYCOSTATIN) 189478 UNIT/ML suspension, SHAKE LQ AND SWISH AND SWALLOW 5 ML ORALLY QID FOR 10 DAYS, Disp: , Rfl: 1  •  oxyCODONE-acetaminophen (PERCOCET) 5-325 MG per tablet, Take 1 tablet by mouth Every 6 (Six) Hours As Needed for Moderate Pain (4-6)., Disp: 60 tablet, Rfl: o  •  sertraline (ZOLOFT) 100 MG tablet, Take 150 mg by mouth Daily., Disp: , Rfl:   •  SITagliptin (JANUVIA) 100 MG tablet, Take 100 mg by mouth Daily., Disp: , Rfl:   •  valACYclovir (VALTREX) 500 MG tablet, Take 1,000 mg by mouth 2 (Two) Times a Day., Disp: , Rfl:   •  zolpidem (AMBIEN) 5 MG tablet, Take 5 mg by mouth Every Night., Disp: , Rfl:   No current facility-administered medications for this visit.     Facility-Administered Medications Ordered in Other Visits:   •  mupirocin (BACTROBAN) 2 % nasal ointment, , Nasal, BID, Ирина L Interiano, PA-C         Review of Systems   Respiratory: Positive for shortness of breath.    Neurological: Positive for headaches.   All other systems reviewed and are negative.            There were no vitals  filed for this visit.            EXAMINATION: She is alert and oriented.  No papilledema.  No weakness sensory loss or reflex asymmetry.            MEDICAL DECISION MAKING: The report of the genetic markers are for glioma have not returned as of yet.  However they should be forthcoming shortly.  Obviously she did not respond as well as we would anticipate with Temodar.  Avastin is the treatment of choice at this time.  There may be other outcome lading agents that can be used in conjunction with Avastin.           ASSESSMENT/DISPOSITION: She is seen in medical oncology next week.  I will see that she gets a port placed for Avastin administration.  She will see me in 2 months with MRI.  I have talk with pathology today and assured that these report should be forthcoming within the next few days.  I would assume they would be in the EMR.              I APPRECIATE THE OPPORTUNITY OF THIS REFERRAL. PLEASE CALL IF ANY       QUESTIONS [phone]

## 2017-07-15 LAB
CYTO UR: NORMAL
DX PRELIMINARY: NORMAL
LAB AP CASE REPORT: NORMAL
LAB AP CLINICAL INFORMATION: NORMAL
LAB AP DIAGNOSIS COMMENT: NORMAL
LAB AP SPECIAL STAINS: NORMAL
Lab: NORMAL
Lab: NORMAL
PATH REPORT.FINAL DX SPEC: NORMAL
PATH REPORT.GROSS SPEC: NORMAL

## 2017-07-19 ENCOUNTER — INFUSION (OUTPATIENT)
Dept: ONCOLOGY | Facility: HOSPITAL | Age: 42
End: 2017-07-19

## 2017-07-19 DIAGNOSIS — C71.9 GLIOMA (HCC): ICD-10-CM

## 2017-07-20 DIAGNOSIS — C71.9 GLIOMA (HCC): ICD-10-CM

## 2017-07-20 RX ORDER — SODIUM CHLORIDE 9 MG/ML
250 INJECTION, SOLUTION INTRAVENOUS ONCE
Status: CANCELLED | OUTPATIENT
Start: 2017-08-30

## 2017-07-25 ENCOUNTER — APPOINTMENT (OUTPATIENT)
Dept: PREADMISSION TESTING | Facility: HOSPITAL | Age: 42
End: 2017-07-25

## 2017-07-25 LAB
ANION GAP SERPL CALCULATED.3IONS-SCNC: 7 MMOL/L (ref 3–11)
BUN BLD-MCNC: 8 MG/DL (ref 9–23)
BUN/CREAT SERPL: 13.3 (ref 7–25)
CALCIUM SPEC-SCNC: 9.2 MG/DL (ref 8.7–10.4)
CHLORIDE SERPL-SCNC: 104 MMOL/L (ref 99–109)
CO2 SERPL-SCNC: 25 MMOL/L (ref 20–31)
CREAT BLD-MCNC: 0.6 MG/DL (ref 0.6–1.3)
DEPRECATED RDW RBC AUTO: 42.7 FL (ref 37–54)
ERYTHROCYTE [DISTWIDTH] IN BLOOD BY AUTOMATED COUNT: 13.6 % (ref 11.3–14.5)
GFR SERPL CREATININE-BSD FRML MDRD: 109 ML/MIN/1.73
GLUCOSE BLD-MCNC: 160 MG/DL (ref 70–100)
HCT VFR BLD AUTO: 43 % (ref 34.5–44)
HGB BLD-MCNC: 14.4 G/DL (ref 11.5–15.5)
MCH RBC QN AUTO: 29.1 PG (ref 27–31)
MCHC RBC AUTO-ENTMCNC: 33.5 G/DL (ref 32–36)
MCV RBC AUTO: 86.9 FL (ref 80–99)
PLATELET # BLD AUTO: 286 10*3/MM3 (ref 150–450)
PMV BLD AUTO: 9.9 FL (ref 6–12)
POTASSIUM BLD-SCNC: 4.2 MMOL/L (ref 3.5–5.5)
RBC # BLD AUTO: 4.95 10*6/MM3 (ref 3.89–5.14)
SODIUM BLD-SCNC: 136 MMOL/L (ref 132–146)
WBC NRBC COR # BLD: 8.88 10*3/MM3 (ref 3.5–10.8)

## 2017-07-25 PROCEDURE — 36415 COLL VENOUS BLD VENIPUNCTURE: CPT

## 2017-07-25 PROCEDURE — 85027 COMPLETE CBC AUTOMATED: CPT | Performed by: SURGERY

## 2017-07-25 PROCEDURE — 80048 BASIC METABOLIC PNL TOTAL CA: CPT | Performed by: SURGERY

## 2017-07-26 ENCOUNTER — TRANSCRIBE ORDERS (OUTPATIENT)
Dept: ADMINISTRATIVE | Facility: HOSPITAL | Age: 42
End: 2017-07-26

## 2017-07-26 DIAGNOSIS — C71.9 MALIGNANT NEOPLASM OF BRAIN, UNSPECIFIED SITE: Primary | ICD-10-CM

## 2017-07-27 ENCOUNTER — HOSPITAL ENCOUNTER (OUTPATIENT)
Dept: GENERAL RADIOLOGY | Facility: HOSPITAL | Age: 42
Discharge: HOME | End: 2017-07-27
Attending: SURGERY

## 2017-07-27 DIAGNOSIS — C71.9 MALIGNANT NEOPLASM OF BRAIN, UNSPECIFIED SITE: ICD-10-CM

## 2017-07-27 PROCEDURE — 71010 HC CHEST PA OR AP: CPT

## 2017-07-28 DIAGNOSIS — C71.9 GLIOMA (HCC): Primary | ICD-10-CM

## 2017-08-02 ENCOUNTER — APPOINTMENT (OUTPATIENT)
Dept: ONCOLOGY | Facility: HOSPITAL | Age: 42
End: 2017-08-02

## 2017-08-16 ENCOUNTER — DOCUMENTATION (OUTPATIENT)
Dept: ONCOLOGY | Facility: HOSPITAL | Age: 42
End: 2017-08-16

## 2017-08-16 ENCOUNTER — INFUSION (OUTPATIENT)
Dept: ONCOLOGY | Facility: HOSPITAL | Age: 42
End: 2017-08-16

## 2017-08-16 VITALS
HEART RATE: 86 BPM | DIASTOLIC BLOOD PRESSURE: 69 MMHG | BODY MASS INDEX: 44.39 KG/M2 | OXYGEN SATURATION: 99 % | WEIGHT: 242.7 LBS | SYSTOLIC BLOOD PRESSURE: 123 MMHG | TEMPERATURE: 97.3 F | RESPIRATION RATE: 18 BRPM

## 2017-08-16 DIAGNOSIS — C71.9 GLIOMA (HCC): Primary | ICD-10-CM

## 2017-08-16 LAB
ALBUMIN SERPL-MCNC: 4.2 G/DL (ref 3.5–5)
ALBUMIN/GLOB SERPL: 1.4 G/DL (ref 1.5–2.5)
ALP SERPL-CCNC: 74 U/L (ref 35–104)
ALT SERPL W P-5'-P-CCNC: 45 U/L (ref 10–36)
ANION GAP SERPL CALCULATED.3IONS-SCNC: 9.4 MMOL/L (ref 3.6–11.2)
AST SERPL-CCNC: 38 U/L (ref 10–30)
BASOPHILS # BLD AUTO: 0.03 10*3/MM3 (ref 0–0.3)
BASOPHILS NFR BLD AUTO: 0.3 % (ref 0–2)
BILIRUB SERPL-MCNC: 0.3 MG/DL (ref 0.2–1.8)
BILIRUB UR QL STRIP: NEGATIVE
BUN BLD-MCNC: 11 MG/DL (ref 7–21)
BUN/CREAT SERPL: 14.9 (ref 7–25)
CALCIUM SPEC-SCNC: 9.7 MG/DL (ref 7.7–10)
CHLORIDE SERPL-SCNC: 105 MMOL/L (ref 99–112)
CLARITY UR: CLEAR
CO2 SERPL-SCNC: 23.6 MMOL/L (ref 24.3–31.9)
COLOR UR: YELLOW
CREAT BLD-MCNC: 0.74 MG/DL (ref 0.43–1.29)
DEPRECATED RDW RBC AUTO: 40 FL (ref 37–54)
EOSINOPHIL # BLD AUTO: 0.33 10*3/MM3 (ref 0–0.7)
EOSINOPHIL NFR BLD AUTO: 3.1 % (ref 0–5)
ERYTHROCYTE [DISTWIDTH] IN BLOOD BY AUTOMATED COUNT: 13.2 % (ref 11.5–14.5)
GFR SERPL CREATININE-BSD FRML MDRD: 86 ML/MIN/1.73
GLOBULIN UR ELPH-MCNC: 3 GM/DL
GLUCOSE BLD-MCNC: 173 MG/DL (ref 70–110)
GLUCOSE UR STRIP-MCNC: ABNORMAL MG/DL
HCT VFR BLD AUTO: 40.6 % (ref 37–47)
HGB BLD-MCNC: 14.1 G/DL (ref 12–16)
HGB UR QL STRIP.AUTO: NEGATIVE
IMM GRANULOCYTES # BLD: 0.07 10*3/MM3 (ref 0–0.03)
IMM GRANULOCYTES NFR BLD: 0.7 % (ref 0–0.5)
KETONES UR QL STRIP: NEGATIVE
LEUKOCYTE ESTERASE UR QL STRIP.AUTO: NEGATIVE
LYMPHOCYTES # BLD AUTO: 2.38 10*3/MM3 (ref 1–3)
LYMPHOCYTES NFR BLD AUTO: 22.2 % (ref 21–51)
MCH RBC QN AUTO: 28.7 PG (ref 27–33)
MCHC RBC AUTO-ENTMCNC: 34.7 G/DL (ref 33–37)
MCV RBC AUTO: 82.5 FL (ref 80–94)
MONOCYTES # BLD AUTO: 0.51 10*3/MM3 (ref 0.1–0.9)
MONOCYTES NFR BLD AUTO: 4.8 % (ref 0–10)
NEUTROPHILS # BLD AUTO: 7.38 10*3/MM3 (ref 1.4–6.5)
NEUTROPHILS NFR BLD AUTO: 68.9 % (ref 30–70)
NITRITE UR QL STRIP: NEGATIVE
OSMOLALITY SERPL CALC.SUM OF ELEC: 279.2 MOSM/KG (ref 273–305)
PH UR STRIP.AUTO: <=5 [PH] (ref 5–8)
PLATELET # BLD AUTO: 262 10*3/MM3 (ref 130–400)
PMV BLD AUTO: 10 FL (ref 6–10)
POTASSIUM BLD-SCNC: 4 MMOL/L (ref 3.5–5.3)
PROT SERPL-MCNC: 7.2 G/DL (ref 6–8)
PROT UR QL STRIP: NEGATIVE
RBC # BLD AUTO: 4.92 10*6/MM3 (ref 4.2–5.4)
SODIUM BLD-SCNC: 138 MMOL/L (ref 135–153)
SP GR UR STRIP: 1.02 (ref 1–1.03)
UROBILINOGEN UR QL STRIP: ABNORMAL
WBC NRBC COR # BLD: 10.7 10*3/MM3 (ref 4.5–12.5)

## 2017-08-16 PROCEDURE — 25010000002 HEPARIN FLUSH (PORCINE) 100 UNIT/ML SOLUTION: Performed by: INTERNAL MEDICINE

## 2017-08-16 PROCEDURE — 80053 COMPREHEN METABOLIC PANEL: CPT

## 2017-08-16 PROCEDURE — 96415 CHEMO IV INFUSION ADDL HR: CPT

## 2017-08-16 PROCEDURE — 25010000002 BEVACIZUMAB PER 10 MG: Performed by: INTERNAL MEDICINE

## 2017-08-16 PROCEDURE — 96413 CHEMO IV INFUSION 1 HR: CPT

## 2017-08-16 PROCEDURE — 85025 COMPLETE CBC W/AUTO DIFF WBC: CPT

## 2017-08-16 PROCEDURE — 81003 URINALYSIS AUTO W/O SCOPE: CPT

## 2017-08-16 PROCEDURE — 25010000002 BEVACIZUMAB 100 MG/4ML SOLUTION 4 ML VIAL: Performed by: INTERNAL MEDICINE

## 2017-08-16 RX ORDER — PROCHLORPERAZINE MALEATE 10 MG
10 TABLET ORAL EVERY 6 HOURS PRN
Qty: 30 TABLET | Refills: 5 | Status: SHIPPED | OUTPATIENT
Start: 2017-08-16 | End: 2017-09-19

## 2017-08-16 RX ORDER — SODIUM CHLORIDE 9 MG/ML
250 INJECTION, SOLUTION INTRAVENOUS ONCE
Status: COMPLETED | OUTPATIENT
Start: 2017-08-16 | End: 2017-08-16

## 2017-08-16 RX ORDER — LIDOCAINE AND PRILOCAINE 25; 25 MG/G; MG/G
CREAM TOPICAL
Qty: 30 G | Refills: 5 | Status: SHIPPED | OUTPATIENT
Start: 2017-08-16

## 2017-08-16 RX ORDER — ONDANSETRON HYDROCHLORIDE 8 MG/1
8 TABLET, FILM COATED ORAL EVERY 8 HOURS PRN
Qty: 30 TABLET | Refills: 5 | Status: SHIPPED | OUTPATIENT
Start: 2017-08-16 | End: 2018-01-07

## 2017-08-16 RX ADMIN — BEVACIZUMAB 1100 MG: 400 INJECTION, SOLUTION INTRAVENOUS at 12:15

## 2017-08-16 RX ADMIN — SODIUM CHLORIDE, PRESERVATIVE FREE 500 UNITS: 5 INJECTION INTRAVENOUS at 14:00

## 2017-08-16 RX ADMIN — SODIUM CHLORIDE 250 ML: 9 INJECTION, SOLUTION INTRAVENOUS at 12:15

## 2017-08-16 NOTE — PROGRESS NOTES
SS initiated social service assessment with patient this date.  SS spoke with pt.  Pt appears to be alert and oriented times three.  Pt is a 41 Y/O white female diagnosed with Glioma.  Pt lives at home with spouse Conor and 7 Y/O daughter Tarsha.  Pt states that she has had three brain tumors removed.  Pt states that she is very thankful to be independent after having three surgeries.  Pt utilizes skilled nursing services per [facility].  Pt doesn't utilize any durable medical equipment.  Pt independent with transportation.  Pt doesn't have a living will or POA.  Pt's primary care is TAYLOR Heard.  Pt utilizes [facility] for prescriptions.  Pt has Medicare A/B and [place] [employer] PPO.  SS completed application for [facility] and submitted to agency.  SS will follow and assist as needed.

## 2017-08-30 ENCOUNTER — OFFICE VISIT (OUTPATIENT)
Dept: ONCOLOGY | Facility: CLINIC | Age: 42
End: 2017-08-30

## 2017-08-30 ENCOUNTER — LAB (OUTPATIENT)
Dept: ONCOLOGY | Facility: CLINIC | Age: 42
End: 2017-08-30

## 2017-08-30 ENCOUNTER — INFUSION (OUTPATIENT)
Dept: ONCOLOGY | Facility: HOSPITAL | Age: 42
End: 2017-08-30

## 2017-08-30 VITALS
SYSTOLIC BLOOD PRESSURE: 120 MMHG | DIASTOLIC BLOOD PRESSURE: 82 MMHG | WEIGHT: 244.4 LBS | RESPIRATION RATE: 20 BRPM | HEART RATE: 93 BPM | OXYGEN SATURATION: 99 % | BODY MASS INDEX: 44.7 KG/M2 | TEMPERATURE: 97.9 F

## 2017-08-30 VITALS
BODY MASS INDEX: 44.7 KG/M2 | OXYGEN SATURATION: 99 % | RESPIRATION RATE: 20 BRPM | SYSTOLIC BLOOD PRESSURE: 120 MMHG | WEIGHT: 244.4 LBS | HEART RATE: 93 BPM | DIASTOLIC BLOOD PRESSURE: 82 MMHG | TEMPERATURE: 97.9 F

## 2017-08-30 DIAGNOSIS — C71.9 GLIOMA (HCC): ICD-10-CM

## 2017-08-30 DIAGNOSIS — R11.0 NAUSEA: ICD-10-CM

## 2017-08-30 DIAGNOSIS — C71.9 GLIOMA (HCC): Primary | ICD-10-CM

## 2017-08-30 DIAGNOSIS — C71.9 MALIGNANT NEOPLASM OF BRAIN, UNSPECIFIED SITE: Primary | ICD-10-CM

## 2017-08-30 DIAGNOSIS — E11.8 TYPE 2 DIABETES MELLITUS WITH COMPLICATION, WITHOUT LONG-TERM CURRENT USE OF INSULIN (HCC): ICD-10-CM

## 2017-08-30 LAB
ALBUMIN SERPL-MCNC: 4.2 G/DL (ref 3.5–5)
ALBUMIN/GLOB SERPL: 1.4 G/DL (ref 1.5–2.5)
ALP SERPL-CCNC: 79 U/L (ref 35–104)
ALT SERPL W P-5'-P-CCNC: 41 U/L (ref 10–36)
ANION GAP SERPL CALCULATED.3IONS-SCNC: 10.9 MMOL/L (ref 3.6–11.2)
AST SERPL-CCNC: 37 U/L (ref 10–30)
BACTERIA UR QL AUTO: ABNORMAL /HPF
BASOPHILS # BLD AUTO: 0.04 10*3/MM3 (ref 0–0.3)
BASOPHILS NFR BLD AUTO: 0.4 % (ref 0–2)
BILIRUB SERPL-MCNC: 0.3 MG/DL (ref 0.2–1.8)
BILIRUB UR QL STRIP: NEGATIVE
BUN BLD-MCNC: 11 MG/DL (ref 7–21)
BUN/CREAT SERPL: 15.5 (ref 7–25)
CALCIUM SPEC-SCNC: 9.6 MG/DL (ref 7.7–10)
CHLORIDE SERPL-SCNC: 100 MMOL/L (ref 99–112)
CLARITY UR: ABNORMAL
CO2 SERPL-SCNC: 25.1 MMOL/L (ref 24.3–31.9)
COLOR UR: YELLOW
CREAT BLD-MCNC: 0.71 MG/DL (ref 0.43–1.29)
DEPRECATED RDW RBC AUTO: 39.4 FL (ref 37–54)
EOSINOPHIL # BLD AUTO: 0.37 10*3/MM3 (ref 0–0.7)
EOSINOPHIL NFR BLD AUTO: 3.3 % (ref 0–5)
ERYTHROCYTE [DISTWIDTH] IN BLOOD BY AUTOMATED COUNT: 13.5 % (ref 11.5–14.5)
GFR SERPL CREATININE-BSD FRML MDRD: 90 ML/MIN/1.73
GLOBULIN UR ELPH-MCNC: 3.1 GM/DL
GLUCOSE BLD-MCNC: 225 MG/DL (ref 70–110)
GLUCOSE UR STRIP-MCNC: ABNORMAL MG/DL
HCT VFR BLD AUTO: 41.4 % (ref 37–47)
HGB BLD-MCNC: 14.6 G/DL (ref 12–16)
HGB UR QL STRIP.AUTO: ABNORMAL
HYALINE CASTS UR QL AUTO: ABNORMAL /LPF
IMM GRANULOCYTES # BLD: 0.05 10*3/MM3 (ref 0–0.03)
IMM GRANULOCYTES NFR BLD: 0.4 % (ref 0–0.5)
KETONES UR QL STRIP: ABNORMAL
LEUKOCYTE ESTERASE UR QL STRIP.AUTO: NEGATIVE
LYMPHOCYTES # BLD AUTO: 2.31 10*3/MM3 (ref 1–3)
LYMPHOCYTES NFR BLD AUTO: 20.6 % (ref 21–51)
MCH RBC QN AUTO: 29.1 PG (ref 27–33)
MCHC RBC AUTO-ENTMCNC: 35.3 G/DL (ref 33–37)
MCV RBC AUTO: 82.6 FL (ref 80–94)
MONOCYTES # BLD AUTO: 0.73 10*3/MM3 (ref 0.1–0.9)
MONOCYTES NFR BLD AUTO: 6.5 % (ref 0–10)
NEUTROPHILS # BLD AUTO: 7.73 10*3/MM3 (ref 1.4–6.5)
NEUTROPHILS NFR BLD AUTO: 68.8 % (ref 30–70)
NITRITE UR QL STRIP: NEGATIVE
OSMOLALITY SERPL CALC.SUM OF ELEC: 278.4 MOSM/KG (ref 273–305)
PH UR STRIP.AUTO: 5.5 [PH] (ref 5–8)
PLATELET # BLD AUTO: 279 10*3/MM3 (ref 130–400)
PMV BLD AUTO: 10.1 FL (ref 6–10)
POTASSIUM BLD-SCNC: 4.2 MMOL/L (ref 3.5–5.3)
PROT SERPL-MCNC: 7.3 G/DL (ref 6–8)
PROT UR QL STRIP: NEGATIVE
RBC # BLD AUTO: 5.01 10*6/MM3 (ref 4.2–5.4)
RBC # UR: ABNORMAL /HPF
REF LAB TEST METHOD: ABNORMAL
SODIUM BLD-SCNC: 136 MMOL/L (ref 135–153)
SP GR UR STRIP: 1.02 (ref 1–1.03)
SQUAMOUS #/AREA URNS HPF: ABNORMAL /HPF
UROBILINOGEN UR QL STRIP: ABNORMAL
WBC NRBC COR # BLD: 11.23 10*3/MM3 (ref 4.5–12.5)
WBC UR QL AUTO: ABNORMAL /HPF

## 2017-08-30 PROCEDURE — 25010000002 BEVACIZUMAB PER 10 MG: Performed by: INTERNAL MEDICINE

## 2017-08-30 PROCEDURE — 96413 CHEMO IV INFUSION 1 HR: CPT

## 2017-08-30 PROCEDURE — 81001 URINALYSIS AUTO W/SCOPE: CPT

## 2017-08-30 PROCEDURE — 25010000002 BEVACIZUMAB 100 MG/4ML SOLUTION 4 ML VIAL: Performed by: INTERNAL MEDICINE

## 2017-08-30 PROCEDURE — 25010000002 HEPARIN FLUSH (PORCINE) 100 UNIT/ML SOLUTION: Performed by: INTERNAL MEDICINE

## 2017-08-30 PROCEDURE — 99214 OFFICE O/P EST MOD 30 MIN: CPT | Performed by: NURSE PRACTITIONER

## 2017-08-30 RX ORDER — SODIUM CHLORIDE 9 MG/ML
250 INJECTION, SOLUTION INTRAVENOUS ONCE
Status: COMPLETED | OUTPATIENT
Start: 2017-08-30 | End: 2017-08-30

## 2017-08-30 RX ADMIN — HEPARIN SODIUM (PORCINE) LOCK FLUSH IV SOLN 100 UNIT/ML 500 UNITS: 100 SOLUTION at 13:50

## 2017-08-30 RX ADMIN — SODIUM CHLORIDE 250 ML: 9 INJECTION, SOLUTION INTRAVENOUS at 12:35

## 2017-08-30 RX ADMIN — BEVACIZUMAB 1100 MG: 400 INJECTION, SOLUTION INTRAVENOUS at 12:35

## 2017-08-30 NOTE — PROGRESS NOTES
Name:  Marcelina Alvarez  :  1975  Date:  2017     REFERRING PHYSICIAN  David Gregorio MD    PRIMARY CARE PROVIDER  TAYLOR Heard    REASON FOR FOLLOWUP  1. Glioma      CHIEF COMPLAINT  Headaches improved  Occasional nausea    Dear Dr. Gregorio,    HISTORY OF PRESENT ILLNESS:   I saw Ms. Alvarez in follow up today in our medical oncology clinic. 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 She was initially diagnosed in , and you performed a successful resection. She subsequently underwent localized XRT (but no chemotherapy). She did very well until , when she developed a localized recurrence; and, again, you performed a successful resection (this time without adjuvant XRT). She was again doing well until late , when she started to develop some mild forgetfulness and headaches; and a repeat MRI showed evidence of recurrent disease again. This time, it was felt that repeat surgery was not in her best interest (at least at that time). She underwent Cyberknife treatment in early 2017 and tolerated it well. She was subsequently referred to our clinic to consider chemotherapy (Temodar). At the time of her initial appointment with us (on 2017), she was agreeable to this treatment (patient dose: 320 mg on days 1-5 of a 28-day cycle).    INTERIM HISTORY:  Ms. Alvarez presents to clinic today for follow up of glioma and toxicity check. Since her last visit, she reports having repeat craniotomy for debulking on 17. She reports tolerating the surgery well and is scheduled to follow up with them on 17 for repeat imaging. She also underwent powerport placement and was started on palliative [id number] Avastin. She completed cycle 1 on 17 and overall tolerated this treatment well without any significant SE. She is scheduled for cycle 2 today. She reports feeling well today. She  "reports having occasional nausea controlled with prn antiemetics. She continues to have chronic headaches which are improved following surgery.She reports good appetite and stable weight.      Past Medical History:   Diagnosis Date   • Anxiety and depression    • Arthritis     RIGHT FOOT \"DUE TO FOUR STRESS FRACTURES\"    • Brain tumor     SURGERY, CHEMO AND RADIATON    • Diabetes mellitus     DX'D TYPE II NIDDM APPROX 6 WEEKS AGO, \"CAUSED BY CHEMO\" CHECKS BS -150 IN AM -275 PM    • Eczema    • Frequent headaches    • GERD (gastroesophageal reflux disease)    • History of shingles     MULTIPLE OUTBREAKS--NO OPEN AREAS AT THIS TIME \"SHOW UP JUST ABOVE BUTTOCKS\"    • Hypertension     CONTROLLED WITH MEDS PER PT    • IBS (irritable bowel syndrome)    • Nerve damage     from shingles   • PONV (postoperative nausea and vomiting)        Past Surgical History:   Procedure Laterality Date   • BREAST SURGERY      REDUCTION    • CHOLECYSTECTOMY     • CRANIOTOMY  [id number]   • CRANIOTOMY FOR TUMOR Right 6/21/2017    Procedure: CRANIOTOMY FOR TUMOR STEREOTACTIC WITH STEALTH; planned right frontal lobectomy for astrocytoma progression;  Surgeon: David Gregorio MD;  Location: [facility];  Service:    • ENDOMETRIAL ABLATION         Social History     Social History   • Marital status:      Spouse name: N/A   • Number of children: N/A   • Years of education: N/A     Occupational History   • Not on file.     Social History Main Topics   • Smoking status: Never Smoker   • Smokeless tobacco: Never Used   • Alcohol use No   • Drug use: No   • Sexual activity: Defer     Other Topics Concern   • Not on file     Social History Narrative       Family History   Problem Relation Age of Onset   • Hypertension Father      pulmonary       Allergies   Allergen Reactions   • Morphine And Related Hallucinations   • Diamox [Acetazolamide] Rash       Current Outpatient Prescriptions   Medication Sig Dispense Refill   • " ACCU-CHEK JACOB PLUS test strip USE BID  3   • ACCU-CHEK SOFTCLIX LANCETS lancets      • ARIPiprazole (ABILIFY) 5 MG tablet Take 5 mg by mouth Daily.     • Canagliflozin (INVOKANA) 100 MG tablet Take 1 tablet by mouth Daily.     • clonazePAM (KlonoPIN) 1 MG tablet Take 1 mg by mouth 2 (Two) Times a Day As Needed.     • dexamethasone (DECADRON) 4 MG tablet Take 1 tablet by mouth Take As Directed. 30 tablet 0   • fluconazole (DIFLUCAN) 200 MG tablet TK 1 T PO QD FOR 10 DAYS  1   • gabapentin (NEURONTIN) 800 MG tablet Take 800 mg by mouth 3 (Three) Times a Day.     • lidocaine-prilocaine (EMLA) 2.5-2.5 % cream Apply 1 hour prior to VAD access 30 g 5   • lisinopril-hydrochlorothiazide (PRINZIDE,ZESTORETIC) 20-12.5 MG per tablet Take 1 tablet by mouth Daily.     • nystatin (MYCOSTATIN) 126780 UNIT/ML suspension SHAKE LQ AND SWISH AND SWALLOW 5 ML ORALLY QID FOR 10 DAYS  1   • ondansetron (ZOFRAN) 8 MG tablet Take 1 tablet by mouth Every 8 (Eight) Hours As Needed for Nausea or Vomiting. ODT if covered per insurance 30 tablet 5   • oxyCODONE-acetaminophen (PERCOCET) 5-325 MG per tablet Take 1 tablet by mouth Every 6 (Six) Hours As Needed for Moderate Pain (4-6). 60 tablet o   • prochlorperazine (COMPAZINE) 10 MG tablet Take 1 tablet by mouth Every 6 (Six) Hours As Needed for Nausea or Vomiting. 30 tablet 5   • sertraline (ZOLOFT) 100 MG tablet Take 150 mg by mouth Daily.     • SITagliptin (JANUVIA) 100 MG tablet Take 100 mg by mouth Daily.     • valACYclovir (VALTREX) 500 MG tablet Take 1,000 mg by mouth 2 (Two) Times a Day.     • zolpidem (AMBIEN) 5 MG tablet Take 5 mg by mouth Every Night.       No current facility-administered medications for this visit.      Facility-Administered Medications Ordered in Other Visits   Medication Dose Route Frequency Provider Last Rate Last Dose   • mupirocin (BACTROBAN) 2 % nasal ointment   Nasal BID Ирина Interiano PA-C           REVIEW OF SYSTEMS  CONSTITUTIONAL:  No fever, chills or  night sweats.   EYES:  No blurry vision, diplopia or other vision changes.  ENT:  No hearing loss, nosebleeds or sore throat.  CARDIOVASCULAR:  No palpitations, arrhythmia, syncopal episodes or edema.  PULMONARY:  No hemoptysis, wheezing, chronic cough or shortness of breath.  GASTROINTESTINAL:  No constipation or diarrhea.  No abdominal pain. Mild, and manageable, nausea as per HPI.   GENITOURINARY:  No hematuria, kidney stones or frequent urination. Nimisha-vaginal rash now resolved following her recent diagnosis with, and treatment for, uncontrolled diabetes.  MUSCULOSKELETAL:  No joint or back pains.  INTEGUMENTARY: No rashes or pruritus.  ENDOCRINE:  No excessive thirst or hot flashes.  HEMATOLOGIC:  No history of free bleeding, spontaneous bleeding or clotting.  IMMUNOLOGIC:  No allergies or frequent infections.  NEUROLOGIC: No numbness, tingling, seizures or weakness. Headaches improved following recent craniotomy.   PSYCHIATRIC:  No anxiety or depression.    PHYSICAL EXAMINATION    /82  Pulse 93  Temp 97.9 °F (36.6 °C) (Oral)   Resp 20  Wt 244 lb 6.4 oz (111 kg)  SpO2 99%  BMI 44.7 kg/m2  GENERAL:  A well-developed, well-nourished, obese, white female in no acute distress.  HEENT:  Pupils equally round and reactive to light.  Extraocular muscles intact.  CARDIOVASCULAR:  Regular rate and rhythm.  No murmurs, gallops or rubs.  LUNGS:  Clear to auscultation bilaterally.  ABDOMEN:  Soft, nontender, nondistended with positive bowel sounds.  : Deferred.  EXTREMITIES:  No clubbing, cyanosis or edema bilaterally.  SKIN:  No rashes or petechiae.  NEURO:  Cranial nerves grossly intact.  No focal deficits.  PSYCH:  Alert and oriented x3.    LABORATORY    Lab Results   Component Value Date    WBC 11.23 08/30/2017    HGB 14.6 08/30/2017    HCT 41.4 08/30/2017    MCV 82.6 08/30/2017     08/30/2017    NEUTROABS 7.73 (H) 08/30/2017       Lab Results   Component Value Date     08/30/2017    K 4.2  "08/30/2017     08/30/2017    CO2 25.1 08/30/2017    BUN 11 08/30/2017    CREATININE 0.71 08/30/2017    GLUCOSE 225 (H) 08/30/2017    CALCIUM 9.6 08/30/2017    AST 37 (H) 08/30/2017    ALT 41 (H) 08/30/2017    ALKPHOS 79 08/30/2017    BILITOT 0.3 08/30/2017    PROTEINTOT 7.3 08/30/2017    ALBUMIN 4.20 08/30/2017       CBC (05/31/2017): WBCs: 10.27; HgB: 15.8; Hct: 44.7; platelets: 289  CBC (05/03/2017): WBCs: 7.8; HgB: 16.3; Hct: 45.7; platelets: 341  CBC (03/23/2017): WBCs: 5.6; HgB: 15.2; Hct: 43.6; platelets: 249  CBC (03/13/2017): WBCs: 10.0; HgB: 15.7; Hct: 45.0; platelets: 314  CBC (02/23/2017): WBCs: 6.4; HgB: 15.4; Hct: 43.9; platelets: 248    IMAGING  MRI brain with and without contrast (06/13/2016):  Impression: There are bilateral frontal changes related to prior surgery, right greater than left. There is some associated gliosis and minimal contrast enhancement in the superior leftward aspect of the surgical \"bed\". Most importantly, there has been no change since 02/22/2016.    MRI cyberknife brain with contrast (12/28/2016):  Impression: Areas of increased blood flow in the right frontal lobe in areas of abnormal contrast enhancement and therefore the abnormality seen on prior MRI is highly suspicious for tumor.    MRI brain with and without contrast (02/28/2017):  Impression: Slight interval increase in the size of the area of abnormal contrast enhancement diffusely throughout the right frontal region. The amount of surrounding edema is also increased in the interval. Findings may be related to progression of disease however postradiation changes cannot be excluded. The edema extends into the right basal ganglia and parietal lobe. No new area of abnormal contrast enhancement identified. [Per a neurosurgery read and evaluation that day, the intensity of the enhancement is somewhat lessened, and the flare appears to be about the same.]    MRI brain with and without contrast (04/27/2017):  Impression: " Increased cerebral blood volume in the area of abnormal contrast enhancement most consistent with neovascularity and tumor progression. The size and surrounding edema is stable.    MRI brain with and without contrast (05/30/2017):  Impression:  1) Intense, enhancing lesion right frontal lobe with interval evidence of increasing mass effect and edema with increasing midline shift from right to left and increasing compression of the anterior horn right lateral ventricle. The size of the enhancing lesion is very slightly larger by careful comparison and measurement. The degree of overall edema in the right frontal lobe has increased and now crosses the midline to the left frontal lobe. There is midline shift right to left which has increased somewhat since previous studies.  2) Therefore, the findings in the right frontal lobe are slowly progressive by multiple parameters. A distant lesion is not identified elsewhere.    CT cerebral perfusion with and without contrast (05/30/2017):  Impression:  1) Large mass lesion right frontal lobe exhibiting marked increase in permeability. There is mass effect and edema diffusely in the right frontal lobe with a high grade defect of cerebral blood flow with sporadic areas of preserved cerebral blood volume.  2) There is marked delay in the mean transit time and time to drain in the right frontal tumor and mass region.  3) No other cerebral insults, ischemic abnormalities or parametric map abnormalities are identified elsewhere, outside of the large right frontal mass.    PATHOLOGY    IMPRESSION AND PLAN  Ms. Alvarez is a 42 y.o., white female with:  1. Glioma: Initially diagnosed in 2007, with recurrences in 2014 and, most recently, late 2016. Status post resection x 2 (in 2007 and 2014) and localized radiation x 2 (adjuvantly in 2007; and, most recently, a Cyberknife boost delivered in January 2017). Since a third resection was felt to not be in her best interest (at least at the  time, early 2017) and she completed localized XRT, we agreed with neurosurgery's recommendation to start chemotherapy. She began Temodar (patient dose: 320 mg daily on days 1-5 of a 28 day cycle) by late January 2017 and completed five (5), qmonthly cycles. She tolerated this regimen overall well, with some mild, and manageable nausea and increasing fatigue her only noticeable side effects. With this treatment, her CBCs remained unremarkable with no developing cytopenias. Unfortunately, despite this therapy, she developed reworsening headaches; and the most recent repeat MRI was consistent with progressive disease. As a result, neurosurgery restarted her on steroids and she underwent repeat craniotomy on 6/21/17. Following this debulking surgery, she underwent powerport placement and second-line palliative therapy with [id number] Avastin was started. She completed cycle 1 on 8/16.17 and overall, she tolerated this well. She is feeling well today and labs unremarkable, therefore, will proceed with cycle 2 today. We will see her back in clinic as scheduled on 9/13/17 for follow up and toxicity check.   2. Nausea: Continue prn Zofran, compazine and dramamine. Continue to monitor.  3. Nimisha-vaginal rash/sores: resolved after diagnosis and treatment of uncontrolled diabetes.  4. Type 2 diabetes mellitus: Likely the cause of issue #3. Ongoing management per primary care.    The patient was in agreement with these plans.    It is a pleasure to participate in Ms. Devlin's care. Please do not hesitate to call with any questions or concerns that you may have.    A total of 25 minutes were spent coordinating this patient’s care in clinic today; More than 50% of the time was spent face-to-face with the patient, reviewing her interim medical history and counseling on the current treatment and followup plan.  All questions were answered to her satisfaction.    FOLLOW UP  Continue with palliative, [id number] bevacizumab (Avastin) as  scheduled, proceed with cycle 2 today. With neurosurgery as scheduled September 18th with repeat imaging. Return to clinic as scheduled on 9/13/17 with Dr. Pinto for follow up and toxicity check.       This document was electronically signed by MCKENZIE Parson August 30, 2017 11:53 AM      CC: MD Lisseth Becerra MD Beverly Paige Neace, PA

## 2017-09-08 DIAGNOSIS — C71.9 GLIOMA (HCC): ICD-10-CM

## 2017-09-08 RX ORDER — SODIUM CHLORIDE 9 MG/ML
250 INJECTION, SOLUTION INTRAVENOUS ONCE
Status: CANCELLED | OUTPATIENT
Start: 2017-09-13

## 2017-09-13 ENCOUNTER — DOCUMENTATION (OUTPATIENT)
Dept: ONCOLOGY | Facility: HOSPITAL | Age: 42
End: 2017-09-13

## 2017-09-13 ENCOUNTER — LAB (OUTPATIENT)
Dept: ONCOLOGY | Facility: CLINIC | Age: 42
End: 2017-09-13

## 2017-09-13 ENCOUNTER — INFUSION (OUTPATIENT)
Dept: ONCOLOGY | Facility: HOSPITAL | Age: 42
End: 2017-09-13

## 2017-09-13 VITALS
DIASTOLIC BLOOD PRESSURE: 87 MMHG | BODY MASS INDEX: 44.63 KG/M2 | HEART RATE: 82 BPM | TEMPERATURE: 98.3 F | OXYGEN SATURATION: 99 % | RESPIRATION RATE: 18 BRPM | SYSTOLIC BLOOD PRESSURE: 129 MMHG | WEIGHT: 244 LBS

## 2017-09-13 DIAGNOSIS — Z95.828 PORTACATH IN PLACE: ICD-10-CM

## 2017-09-13 DIAGNOSIS — C71.9 GLIOMA (HCC): ICD-10-CM

## 2017-09-13 DIAGNOSIS — C71.9 GLIOMA (HCC): Primary | ICD-10-CM

## 2017-09-13 LAB
ANION GAP SERPL CALCULATED.3IONS-SCNC: 10.1 MMOL/L (ref 3.6–11.2)
BASOPHILS # BLD AUTO: 0.04 10*3/MM3 (ref 0–0.3)
BASOPHILS NFR BLD AUTO: 0.5 % (ref 0–2)
BILIRUB UR QL STRIP: NEGATIVE
BUN BLD-MCNC: 11 MG/DL (ref 7–21)
BUN/CREAT SERPL: 12.9 (ref 7–25)
CALCIUM SPEC-SCNC: 9.2 MG/DL (ref 7.7–10)
CHLORIDE SERPL-SCNC: 105 MMOL/L (ref 99–112)
CLARITY UR: ABNORMAL
CO2 SERPL-SCNC: 21.9 MMOL/L (ref 24.3–31.9)
COLOR UR: YELLOW
CREAT BLD-MCNC: 0.85 MG/DL (ref 0.43–1.29)
DEPRECATED RDW RBC AUTO: 39.2 FL (ref 37–54)
EOSINOPHIL # BLD AUTO: 0.3 10*3/MM3 (ref 0–0.7)
EOSINOPHIL NFR BLD AUTO: 3.7 % (ref 0–5)
ERYTHROCYTE [DISTWIDTH] IN BLOOD BY AUTOMATED COUNT: 13.5 % (ref 11.5–14.5)
GFR SERPL CREATININE-BSD FRML MDRD: 73 ML/MIN/1.73
GLUCOSE BLD-MCNC: 244 MG/DL (ref 70–110)
GLUCOSE UR STRIP-MCNC: ABNORMAL MG/DL
HCT VFR BLD AUTO: 40.9 % (ref 37–47)
HGB BLD-MCNC: 14.1 G/DL (ref 12–16)
HGB UR QL STRIP.AUTO: NEGATIVE
IMM GRANULOCYTES # BLD: 0.06 10*3/MM3 (ref 0–0.03)
IMM GRANULOCYTES NFR BLD: 0.7 % (ref 0–0.5)
KETONES UR QL STRIP: ABNORMAL
LEUKOCYTE ESTERASE UR QL STRIP.AUTO: NEGATIVE
LYMPHOCYTES # BLD AUTO: 2.18 10*3/MM3 (ref 1–3)
LYMPHOCYTES NFR BLD AUTO: 27.1 % (ref 21–51)
MCH RBC QN AUTO: 28.4 PG (ref 27–33)
MCHC RBC AUTO-ENTMCNC: 34.5 G/DL (ref 33–37)
MCV RBC AUTO: 82.3 FL (ref 80–94)
MONOCYTES # BLD AUTO: 0.46 10*3/MM3 (ref 0.1–0.9)
MONOCYTES NFR BLD AUTO: 5.7 % (ref 0–10)
NEUTROPHILS # BLD AUTO: 5.01 10*3/MM3 (ref 1.4–6.5)
NEUTROPHILS NFR BLD AUTO: 62.3 % (ref 30–70)
NITRITE UR QL STRIP: NEGATIVE
OSMOLALITY SERPL CALC.SUM OF ELEC: 281.3 MOSM/KG (ref 273–305)
PH UR STRIP.AUTO: <=5 [PH] (ref 5–8)
PLATELET # BLD AUTO: 243 10*3/MM3 (ref 130–400)
PMV BLD AUTO: 10.4 FL (ref 6–10)
POTASSIUM BLD-SCNC: 4 MMOL/L (ref 3.5–5.3)
PROT UR QL STRIP: NEGATIVE
RBC # BLD AUTO: 4.97 10*6/MM3 (ref 4.2–5.4)
SODIUM BLD-SCNC: 137 MMOL/L (ref 135–153)
SP GR UR STRIP: 1.03 (ref 1–1.03)
UROBILINOGEN UR QL STRIP: ABNORMAL
WBC NRBC COR # BLD: 8.05 10*3/MM3 (ref 4.5–12.5)

## 2017-09-13 PROCEDURE — 96413 CHEMO IV INFUSION 1 HR: CPT | Performed by: NURSE PRACTITIONER

## 2017-09-13 PROCEDURE — 25010000002 BEVACIZUMAB 100 MG/4ML SOLUTION 4 ML VIAL: Performed by: INTERNAL MEDICINE

## 2017-09-13 PROCEDURE — 25010000002 BEVACIZUMAB PER 10 MG: Performed by: INTERNAL MEDICINE

## 2017-09-13 PROCEDURE — 25010000002 HEPARIN FLUSH (PORCINE) 100 UNIT/ML SOLUTION: Performed by: INTERNAL MEDICINE

## 2017-09-13 RX ORDER — SODIUM CHLORIDE 0.9 % (FLUSH) 0.9 %
10 SYRINGE (ML) INJECTION AS NEEDED
Status: DISCONTINUED | OUTPATIENT
Start: 2017-09-13 | End: 2017-09-13 | Stop reason: HOSPADM

## 2017-09-13 RX ORDER — SODIUM CHLORIDE 9 MG/ML
250 INJECTION, SOLUTION INTRAVENOUS ONCE
Status: COMPLETED | OUTPATIENT
Start: 2017-09-13 | End: 2017-09-13

## 2017-09-13 RX ADMIN — SODIUM CHLORIDE 250 ML: 9 INJECTION, SOLUTION INTRAVENOUS at 11:11

## 2017-09-13 RX ADMIN — BEVACIZUMAB 1100 MG: 400 INJECTION, SOLUTION INTRAVENOUS at 11:11

## 2017-09-13 RX ADMIN — HEPARIN SODIUM (PORCINE) LOCK FLUSH IV SOLN 100 UNIT/ML 500 UNITS: 100 SOLUTION at 12:00

## 2017-09-13 RX ADMIN — Medication 10 ML: at 12:00

## 2017-09-13 NOTE — PROGRESS NOTES
SS spoke with pt this date.  Pt states that she is doing well.  Pt states that she feels depressed at times but then realizes that she is blessed.  Pt states no needs identified at this time.  SS will follow.

## 2017-09-19 ENCOUNTER — OFFICE VISIT (OUTPATIENT)
Dept: NEUROSURGERY | Facility: CLINIC | Age: 42
End: 2017-09-19

## 2017-09-19 ENCOUNTER — HOSPITAL ENCOUNTER (OUTPATIENT)
Dept: MRI IMAGING | Facility: HOSPITAL | Age: 42
Discharge: HOME | End: 2017-09-19
Attending: NEUROLOGICAL SURGERY | Admitting: NEUROLOGICAL SURGERY

## 2017-09-19 VITALS
WEIGHT: 246 LBS | TEMPERATURE: 97 F | HEIGHT: 62 IN | BODY MASS INDEX: 45.27 KG/M2 | SYSTOLIC BLOOD PRESSURE: 125 MMHG | DIASTOLIC BLOOD PRESSURE: 85 MMHG

## 2017-09-19 DIAGNOSIS — C71.9 GLIOMA (HCC): Primary | ICD-10-CM

## 2017-09-19 DIAGNOSIS — M51.36 DEGENERATIVE DISC DISEASE, LUMBAR: ICD-10-CM

## 2017-09-19 PROCEDURE — 70553 MRI BRAIN STEM W/O & W/DYE: CPT

## 2017-09-19 PROCEDURE — 99024 POSTOP FOLLOW-UP VISIT: CPT | Performed by: NEUROLOGICAL SURGERY

## 2017-09-19 PROCEDURE — 25510000001 GADOBENATE DIMEGLUMINE 529 MG/ML SOLUTION: Performed by: NEUROLOGICAL SURGERY

## 2017-09-19 PROCEDURE — A9577 INJ MULTIHANCE: HCPCS | Performed by: NEUROLOGICAL SURGERY

## 2017-09-19 RX ADMIN — GADOBENATE DIMEGLUMINE 20 ML: 529 INJECTION, SOLUTION INTRAVENOUS at 12:30

## 2017-09-19 NOTE — PROGRESS NOTES
"Marcelina Alvarez  1975  [id number]                       CURRENT WORKING DIAGNOSIS:   Right frontal glioma         MEDICAL HISTORY SINCE LAST ENCOUNTER:   She is done quite well neurosurgically.  She has no headaches and no new symptoms.  The issue that she has is primarily pain management.  Since she is been on Temodar she states that the Neurontin no longer is beneficial.  Prior to this surgery I'd considered the possibility for dorsal column stimulator I have made her an appointment to see Dr. SAN for his evaluation and recommendations evaluation.           Past Medical History:   Diagnosis Date   • Anxiety and depression    • Arthritis     RIGHT FOOT \"DUE TO FOUR STRESS FRACTURES\"    • Brain tumor     SURGERY, CHEMO AND RADIATON    • Diabetes mellitus     DX'D TYPE II NIDDM APPROX 6 WEEKS AGO, \"CAUSED BY CHEMO\" CHECKS BS -150 IN AM -275 PM    • Eczema    • Frequent headaches    • GERD (gastroesophageal reflux disease)    • History of shingles     MULTIPLE OUTBREAKS--NO OPEN AREAS AT THIS TIME \"SHOW UP JUST ABOVE BUTTOCKS\"    • Hypertension     CONTROLLED WITH MEDS PER PT    • IBS (irritable bowel syndrome)    • Nerve damage     from shingles   • PONV (postoperative nausea and vomiting)               Past Surgical History:   Procedure Laterality Date   • BREAST SURGERY      REDUCTION    • CHOLECYSTECTOMY     • CRANIOTOMY  [id number]   • CRANIOTOMY FOR TUMOR Right 6/21/2017    Procedure: CRANIOTOMY FOR TUMOR STEREOTACTIC WITH STEALTH; 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     Social History Main " Topics   • Smoking status: Never Smoker   • Smokeless tobacco: Never Used   • Alcohol use No   • Drug use: No   • Sexual activity: Defer     Other Topics Concern   • Not on file     Social History Narrative              Allergies   Allergen Reactions   • Morphine And Related Hallucinations   • Diamox [Acetazolamide] Rash              Current Outpatient Prescriptions:   •  ACCU-CHEK JACOB PLUS test strip, USE BID, Disp: , Rfl: 3  •  ACCU-CHEK SOFTCLIX LANCETS lancets, , Disp: , Rfl:   •  ARIPiprazole (ABILIFY) 5 MG tablet, Take 5 mg by mouth Daily., Disp: , Rfl:   •  Canagliflozin (INVOKANA) 100 MG tablet, Take 1 tablet by mouth Daily., Disp: , Rfl:   •  clonazePAM (KlonoPIN) 1 MG tablet, Take 1 mg by mouth 2 (Two) Times a Day As Needed., Disp: , Rfl:   •  gabapentin (NEURONTIN) 800 MG tablet, Take 800 mg by mouth 3 (Three) Times a Day., Disp: , Rfl:   •  lidocaine-prilocaine (EMLA) 2.5-2.5 % cream, Apply 1 hour prior to VAD access, Disp: 30 g, Rfl: 5  •  lisinopril-hydrochlorothiazide (PRINZIDE,ZESTORETIC) 20-12.5 MG per tablet, Take 1 tablet by mouth Daily., Disp: , Rfl:   •  ondansetron (ZOFRAN) 8 MG tablet, Take 1 tablet by mouth Every 8 (Eight) Hours As Needed for Nausea or Vomiting. ODT if covered per insurance, Disp: 30 tablet, Rfl: 5  •  oxyCODONE-acetaminophen (PERCOCET) 5-325 MG per tablet, Take 1 tablet by mouth Every 6 (Six) Hours As Needed for Moderate Pain (4-6)., Disp: 60 tablet, Rfl: o  •  sertraline (ZOLOFT) 100 MG tablet, Take 150 mg by mouth Daily., Disp: , Rfl:   •  SITagliptin (JANUVIA) 100 MG tablet, Take 100 mg by mouth Daily., Disp: , Rfl:   •  valACYclovir (VALTREX) 500 MG tablet, Take 1,000 mg by mouth 2 (Two) Times a Day., Disp: , Rfl:   •  zolpidem (AMBIEN) 5 MG tablet, Take 5 mg by mouth Every Night., Disp: , Rfl:   No current facility-administered medications for this visit.     Facility-Administered Medications Ordered in Other Visits:   •  mupirocin (BACTROBAN) 2 % nasal ointment, ,  Nasal, BID, Ирина L KIMBERLY Interiano         Review of Systems   Constitutional: Negative for activity change, appetite change, chills, diaphoresis, fatigue, fever and unexpected weight change.   HENT: Negative for congestion, dental problem, drooling, ear discharge, ear pain, facial swelling, hearing loss, mouth sores, nosebleeds, postnasal drip, rhinorrhea, sinus pressure, sneezing, sore throat, tinnitus, trouble swallowing and voice change.    Eyes: Negative for photophobia, pain, discharge, redness, itching and visual disturbance.   Respiratory: Negative for apnea, cough, choking, chest tightness, shortness of breath, wheezing and stridor.    Cardiovascular: Negative for chest pain, palpitations and leg swelling.   Gastrointestinal: Negative for abdominal distention, abdominal pain, anal bleeding, blood in stool, constipation, diarrhea, nausea, rectal pain and vomiting.   Endocrine: Negative for cold intolerance, heat intolerance, polydipsia, polyphagia and polyuria.   Genitourinary: Negative for decreased urine volume, difficulty urinating, dysuria, enuresis, flank pain, frequency, genital sores, hematuria and urgency.   Musculoskeletal: Positive for back pain. Negative for arthralgias, gait problem, joint swelling, myalgias, neck pain and neck stiffness.   Skin: Negative for color change, pallor, rash and wound.   Allergic/Immunologic: Negative for environmental allergies, food allergies and immunocompromised state.   Neurological: Negative for dizziness, tremors, seizures, syncope, facial asymmetry, speech difficulty, weakness, light-headedness, numbness and headaches.   Hematological: Negative for adenopathy. Does not bruise/bleed easily.   Psychiatric/Behavioral: Negative for agitation, behavioral problems, confusion, decreased concentration, dysphoric mood, hallucinations, self-injury, sleep disturbance and suicidal ideas. The patient is not nervous/anxious and is not hyperactive.    All other systems reviewed  "and are negative.              Vitals:    09/19/17 1345   BP: 125/85   BP Location: Left arm   Patient Position: Sitting   Cuff Size: Adult   Temp: 97 °F (36.1 °C)   TempSrc: Temporal Artery    Weight: 246 lb (112 kg)   Height: 62\" (157.5 cm)               EXAMINATION: alert, no deficits             MEDICAL DECISION MAKING: MRI brain reveals no evidence of progression at this time            ASSESSMENT/DISPOSITION: Will continue chemotherapy; I will see in 8 weeks; I've asked her see Dr. kumar and look forward to his reply.               I APPRECIATE THE OPPORTUNITY OF THIS REFERRAL. PLEASE CALL IF ANY  QUESTIONS [phone]    Scribed for David Gregorio MD by Liza Hanley CMA. 9/19/2017  2:02 PM         "

## 2017-09-20 DIAGNOSIS — C71.9 MALIGNANT NEOPLASM OF BRAIN, UNSPECIFIED SITE: Primary | ICD-10-CM

## 2017-09-22 DIAGNOSIS — C71.9 GLIOMA (HCC): ICD-10-CM

## 2017-09-22 DIAGNOSIS — C71.9 GLIOMA (HCC): Primary | ICD-10-CM

## 2017-09-22 RX ORDER — SODIUM CHLORIDE 9 MG/ML
250 INJECTION, SOLUTION INTRAVENOUS ONCE
Status: CANCELLED | OUTPATIENT
Start: 2017-09-27

## 2017-09-27 ENCOUNTER — INFUSION (OUTPATIENT)
Dept: ONCOLOGY | Facility: HOSPITAL | Age: 42
End: 2017-09-27

## 2017-09-27 ENCOUNTER — OFFICE VISIT (OUTPATIENT)
Dept: ONCOLOGY | Facility: CLINIC | Age: 42
End: 2017-09-27

## 2017-09-27 VITALS
OXYGEN SATURATION: 98 % | BODY MASS INDEX: 44.41 KG/M2 | TEMPERATURE: 97.5 F | RESPIRATION RATE: 18 BRPM | HEART RATE: 80 BPM | OXYGEN SATURATION: 98 % | TEMPERATURE: 97.5 F | RESPIRATION RATE: 18 BRPM | SYSTOLIC BLOOD PRESSURE: 138 MMHG | SYSTOLIC BLOOD PRESSURE: 138 MMHG | DIASTOLIC BLOOD PRESSURE: 86 MMHG | WEIGHT: 242.8 LBS | WEIGHT: 242.8 LBS | BODY MASS INDEX: 44.41 KG/M2 | DIASTOLIC BLOOD PRESSURE: 86 MMHG | HEART RATE: 80 BPM

## 2017-09-27 DIAGNOSIS — C71.9 GLIOMA (HCC): Primary | ICD-10-CM

## 2017-09-27 DIAGNOSIS — Z95.828 PORTACATH IN PLACE: ICD-10-CM

## 2017-09-27 DIAGNOSIS — C71.9 MALIGNANT NEOPLASM OF BRAIN, UNSPECIFIED SITE: ICD-10-CM

## 2017-09-27 LAB
ALBUMIN SERPL-MCNC: 4.2 G/DL (ref 3.5–5)
ALBUMIN/GLOB SERPL: 1.5 G/DL (ref 1.5–2.5)
ALP SERPL-CCNC: 68 U/L (ref 35–104)
ALT SERPL W P-5'-P-CCNC: 54 U/L (ref 10–36)
ANION GAP SERPL CALCULATED.3IONS-SCNC: 7.6 MMOL/L (ref 3.6–11.2)
AST SERPL-CCNC: 42 U/L (ref 10–30)
BASOPHILS # BLD AUTO: 0.04 10*3/MM3 (ref 0–0.3)
BASOPHILS NFR BLD AUTO: 0.4 % (ref 0–2)
BILIRUB SERPL-MCNC: 0.5 MG/DL (ref 0.2–1.8)
BILIRUB UR QL STRIP: NEGATIVE
BUN BLD-MCNC: 16 MG/DL (ref 7–21)
BUN/CREAT SERPL: 20 (ref 7–25)
CALCIUM SPEC-SCNC: 9.5 MG/DL (ref 7.7–10)
CHLORIDE SERPL-SCNC: 103 MMOL/L (ref 99–112)
CLARITY UR: CLEAR
CO2 SERPL-SCNC: 23.4 MMOL/L (ref 24.3–31.9)
COLOR UR: YELLOW
CREAT BLD-MCNC: 0.8 MG/DL (ref 0.43–1.29)
DEPRECATED RDW RBC AUTO: 39.2 FL (ref 37–54)
EOSINOPHIL # BLD AUTO: 0.43 10*3/MM3 (ref 0–0.7)
EOSINOPHIL NFR BLD AUTO: 4.7 % (ref 0–5)
ERYTHROCYTE [DISTWIDTH] IN BLOOD BY AUTOMATED COUNT: 13.4 % (ref 11.5–14.5)
GFR SERPL CREATININE-BSD FRML MDRD: 78 ML/MIN/1.73
GLOBULIN UR ELPH-MCNC: 2.8 GM/DL
GLUCOSE BLD-MCNC: 187 MG/DL (ref 70–110)
GLUCOSE UR STRIP-MCNC: NEGATIVE MG/DL
HCT VFR BLD AUTO: 43.1 % (ref 37–47)
HGB BLD-MCNC: 14.8 G/DL (ref 12–16)
HGB UR QL STRIP.AUTO: NEGATIVE
IMM GRANULOCYTES # BLD: 0.04 10*3/MM3 (ref 0–0.03)
IMM GRANULOCYTES NFR BLD: 0.4 % (ref 0–0.5)
KETONES UR QL STRIP: NEGATIVE
LEUKOCYTE ESTERASE UR QL STRIP.AUTO: NEGATIVE
LYMPHOCYTES # BLD AUTO: 1.98 10*3/MM3 (ref 1–3)
LYMPHOCYTES NFR BLD AUTO: 21.5 % (ref 21–51)
MCH RBC QN AUTO: 28 PG (ref 27–33)
MCHC RBC AUTO-ENTMCNC: 34.3 G/DL (ref 33–37)
MCV RBC AUTO: 81.5 FL (ref 80–94)
MONOCYTES # BLD AUTO: 0.49 10*3/MM3 (ref 0.1–0.9)
MONOCYTES NFR BLD AUTO: 5.3 % (ref 0–10)
NEUTROPHILS # BLD AUTO: 6.22 10*3/MM3 (ref 1.4–6.5)
NEUTROPHILS NFR BLD AUTO: 67.7 % (ref 30–70)
NITRITE UR QL STRIP: NEGATIVE
OSMOLALITY SERPL CALC.SUM OF ELEC: 274.3 MOSM/KG (ref 273–305)
PH UR STRIP.AUTO: <=5 [PH] (ref 5–8)
PLATELET # BLD AUTO: 279 10*3/MM3 (ref 130–400)
PMV BLD AUTO: 10.4 FL (ref 6–10)
POTASSIUM BLD-SCNC: 3.9 MMOL/L (ref 3.5–5.3)
PROT SERPL-MCNC: 7 G/DL (ref 6–8)
PROT UR QL STRIP: NEGATIVE
RBC # BLD AUTO: 5.29 10*6/MM3 (ref 4.2–5.4)
SODIUM BLD-SCNC: 134 MMOL/L (ref 135–153)
SP GR UR STRIP: 1.02 (ref 1–1.03)
UROBILINOGEN UR QL STRIP: NORMAL
WBC NRBC COR # BLD: 9.2 10*3/MM3 (ref 4.5–12.5)

## 2017-09-27 PROCEDURE — 25010000002 BEVACIZUMAB 100 MG/4ML SOLUTION 4 ML VIAL: Performed by: INTERNAL MEDICINE

## 2017-09-27 PROCEDURE — 25010000002 BEVACIZUMAB PER 10 MG: Performed by: INTERNAL MEDICINE

## 2017-09-27 PROCEDURE — 25010000002 HEPARIN FLUSH (PORCINE) 100 UNIT/ML SOLUTION: Performed by: INTERNAL MEDICINE

## 2017-09-27 PROCEDURE — 81003 URINALYSIS AUTO W/O SCOPE: CPT

## 2017-09-27 PROCEDURE — 96413 CHEMO IV INFUSION 1 HR: CPT

## 2017-09-27 PROCEDURE — 85025 COMPLETE CBC W/AUTO DIFF WBC: CPT

## 2017-09-27 PROCEDURE — 99214 OFFICE O/P EST MOD 30 MIN: CPT | Performed by: INTERNAL MEDICINE

## 2017-09-27 PROCEDURE — 80053 COMPREHEN METABOLIC PANEL: CPT

## 2017-09-27 RX ORDER — SODIUM CHLORIDE 9 MG/ML
250 INJECTION, SOLUTION INTRAVENOUS ONCE
Status: COMPLETED | OUTPATIENT
Start: 2017-09-27 | End: 2017-09-27

## 2017-09-27 RX ADMIN — SODIUM CHLORIDE, PRESERVATIVE FREE 500 UNITS: 5 INJECTION INTRAVENOUS at 12:15

## 2017-09-27 RX ADMIN — BEVACIZUMAB 1100 MG: 400 INJECTION, SOLUTION INTRAVENOUS at 11:28

## 2017-09-27 RX ADMIN — SODIUM CHLORIDE 250 ML: 9 INJECTION, SOLUTION INTRAVENOUS at 11:24

## 2017-09-27 NOTE — PROGRESS NOTES
Name:  Marcelina Alvarez  :  1975  Date:  2017     REFERRING PHYSICIAN  David Gregorio MD    PRIMARY CARE PROVIDER  TAYLOR Heard    REASON FOR FOLLOWUP  1. Glioma      CHIEF COMPLAINT  Manageable headaches and fatigue for a few days after each cycle of Avastin, currently resolved.    Dear Dr. Gregorio,    HISTORY OF PRESENT ILLNESS:   I saw Ms. Alvarez in follow up today in our medical oncology clinic. 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 She was initially diagnosed in , and you performed a successful resection. She subsequently underwent localized XRT (but no chemotherapy). She did very well until , when she developed a localized recurrence; and, again, you performed a successful resection (this time without adjuvant XRT). She was again doing well until late , when she started to develop some mild forgetfulness and headaches; and a repeat MRI showed evidence of recurrent disease again. This time, it was felt that repeat surgery was not in her best interest (at least at that time). She underwent Cyberknife treatment in early 2017 and tolerated it well. She was subsequently referred to our clinic to consider chemotherapy (Temodar). At the time of her initial appointment with us (on 2017), she was agreeable to this treatment (patient dose: 320 mg on days 1-5 of a 28-day cycle). She did overall well on this regimen for several months; however, unfortunately, repeat imaging in May 2017 showed evidence of disease reprogression.    INTERIM HISTORY:  Ms. Alvarez returns to clinic today for follow up by herself. She underwent a repeat craniotomy for debulking on 17, as planned; and she has overall been recovering from this surgery well. She subsequently had a powerport placed and, after allowing adequate time for her to heal, she began palliative [id number] Avastin (on  "08/16/2017). She has received three (3) cycles of this medication to date and is overall tolerating them well. Her only noticeable side effects are some mild headaches and increased fatigue for a few days following each cycle. She otherwise has no new or specific complaints.    Past Medical History:   Diagnosis Date   • Anxiety and depression    • Arthritis     RIGHT FOOT \"DUE TO FOUR STRESS FRACTURES\"    • Brain tumor     SURGERY, CHEMO AND RADIATON    • Diabetes mellitus     DX'D TYPE II NIDDM APPROX 6 WEEKS AGO, \"CAUSED BY CHEMO\" CHECKS BS -150 IN AM -275 PM    • Eczema    • Frequent headaches    • GERD (gastroesophageal reflux disease)    • History of shingles     MULTIPLE OUTBREAKS--NO OPEN AREAS AT THIS TIME \"SHOW UP JUST ABOVE BUTTOCKS\"    • Hypertension     CONTROLLED WITH MEDS PER PT    • IBS (irritable bowel syndrome)    • Nerve damage     from shingles   • PONV (postoperative nausea and vomiting)        Past Surgical History:   Procedure Laterality Date   • BREAST SURGERY      REDUCTION    • CHOLECYSTECTOMY     • CRANIOTOMY  [id number]   • CRANIOTOMY FOR TUMOR Right 6/21/2017    Procedure: CRANIOTOMY FOR TUMOR STEREOTACTIC WITH STEALTH; planned right frontal lobectomy for astrocytoma progression;  Surgeon: David Gregorio MD;  Location: [facility];  Service:    • ENDOMETRIAL ABLATION         Social History     Social History   • Marital status:      Spouse name: N/A   • Number of children: N/A   • Years of education: N/A     Occupational History   • Not on file.     Social History Main Topics   • Smoking status: Never Smoker   • Smokeless tobacco: Never Used   • Alcohol use No   • Drug use: No   • Sexual activity: Defer     Other Topics Concern   • Not on file     Social History Narrative       Family History   Problem Relation Age of Onset   • Hypertension Father      pulmonary       Allergies   Allergen Reactions   • Morphine And Related Hallucinations   • Diamox [Acetazolamide] " Rash       Current Outpatient Prescriptions   Medication Sig Dispense Refill   • ACCU-CHEK JACOB PLUS test strip USE BID  3   • ACCU-CHEK SOFTCLIX LANCETS lancets      • ARIPiprazole (ABILIFY) 5 MG tablet Take 5 mg by mouth Daily.     • Canagliflozin (INVOKANA) 100 MG tablet Take 1 tablet by mouth Daily.     • clonazePAM (KlonoPIN) 1 MG tablet Take 1 mg by mouth 2 (Two) Times a Day As Needed.     • gabapentin (NEURONTIN) 800 MG tablet Take 800 mg by mouth 3 (Three) Times a Day.     • lidocaine-prilocaine (EMLA) 2.5-2.5 % cream Apply 1 hour prior to VAD access 30 g 5   • lisinopril-hydrochlorothiazide (PRINZIDE,ZESTORETIC) 20-12.5 MG per tablet Take 1 tablet by mouth Daily.     • ondansetron (ZOFRAN) 8 MG tablet Take 1 tablet by mouth Every 8 (Eight) Hours As Needed for Nausea or Vomiting. ODT if covered per insurance 30 tablet 5   • oxyCODONE-acetaminophen (PERCOCET) 5-325 MG per tablet Take 1 tablet by mouth Every 6 (Six) Hours As Needed for Moderate Pain (4-6). 60 tablet o   • sertraline (ZOLOFT) 100 MG tablet Take 150 mg by mouth Daily.     • SITagliptin (JANUVIA) 100 MG tablet Take 100 mg by mouth Daily.     • valACYclovir (VALTREX) 500 MG tablet Take 1,000 mg by mouth 2 (Two) Times a Day.     • zolpidem (AMBIEN) 5 MG tablet Take 5 mg by mouth Every Night.       No current facility-administered medications for this visit.      Facility-Administered Medications Ordered in Other Visits   Medication Dose Route Frequency Provider Last Rate Last Dose   • mupirocin (BACTROBAN) 2 % nasal ointment   Nasal BID Ирина Interiano PA-C           REVIEW OF SYSTEMS  CONSTITUTIONAL:  No fever, chills or night sweats. Intermittent fatigue, as per the HPI above.  EYES:  No blurry vision, diplopia or other vision changes.  ENT:  No hearing loss, nosebleeds or sore throat.  CARDIOVASCULAR:  No palpitations, arrhythmia, syncopal episodes or edema.  PULMONARY:  No hemoptysis, wheezing, chronic cough or shortness of  breath.  GASTROINTESTINAL:  No constipation or diarrhea.  No abdominal pain. Occasional nausea (though not as much as when she was on Temodar), controlled with prn antiemetics.  GENITOURINARY:  No hematuria, kidney stones or frequent urination. Nimisha-vaginal rash now resolved following her recent diagnosis with, and treatment for, uncontrolled diabetes.  MUSCULOSKELETAL:  No joint or back pains.  INTEGUMENTARY: No rashes or pruritus.  ENDOCRINE:  No excessive thirst or hot flashes.  HEMATOLOGIC:  No history of free bleeding, spontaneous bleeding or clotting.  IMMUNOLOGIC:  No allergies or frequent infections.  NEUROLOGIC: No numbness, tingling, seizures or weakness. Intermittent headaches, as per the HPI above.  PSYCHIATRIC:  No anxiety or depression.    PHYSICAL EXAMINATION    /86  Pulse 80  Temp 97.5 °F (36.4 °C) (Oral)   Resp 18  Wt 242 lb 12.8 oz (110 kg)  SpO2 98%  BMI 44.41 kg/m2    GENERAL:  A well-developed, well-nourished, obese, white female in no acute distress.  HEENT:  Pupils equally round and reactive to light.  Extraocular muscles intact.  CARDIOVASCULAR:  Regular rate and rhythm.  No murmurs, gallops or rubs.  LUNGS:  Clear to auscultation bilaterally.  ABDOMEN:  Soft, nontender, nondistended with positive bowel sounds.  : Deferred.  EXTREMITIES:  No clubbing, cyanosis or edema bilaterally.  SKIN:  No rashes or petechiae.  NEURO:  Cranial nerves grossly intact.  No focal deficits.  PSYCH:  Alert and oriented x3.    LABORATORY    Lab Results   Component Value Date    WBC 9.20 09/27/2017    HGB 14.8 09/27/2017    HCT 43.1 09/27/2017    MCV 81.5 09/27/2017     09/27/2017    NEUTROABS 6.22 09/27/2017       Lab Results   Component Value Date     09/13/2017    K 4.0 09/13/2017     09/13/2017    CO2 21.9 (L) 09/13/2017    BUN 11 09/13/2017    CREATININE 0.85 09/13/2017    GLUCOSE 244 (H) 09/13/2017    CALCIUM 9.2 09/13/2017    AST 37 (H) 08/30/2017    ALT 41 (H) 08/30/2017     "ALKPHOS 79 08/30/2017    BILITOT 0.3 08/30/2017    PROTEINTOT 7.3 08/30/2017    ALBUMIN 4.20 08/30/2017       CBC (05/31/2017): WBCs: 10.27; HgB: 15.8; Hct: 44.7; platelets: 289  CBC (05/03/2017): WBCs: 7.8; HgB: 16.3; Hct: 45.7; platelets: 341  CBC (03/23/2017): WBCs: 5.6; HgB: 15.2; Hct: 43.6; platelets: 249  CBC (03/13/2017): WBCs: 10.0; HgB: 15.7; Hct: 45.0; platelets: 314  CBC (02/23/2017): WBCs: 6.4; HgB: 15.4; Hct: 43.9; platelets: 248    IMAGING  MRI brain with and without contrast (06/13/2016):  Impression: There are bilateral frontal changes related to prior surgery, right greater than left. There is some associated gliosis and minimal contrast enhancement in the superior leftward aspect of the surgical \"bed\". Most importantly, there has been no change since 02/22/2016.    MRI cyberknife brain with contrast (12/28/2016):  Impression: Areas of increased blood flow in the right frontal lobe in areas of abnormal contrast enhancement and therefore the abnormality seen on prior MRI is highly suspicious for tumor.    MRI brain with and without contrast (02/28/2017):  Impression: Slight interval increase in the size of the area of abnormal contrast enhancement diffusely throughout the right frontal region. The amount of surrounding edema is also increased in the interval. Findings may be related to progression of disease however postradiation changes cannot be excluded. The edema extends into the right basal ganglia and parietal lobe. No new area of abnormal contrast enhancement identified. [Per a neurosurgery read and evaluation that day, the intensity of the enhancement is somewhat lessened, and the flare appears to be about the same.]    MRI brain with and without contrast (04/27/2017):  Impression: Increased cerebral blood volume in the area of abnormal contrast enhancement most consistent with neovascularity and tumor progression. The size and surrounding edema is stable.    MRI brain with and without " contrast (05/30/2017):  Impression:  1) Intense, enhancing lesion right frontal lobe with interval evidence of increasing mass effect and edema with increasing midline shift from right to left and increasing compression of the anterior horn right lateral ventricle. The size of the enhancing lesion is very slightly larger by careful comparison and measurement. The degree of overall edema in the right frontal lobe has increased and now crosses the midline to the left frontal lobe. There is midline shift right to left which has increased somewhat since previous studies.  2) Therefore, the findings in the right frontal lobe are slowly progressive by multiple parameters. A distant lesion is not identified elsewhere.    CT cerebral perfusion with and without contrast (05/30/2017):  Impression:  1) Large mass lesion right frontal lobe exhibiting marked increase in permeability. There is mass effect and edema diffusely in the right frontal lobe with a high grade defect of cerebral blood flow with sporadic areas of preserved cerebral blood volume.  2) There is marked delay in the mean transit time and time to drain in the right frontal tumor and mass region.  3) No other cerebral insults, ischemic abnormalities or parametric map abnormalities are identified elsewhere, outside of the large right frontal mass.    MRI brain with and without contrast (09/19/2017):  Impression:  1) Continued, interval decrease in right frontal lobe postoperative findings with overall decreased vasogenic edema and hemorrhage from prior. No acute hemorrhage or enhancing soft tissue focus to suggest residual enhancing tumor. No evidence for distant, enhancing lesion.  2) No acute intracranial pathology.    PATHOLOGY    IMPRESSION AND PLAN  Ms. Alvarez is a 42 y.o., white female with:  1. Glioma: Initially diagnosed in 2007, with recurrences in 2014, late 2016, and, most recently, summer 2017. Now status post resection x 3 (in 2007, 2014 and  06/21/2017) and localized radiation x 2 (adjuvantly in 2007; and, most recently, a Cyberknife boost delivered in January 2017). Since a third resection was felt to not be in her best interest (at least at the time, in early 2017) and she completed localized XRT, we agreed with neurosurgery's recommendation to start chemotherapy. She began Temodar (patient dose: 320 mg daily on days 1-5 of a 28 day cycle) by late January 2017 and completed five (5), qmonthly cycles. She tolerated this regimen overall well, with some mild, and manageable nausea and increasing fatigue her only noticeable side effects. With this treatment, her CBCs remained unremarkable with no developing cytopenias. Unfortunately, despite this therapy, she developed reworsening headaches; and a repeat MRI in late May 2017 was consistent with reprogressive disease. As a result, she underwent a repeat craniotomy on 06/21/17. She tolerated this debulking surgery well, and she was subsequently started on second-line, palliative therapy with [id number] Avastin. She began this treatment on 08/16/2017, has received three (3) cycles to date and is tolerating it overall very well so far. The most recent repeat MRI of the brain (performed on 09/19/2017 and summarized above) shows much improved disease. We will proceed with this current treatment plan (cycle #4 of Avastin today) as planned. She will follow up with neurosurgery again in mid-November 2017 with another repeat MRI. We will see her back in our clinic in ~2 months (shortly after the NS appointment, on the day of the eighth planned cycle of Avastin).  2. Nausea: Continue prn Zofran, compazine and dramamine. Continue to monitor.  The patient was in agreement with these plans.    It is a pleasure to participate in Ms. Devlin's care. Please do not hesitate to call with any questions or concerns that you may have.    A total of 30 minutes were spent coordinating this patient’s care in clinic today; 25 minutes  were spent face-to-face with the patient, reviewing her interim medical history, discussing the results of the recent repeat MRI and counseling on the current treatment and followup plan. All questions were answered to her satisfaction.    FOLLOW UP  Proceed with palliative, [id number] bevacizumab (Avastin) as planned (4th cycle today). With neurosurgery in ~2 months (mid-November 2017) with a repeat MRI of the brain, as previously planned. Return to our clinic in 2 months (on the day of the 8th cycle of Avastin, shortly after the NS appointment).      This document was electronically signed by JEFFY Pinto MD September 27, 2017 10:24 AM      CC: MD Lisseth Becerra MD Beverly Paige Neace, PA

## 2017-10-06 DIAGNOSIS — C71.9 GLIOMA (HCC): ICD-10-CM

## 2017-10-06 RX ORDER — SODIUM CHLORIDE 9 MG/ML
250 INJECTION, SOLUTION INTRAVENOUS ONCE
Status: CANCELLED | OUTPATIENT
Start: 2017-10-11

## 2017-10-11 ENCOUNTER — LAB (OUTPATIENT)
Dept: ONCOLOGY | Facility: CLINIC | Age: 42
End: 2017-10-11

## 2017-10-11 ENCOUNTER — INFUSION (OUTPATIENT)
Dept: ONCOLOGY | Facility: HOSPITAL | Age: 42
End: 2017-10-11

## 2017-10-11 VITALS
BODY MASS INDEX: 44.56 KG/M2 | RESPIRATION RATE: 18 BRPM | WEIGHT: 243.6 LBS | TEMPERATURE: 98.4 F | DIASTOLIC BLOOD PRESSURE: 88 MMHG | OXYGEN SATURATION: 99 % | HEART RATE: 77 BPM | SYSTOLIC BLOOD PRESSURE: 139 MMHG

## 2017-10-11 DIAGNOSIS — C71.9 GLIOMA (HCC): Primary | ICD-10-CM

## 2017-10-11 DIAGNOSIS — Z95.828 PORTACATH IN PLACE: ICD-10-CM

## 2017-10-11 DIAGNOSIS — C71.9 GLIOMA (HCC): ICD-10-CM

## 2017-10-11 LAB
ANION GAP SERPL CALCULATED.3IONS-SCNC: 7.7 MMOL/L (ref 3.6–11.2)
BASOPHILS # BLD AUTO: 0.03 10*3/MM3 (ref 0–0.3)
BASOPHILS NFR BLD AUTO: 0.4 % (ref 0–2)
BILIRUB UR QL STRIP: NEGATIVE
BUN BLD-MCNC: 14 MG/DL (ref 7–21)
BUN/CREAT SERPL: 16.3 (ref 7–25)
CALCIUM SPEC-SCNC: 9.4 MG/DL (ref 7.7–10)
CHLORIDE SERPL-SCNC: 105 MMOL/L (ref 99–112)
CLARITY UR: ABNORMAL
CO2 SERPL-SCNC: 24.3 MMOL/L (ref 24.3–31.9)
COLOR UR: YELLOW
CREAT BLD-MCNC: 0.86 MG/DL (ref 0.43–1.29)
DEPRECATED RDW RBC AUTO: 38.6 FL (ref 37–54)
EOSINOPHIL # BLD AUTO: 0.38 10*3/MM3 (ref 0–0.7)
EOSINOPHIL NFR BLD AUTO: 4.9 % (ref 0–5)
ERYTHROCYTE [DISTWIDTH] IN BLOOD BY AUTOMATED COUNT: 13.4 % (ref 11.5–14.5)
GFR SERPL CREATININE-BSD FRML MDRD: 72 ML/MIN/1.73
GLUCOSE BLD-MCNC: 183 MG/DL (ref 70–110)
GLUCOSE UR STRIP-MCNC: NEGATIVE MG/DL
HCT VFR BLD AUTO: 42.1 % (ref 37–47)
HGB BLD-MCNC: 14.7 G/DL (ref 12–16)
HGB UR QL STRIP.AUTO: NEGATIVE
IMM GRANULOCYTES # BLD: 0.04 10*3/MM3 (ref 0–0.03)
IMM GRANULOCYTES NFR BLD: 0.5 % (ref 0–0.5)
KETONES UR QL STRIP: NEGATIVE
LEUKOCYTE ESTERASE UR QL STRIP.AUTO: NEGATIVE
LYMPHOCYTES # BLD AUTO: 1.78 10*3/MM3 (ref 1–3)
LYMPHOCYTES NFR BLD AUTO: 22.9 % (ref 21–51)
MCH RBC QN AUTO: 28.1 PG (ref 27–33)
MCHC RBC AUTO-ENTMCNC: 34.9 G/DL (ref 33–37)
MCV RBC AUTO: 80.5 FL (ref 80–94)
MONOCYTES # BLD AUTO: 0.46 10*3/MM3 (ref 0.1–0.9)
MONOCYTES NFR BLD AUTO: 5.9 % (ref 0–10)
NEUTROPHILS # BLD AUTO: 5.07 10*3/MM3 (ref 1.4–6.5)
NEUTROPHILS NFR BLD AUTO: 65.4 % (ref 30–70)
NITRITE UR QL STRIP: NEGATIVE
OSMOLALITY SERPL CALC.SUM OF ELEC: 279 MOSM/KG (ref 273–305)
PH UR STRIP.AUTO: <=5 [PH] (ref 5–8)
PLATELET # BLD AUTO: 255 10*3/MM3 (ref 130–400)
PMV BLD AUTO: 10.3 FL (ref 6–10)
POTASSIUM BLD-SCNC: 4.1 MMOL/L (ref 3.5–5.3)
PROT UR QL STRIP: NEGATIVE
RBC # BLD AUTO: 5.23 10*6/MM3 (ref 4.2–5.4)
SODIUM BLD-SCNC: 137 MMOL/L (ref 135–153)
SP GR UR STRIP: 1.02 (ref 1–1.03)
UROBILINOGEN UR QL STRIP: ABNORMAL
WBC NRBC COR # BLD: 7.76 10*3/MM3 (ref 4.5–12.5)

## 2017-10-11 PROCEDURE — 25010000002 HEPARIN FLUSH (PORCINE) 100 UNIT/ML SOLUTION: Performed by: INTERNAL MEDICINE

## 2017-10-11 PROCEDURE — 96413 CHEMO IV INFUSION 1 HR: CPT

## 2017-10-11 PROCEDURE — 25010000002 BEVACIZUMAB PER 10 MG: Performed by: INTERNAL MEDICINE

## 2017-10-11 PROCEDURE — 25010000002 BEVACIZUMAB 100 MG/4ML SOLUTION 4 ML VIAL: Performed by: INTERNAL MEDICINE

## 2017-10-11 RX ORDER — SODIUM CHLORIDE 9 MG/ML
250 INJECTION, SOLUTION INTRAVENOUS ONCE
Status: COMPLETED | OUTPATIENT
Start: 2017-10-11 | End: 2017-10-11

## 2017-10-11 RX ADMIN — SODIUM CHLORIDE, PRESERVATIVE FREE 500 UNITS: 5 INJECTION INTRAVENOUS at 11:56

## 2017-10-11 RX ADMIN — SODIUM CHLORIDE 250 ML: 9 INJECTION, SOLUTION INTRAVENOUS at 11:06

## 2017-10-11 RX ADMIN — BEVACIZUMAB 1100 MG: 400 INJECTION, SOLUTION INTRAVENOUS at 11:06

## 2017-10-18 DIAGNOSIS — C71.9 GLIOMA (HCC): ICD-10-CM

## 2017-10-18 RX ORDER — SODIUM CHLORIDE 9 MG/ML
250 INJECTION, SOLUTION INTRAVENOUS ONCE
Status: CANCELLED | OUTPATIENT
Start: 2017-10-25

## 2017-10-25 ENCOUNTER — INFUSION (OUTPATIENT)
Dept: ONCOLOGY | Facility: HOSPITAL | Age: 42
End: 2017-10-25

## 2017-10-25 ENCOUNTER — LAB (OUTPATIENT)
Dept: ONCOLOGY | Facility: CLINIC | Age: 42
End: 2017-10-25

## 2017-10-25 VITALS
DIASTOLIC BLOOD PRESSURE: 87 MMHG | BODY MASS INDEX: 44.45 KG/M2 | WEIGHT: 243 LBS | TEMPERATURE: 97.7 F | OXYGEN SATURATION: 97 % | RESPIRATION RATE: 16 BRPM | HEART RATE: 80 BPM | SYSTOLIC BLOOD PRESSURE: 136 MMHG

## 2017-10-25 DIAGNOSIS — Z95.828 PORTACATH IN PLACE: Primary | ICD-10-CM

## 2017-10-25 DIAGNOSIS — C71.9 GLIOMA (HCC): ICD-10-CM

## 2017-10-25 LAB
ALBUMIN SERPL-MCNC: 4.1 G/DL (ref 3.5–5)
ALBUMIN/GLOB SERPL: 1.3 G/DL (ref 1.5–2.5)
ALP SERPL-CCNC: 65 U/L (ref 35–104)
ALT SERPL W P-5'-P-CCNC: 59 U/L (ref 10–36)
ANION GAP SERPL CALCULATED.3IONS-SCNC: 9.5 MMOL/L (ref 3.6–11.2)
AST SERPL-CCNC: 42 U/L (ref 10–30)
BASOPHILS # BLD AUTO: 0.04 10*3/MM3 (ref 0–0.3)
BASOPHILS NFR BLD AUTO: 0.4 % (ref 0–2)
BILIRUB SERPL-MCNC: 0.3 MG/DL (ref 0.2–1.8)
BILIRUB UR QL STRIP: NEGATIVE
BUN BLD-MCNC: 11 MG/DL (ref 7–21)
BUN/CREAT SERPL: 12.6 (ref 7–25)
CALCIUM SPEC-SCNC: 8.8 MG/DL (ref 7.7–10)
CHLORIDE SERPL-SCNC: 105 MMOL/L (ref 99–112)
CLARITY UR: ABNORMAL
CO2 SERPL-SCNC: 22.5 MMOL/L (ref 24.3–31.9)
COLOR UR: ABNORMAL
CREAT BLD-MCNC: 0.87 MG/DL (ref 0.43–1.29)
DEPRECATED RDW RBC AUTO: 39.7 FL (ref 37–54)
EOSINOPHIL # BLD AUTO: 0.37 10*3/MM3 (ref 0–0.7)
EOSINOPHIL NFR BLD AUTO: 3.9 % (ref 0–5)
ERYTHROCYTE [DISTWIDTH] IN BLOOD BY AUTOMATED COUNT: 13.6 % (ref 11.5–14.5)
GFR SERPL CREATININE-BSD FRML MDRD: 71 ML/MIN/1.73
GLOBULIN UR ELPH-MCNC: 3.1 GM/DL
GLUCOSE BLD-MCNC: 258 MG/DL (ref 70–110)
GLUCOSE UR STRIP-MCNC: ABNORMAL MG/DL
HCT VFR BLD AUTO: 44.2 % (ref 37–47)
HGB BLD-MCNC: 15.1 G/DL (ref 12–16)
HGB UR QL STRIP.AUTO: ABNORMAL
IMM GRANULOCYTES # BLD: 0.03 10*3/MM3 (ref 0–0.03)
IMM GRANULOCYTES NFR BLD: 0.3 % (ref 0–0.5)
KETONES UR QL STRIP: ABNORMAL
LEUKOCYTE ESTERASE UR QL STRIP.AUTO: ABNORMAL
LYMPHOCYTES # BLD AUTO: 2.18 10*3/MM3 (ref 1–3)
LYMPHOCYTES NFR BLD AUTO: 22.9 % (ref 21–51)
MCH RBC QN AUTO: 27.7 PG (ref 27–33)
MCHC RBC AUTO-ENTMCNC: 34.2 G/DL (ref 33–37)
MCV RBC AUTO: 81.1 FL (ref 80–94)
MONOCYTES # BLD AUTO: 0.6 10*3/MM3 (ref 0.1–0.9)
MONOCYTES NFR BLD AUTO: 6.3 % (ref 0–10)
NEUTROPHILS # BLD AUTO: 6.31 10*3/MM3 (ref 1.4–6.5)
NEUTROPHILS NFR BLD AUTO: 66.2 % (ref 30–70)
NITRITE UR QL STRIP: NEGATIVE
OSMOLALITY SERPL CALC.SUM OF ELEC: 282.1 MOSM/KG (ref 273–305)
PH UR STRIP.AUTO: <=5 [PH] (ref 5–8)
PLATELET # BLD AUTO: 269 10*3/MM3 (ref 130–400)
PMV BLD AUTO: 10.6 FL (ref 6–10)
POTASSIUM BLD-SCNC: 3.8 MMOL/L (ref 3.5–5.3)
PROT SERPL-MCNC: 7.2 G/DL (ref 6–8)
PROT UR QL STRIP: NEGATIVE
RBC # BLD AUTO: 5.45 10*6/MM3 (ref 4.2–5.4)
SODIUM BLD-SCNC: 137 MMOL/L (ref 135–153)
SP GR UR STRIP: 1.03 (ref 1–1.03)
UROBILINOGEN UR QL STRIP: ABNORMAL
WBC NRBC COR # BLD: 9.53 10*3/MM3 (ref 4.5–12.5)

## 2017-10-25 PROCEDURE — 25010000002 BEVACIZUMAB 100 MG/4ML SOLUTION 4 ML VIAL: Performed by: INTERNAL MEDICINE

## 2017-10-25 PROCEDURE — 25010000002 HEPARIN FLUSH (PORCINE) 100 UNIT/ML SOLUTION: Performed by: INTERNAL MEDICINE

## 2017-10-25 PROCEDURE — 96413 CHEMO IV INFUSION 1 HR: CPT

## 2017-10-25 PROCEDURE — 25010000002 BEVACIZUMAB PER 10 MG: Performed by: INTERNAL MEDICINE

## 2017-10-25 RX ORDER — SODIUM CHLORIDE 9 MG/ML
250 INJECTION, SOLUTION INTRAVENOUS ONCE
Status: COMPLETED | OUTPATIENT
Start: 2017-10-25 | End: 2017-10-25

## 2017-10-25 RX ADMIN — SODIUM CHLORIDE, PRESERVATIVE FREE 500 UNITS: 5 INJECTION INTRAVENOUS at 13:16

## 2017-10-25 RX ADMIN — BEVACIZUMAB 1100 MG: 400 INJECTION, SOLUTION INTRAVENOUS at 12:24

## 2017-10-25 RX ADMIN — SODIUM CHLORIDE 250 ML: 9 INJECTION, SOLUTION INTRAVENOUS at 12:23

## 2017-11-03 DIAGNOSIS — C71.9 GLIOMA (HCC): ICD-10-CM

## 2017-11-03 RX ORDER — SODIUM CHLORIDE 9 MG/ML
250 INJECTION, SOLUTION INTRAVENOUS ONCE
Status: CANCELLED | OUTPATIENT
Start: 2017-11-08

## 2017-11-08 ENCOUNTER — INFUSION (OUTPATIENT)
Dept: ONCOLOGY | Facility: HOSPITAL | Age: 42
End: 2017-11-08

## 2017-11-08 ENCOUNTER — LAB (OUTPATIENT)
Dept: ONCOLOGY | Facility: CLINIC | Age: 42
End: 2017-11-08

## 2017-11-08 VITALS
DIASTOLIC BLOOD PRESSURE: 85 MMHG | SYSTOLIC BLOOD PRESSURE: 129 MMHG | WEIGHT: 244 LBS | HEART RATE: 75 BPM | BODY MASS INDEX: 44.63 KG/M2 | OXYGEN SATURATION: 97 % | RESPIRATION RATE: 20 BRPM | TEMPERATURE: 97.9 F

## 2017-11-08 DIAGNOSIS — C71.9 GLIOMA (HCC): ICD-10-CM

## 2017-11-08 DIAGNOSIS — C71.9 GLIOMA (HCC): Primary | ICD-10-CM

## 2017-11-08 DIAGNOSIS — Z95.828 PORTACATH IN PLACE: ICD-10-CM

## 2017-11-08 LAB
ALBUMIN SERPL-MCNC: 4.2 G/DL (ref 3.5–5)
ALBUMIN/GLOB SERPL: 1.4 G/DL (ref 1.5–2.5)
ALP SERPL-CCNC: 65 U/L (ref 35–104)
ALT SERPL W P-5'-P-CCNC: 65 U/L (ref 10–36)
ANION GAP SERPL CALCULATED.3IONS-SCNC: 8.5 MMOL/L (ref 3.6–11.2)
AST SERPL-CCNC: 39 U/L (ref 10–30)
BASOPHILS # BLD AUTO: 0.05 10*3/MM3 (ref 0–0.3)
BASOPHILS NFR BLD AUTO: 0.6 % (ref 0–2)
BILIRUB SERPL-MCNC: 0.6 MG/DL (ref 0.2–1.8)
BILIRUB UR QL STRIP: NEGATIVE
BUN BLD-MCNC: 14 MG/DL (ref 7–21)
BUN/CREAT SERPL: 16.1 (ref 7–25)
CALCIUM SPEC-SCNC: 9.7 MG/DL (ref 7.7–10)
CHLORIDE SERPL-SCNC: 102 MMOL/L (ref 99–112)
CLARITY UR: ABNORMAL
CO2 SERPL-SCNC: 25.5 MMOL/L (ref 24.3–31.9)
COLOR UR: YELLOW
CREAT BLD-MCNC: 0.87 MG/DL (ref 0.43–1.29)
DEPRECATED RDW RBC AUTO: 40.3 FL (ref 37–54)
EOSINOPHIL # BLD AUTO: 0.28 10*3/MM3 (ref 0–0.7)
EOSINOPHIL NFR BLD AUTO: 3.2 % (ref 0–5)
ERYTHROCYTE [DISTWIDTH] IN BLOOD BY AUTOMATED COUNT: 13.8 % (ref 11.5–14.5)
GFR SERPL CREATININE-BSD FRML MDRD: 71 ML/MIN/1.73
GLOBULIN UR ELPH-MCNC: 3.1 GM/DL
GLUCOSE BLD-MCNC: 290 MG/DL (ref 70–110)
GLUCOSE UR STRIP-MCNC: ABNORMAL MG/DL
HCT VFR BLD AUTO: 44.5 % (ref 37–47)
HGB BLD-MCNC: 15.5 G/DL (ref 12–16)
HGB UR QL STRIP.AUTO: NEGATIVE
IMM GRANULOCYTES # BLD: 0.05 10*3/MM3 (ref 0–0.03)
IMM GRANULOCYTES NFR BLD: 0.6 % (ref 0–0.5)
KETONES UR QL STRIP: ABNORMAL
LEUKOCYTE ESTERASE UR QL STRIP.AUTO: NEGATIVE
LYMPHOCYTES # BLD AUTO: 2.08 10*3/MM3 (ref 1–3)
LYMPHOCYTES NFR BLD AUTO: 23.7 % (ref 21–51)
MCH RBC QN AUTO: 28.1 PG (ref 27–33)
MCHC RBC AUTO-ENTMCNC: 34.8 G/DL (ref 33–37)
MCV RBC AUTO: 80.8 FL (ref 80–94)
MONOCYTES # BLD AUTO: 0.48 10*3/MM3 (ref 0.1–0.9)
MONOCYTES NFR BLD AUTO: 5.5 % (ref 0–10)
NEUTROPHILS # BLD AUTO: 5.82 10*3/MM3 (ref 1.4–6.5)
NEUTROPHILS NFR BLD AUTO: 66.4 % (ref 30–70)
NITRITE UR QL STRIP: NEGATIVE
OSMOLALITY SERPL CALC.SUM OF ELEC: 283.1 MOSM/KG (ref 273–305)
PH UR STRIP.AUTO: <=5 [PH] (ref 5–8)
PLATELET # BLD AUTO: 251 10*3/MM3 (ref 130–400)
PMV BLD AUTO: 10.6 FL (ref 6–10)
POTASSIUM BLD-SCNC: 4.2 MMOL/L (ref 3.5–5.3)
PROT SERPL-MCNC: 7.3 G/DL (ref 6–8)
PROT UR QL STRIP: NEGATIVE
RBC # BLD AUTO: 5.51 10*6/MM3 (ref 4.2–5.4)
SODIUM BLD-SCNC: 136 MMOL/L (ref 135–153)
SP GR UR STRIP: 1.02 (ref 1–1.03)
UROBILINOGEN UR QL STRIP: ABNORMAL
WBC NRBC COR # BLD: 8.76 10*3/MM3 (ref 4.5–12.5)

## 2017-11-08 PROCEDURE — 25010000002 BEVACIZUMAB PER 10 MG: Performed by: INTERNAL MEDICINE

## 2017-11-08 PROCEDURE — 96413 CHEMO IV INFUSION 1 HR: CPT

## 2017-11-08 PROCEDURE — 25010000002 HEPARIN FLUSH (PORCINE) 100 UNIT/ML SOLUTION: Performed by: INTERNAL MEDICINE

## 2017-11-08 PROCEDURE — 25010000002 BEVACIZUMAB 100 MG/4ML SOLUTION 4 ML VIAL: Performed by: INTERNAL MEDICINE

## 2017-11-08 RX ORDER — SODIUM CHLORIDE 0.9 % (FLUSH) 0.9 %
10 SYRINGE (ML) INJECTION AS NEEDED
Status: DISCONTINUED | OUTPATIENT
Start: 2017-11-08 | End: 2017-11-08 | Stop reason: HOSPADM

## 2017-11-08 RX ORDER — SODIUM CHLORIDE 0.9 % (FLUSH) 0.9 %
10 SYRINGE (ML) INJECTION AS NEEDED
Status: CANCELLED | OUTPATIENT
Start: 2017-11-22

## 2017-11-08 RX ORDER — SODIUM CHLORIDE 9 MG/ML
250 INJECTION, SOLUTION INTRAVENOUS ONCE
Status: COMPLETED | OUTPATIENT
Start: 2017-11-08 | End: 2017-11-08

## 2017-11-08 RX ADMIN — BEVACIZUMAB 1100 MG: 400 INJECTION, SOLUTION INTRAVENOUS at 12:05

## 2017-11-08 RX ADMIN — Medication 10 ML: at 12:55

## 2017-11-08 RX ADMIN — HEPARIN SODIUM (PORCINE) LOCK FLUSH IV SOLN 100 UNIT/ML 500 UNITS: 100 SOLUTION at 12:56

## 2017-11-08 RX ADMIN — SODIUM CHLORIDE 250 ML: 9 INJECTION, SOLUTION INTRAVENOUS at 12:05

## 2017-11-14 ENCOUNTER — OFFICE VISIT (OUTPATIENT)
Dept: NEUROSURGERY | Facility: CLINIC | Age: 42
End: 2017-11-14

## 2017-11-14 ENCOUNTER — HOSPITAL ENCOUNTER (OUTPATIENT)
Dept: MRI IMAGING | Facility: HOSPITAL | Age: 42
Discharge: HOME | End: 2017-11-14
Attending: NEUROLOGICAL SURGERY | Admitting: NEUROLOGICAL SURGERY

## 2017-11-14 ENCOUNTER — TELEPHONE (OUTPATIENT)
Dept: PAIN MEDICINE | Facility: CLINIC | Age: 42
End: 2017-11-14

## 2017-11-14 VITALS — BODY MASS INDEX: 44.72 KG/M2 | TEMPERATURE: 97.7 F | WEIGHT: 243 LBS | HEIGHT: 62 IN

## 2017-11-14 DIAGNOSIS — C71.1 MALIGNANT NEOPLASM OF FRONTAL LOBE (HCC): Primary | ICD-10-CM

## 2017-11-14 PROCEDURE — 0 GADOBENATE DIMEGLUMINE 529 MG/ML SOLUTION: Performed by: NEUROLOGICAL SURGERY

## 2017-11-14 PROCEDURE — 99213 OFFICE O/P EST LOW 20 MIN: CPT | Performed by: NEUROLOGICAL SURGERY

## 2017-11-14 PROCEDURE — A9577 INJ MULTIHANCE: HCPCS | Performed by: NEUROLOGICAL SURGERY

## 2017-11-14 PROCEDURE — 70553 MRI BRAIN STEM W/O & W/DYE: CPT

## 2017-11-14 RX ADMIN — GADOBENATE DIMEGLUMINE 19 ML: 529 INJECTION, SOLUTION INTRAVENOUS at 12:00

## 2017-11-14 NOTE — PROGRESS NOTES
"Marcelina Alvarez  1975  [id number]                       CURRENT WORKING DIAGNOSIS:   Right frontal glioma         MEDICAL HISTORY SINCE LAST ENCOUNTER:   She reports for her 8 week follow-up with no new neurological symptoms.  She is doing well; tolerating her chemotherapy well.           Past Medical History:   Diagnosis Date   • Anxiety and depression    • Arthritis     RIGHT FOOT \"DUE TO FOUR STRESS FRACTURES\"    • Brain tumor     SURGERY, CHEMO AND RADIATON    • Diabetes mellitus     DX'D TYPE II NIDDM APPROX 6 WEEKS AGO, \"CAUSED BY CHEMO\" CHECKS BS -150 IN AM -275 PM    • Eczema    • Frequent headaches    • GERD (gastroesophageal reflux disease)    • History of shingles     MULTIPLE OUTBREAKS--NO OPEN AREAS AT THIS TIME \"SHOW UP JUST ABOVE BUTTOCKS\"    • Hypertension     CONTROLLED WITH MEDS PER PT    • IBS (irritable bowel syndrome)    • Nerve damage     from shingles   • PONV (postoperative nausea and vomiting)               Past Surgical History:   Procedure Laterality Date   • BREAST SURGERY      REDUCTION    • CHOLECYSTECTOMY     • CRANIOTOMY  [id number]   • CRANIOTOMY FOR TUMOR Right 6/21/2017    Procedure: CRANIOTOMY FOR TUMOR STEREOTACTIC WITH STEALTH; 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     Social History Main Topics   • Smoking status: Never Smoker   • Smokeless tobacco: Never Used   • Alcohol use No   • Drug use: No   • Sexual activity: Defer     Other Topics Concern   • Not on file     Social History Narrative              Allergies   Allergen Reactions   • Morphine And Related " Hallucinations   • Diamox [Acetazolamide] Rash              Current Outpatient Prescriptions:   •  ACCU-CHEK JACOB PLUS test strip, USE BID, Disp: , Rfl: 3  •  ACCU-CHEK SOFTCLIX LANCETS lancets, , Disp: , Rfl:   •  ARIPiprazole (ABILIFY) 5 MG tablet, Take 5 mg by mouth Daily., Disp: , Rfl:   •  clonazePAM (KlonoPIN) 1 MG tablet, Take 1 mg by mouth 2 (Two) Times a Day As Needed., Disp: , Rfl:   •  gabapentin (NEURONTIN) 800 MG tablet, Take 800 mg by mouth 3 (Three) Times a Day., Disp: , Rfl:   •  lidocaine-prilocaine (EMLA) 2.5-2.5 % cream, Apply 1 hour prior to VAD access, Disp: 30 g, Rfl: 5  •  lisinopril-hydrochlorothiazide (PRINZIDE,ZESTORETIC) 20-12.5 MG per tablet, Take 1 tablet by mouth Daily., Disp: , Rfl:   •  ondansetron (ZOFRAN) 8 MG tablet, Take 1 tablet by mouth Every 8 (Eight) Hours As Needed for Nausea or Vomiting. ODT if covered per insurance, Disp: 30 tablet, Rfl: 5  •  oxyCODONE-acetaminophen (PERCOCET) 5-325 MG per tablet, Take 1 tablet by mouth Every 6 (Six) Hours As Needed for Moderate Pain (4-6)., Disp: 60 tablet, Rfl: o  •  sertraline (ZOLOFT) 100 MG tablet, Take 150 mg by mouth Daily., Disp: , Rfl:   •  SITagliptin (JANUVIA) 100 MG tablet, Take 100 mg by mouth Daily., Disp: , Rfl:   •  valACYclovir (VALTREX) 500 MG tablet, Take 1,000 mg by mouth 2 (Two) Times a Day., Disp: , Rfl:   •  zolpidem (AMBIEN) 5 MG tablet, Take 5 mg by mouth Every Night., Disp: , Rfl:   No current facility-administered medications for this visit.     Facility-Administered Medications Ordered in Other Visits:   •  mupirocin (BACTROBAN) 2 % nasal ointment, , Nasal, BID, Ирина Interiano PA-C         Review of Systems   Constitutional: Negative for activity change, appetite change, chills, diaphoresis, fatigue, fever and unexpected weight change.   HENT: Negative for congestion, dental problem, drooling, ear discharge, ear pain, facial swelling, hearing loss, mouth sores, nosebleeds, postnasal drip, rhinorrhea, sinus  "pressure, sneezing, sore throat, tinnitus, trouble swallowing and voice change.    Eyes: Negative for photophobia, pain, discharge, redness, itching and visual disturbance.   Respiratory: Negative for apnea, cough, choking, chest tightness, shortness of breath, wheezing and stridor.    Cardiovascular: Negative for chest pain, palpitations and leg swelling.   Gastrointestinal: Negative for abdominal distention, abdominal pain, anal bleeding, blood in stool, constipation, diarrhea, nausea, rectal pain and vomiting.   Endocrine: Negative for cold intolerance, heat intolerance, polydipsia, polyphagia and polyuria.   Genitourinary: Negative for decreased urine volume, difficulty urinating, dysuria, enuresis, flank pain, frequency, genital sores, hematuria and urgency.   Musculoskeletal: Positive for back pain. Negative for arthralgias, gait problem, joint swelling, myalgias, neck pain and neck stiffness.   Skin: Negative for color change, pallor, rash and wound.   Allergic/Immunologic: Negative for environmental allergies, food allergies and immunocompromised state.   Neurological: Negative for dizziness, tremors, seizures, syncope, facial asymmetry, speech difficulty, weakness, light-headedness, numbness and headaches.   Hematological: Negative for adenopathy. Does not bruise/bleed easily.   Psychiatric/Behavioral: Negative for agitation, behavioral problems, confusion, decreased concentration, dysphoric mood, hallucinations, self-injury, sleep disturbance and suicidal ideas. The patient is not nervous/anxious and is not hyperactive.    All other systems reviewed and are negative.              Vitals:    11/14/17 1232   Temp: 97.7 °F (36.5 °C)   Weight: 243 lb (110 kg)   Height: 62\" (157.5 cm)               EXAMINATION: [Focal weakness sensory loss or reflex asymmetry. ]            MEDICAL DECISION MAKING: MRI shows continued resolution of the postoperative changes in the context of edema.  There is no evidence of " progression at this time.           ASSESSMENT/DISPOSITION: She will return to see me in 8 weeks.  In the interim she will continue with her chemotherapy.              I APPRECIATE THE OPPORTUNITY OF THIS REFERRAL. PLEASE CALL IF ANY  QUESTIONS [phone]    Scribed for David Gregorio MD by Liza Hanley CMA. 11/14/2017  12:54 PM       I have read and concur with the information provided by the scribe.  David Gregorio MD

## 2017-11-16 ENCOUNTER — OFFICE VISIT (OUTPATIENT)
Dept: PAIN MEDICINE | Facility: CLINIC | Age: 42
End: 2017-11-16

## 2017-11-16 VITALS
HEART RATE: 79 BPM | BODY MASS INDEX: 45.08 KG/M2 | WEIGHT: 245 LBS | DIASTOLIC BLOOD PRESSURE: 92 MMHG | OXYGEN SATURATION: 97 % | RESPIRATION RATE: 20 BRPM | SYSTOLIC BLOOD PRESSURE: 139 MMHG | TEMPERATURE: 97.2 F | HEIGHT: 62 IN

## 2017-11-16 DIAGNOSIS — F41.9 ANXIETY AND DEPRESSION: ICD-10-CM

## 2017-11-16 DIAGNOSIS — Z51.89 ENCOUNTER FOR OTHER SPECIFIED AFTERCARE: ICD-10-CM

## 2017-11-16 DIAGNOSIS — C71.9 GLIOMA (HCC): ICD-10-CM

## 2017-11-16 DIAGNOSIS — E66.01 MORBID OBESITY (HCC): ICD-10-CM

## 2017-11-16 DIAGNOSIS — E11.8 TYPE 2 DIABETES MELLITUS WITH COMPLICATION, WITHOUT LONG-TERM CURRENT USE OF INSULIN (HCC): ICD-10-CM

## 2017-11-16 DIAGNOSIS — Z01.818 ENCOUNTER FOR PREOPERATIVE EXAMINATION FOR GENERAL SURGICAL PROCEDURE: ICD-10-CM

## 2017-11-16 DIAGNOSIS — F32.A ANXIETY AND DEPRESSION: ICD-10-CM

## 2017-11-16 DIAGNOSIS — B02.29 POSTHERPETIC NEURALGIA: ICD-10-CM

## 2017-11-16 DIAGNOSIS — C71.9 ASTROCYTOMA (HCC): ICD-10-CM

## 2017-11-16 DIAGNOSIS — Z01.818 PRE-PROCEDURAL EXAMINATION: ICD-10-CM

## 2017-11-16 DIAGNOSIS — E08.21 DIABETES MELLITUS DUE TO UNDERLYING CONDITION WITH DIABETIC NEPHROPATHY, WITHOUT LONG-TERM CURRENT USE OF INSULIN (HCC): ICD-10-CM

## 2017-11-16 PROCEDURE — 99204 OFFICE O/P NEW MOD 45 MIN: CPT | Performed by: ANESTHESIOLOGY

## 2017-11-16 NOTE — PROGRESS NOTES
"Chief Complaint: \"Pain in my lower back and both legs to my feet.\"     History of Present Illness:   Patient: Ms. Marcelina Alvarez, 42 y.o. female   Referring physician: Dr. David Gregorio  Reason for referral: Consultation for intractable chronic lower back pain secondary to postherpetic neuralgia.   Pain history: Patient reports a 9-year history of pain, which began after multiple (greater than 70) cases of shingles. Pain has progressed in intensity over the past 4 months. Her last outbreak was 6 weeks ago. Lesions are centered around the spine without spread into her legs.  Pain description: constant pain with intermittent exacerbation, described as aching and burning sensation.   Radiation of pain: The pain radiates into the posterior aspect of the gluteal, lateral aspect of the hips, lateral aspect of the thighs, lateral aspect of the legs, lateral aspect of the ankles and plantar and dorsal aspect of the feet.  Pain intensity today: 6/10  Average pain intensity last week: 9/10  Pain intensity ranges from: 5/10 to 10/10  Aggravating factors: Pain increases with heat.  Alleviating factors: Pain decreases with nothing.   Associated symptoms:   Patient reports pain, numbness and sometimes weakness in the bilateral lower extremities.   Patient denies any new bladder or bowel problems.   Patient denies difficulties with her balance or recent falls.      Review of previous therapies and additional medical records:  Marcelina Alvarez has already failed the following measures, including:   Conservative measures: oral analgesics (extensive trials with oral analgesics including but not limited to gabapentin, Lyrica, opioids, amitriptyline),  physical therapy, heat, changing beds, steroid shots every 3 months that helps only for a few days.   Interventional measures: None  Surgical measures: No previous lumbar spine surgery  Marcelina Alvarez underwent neurosurgical  consultation with Dr. Daivd Gregorio and was found not to be a " surgical candidate.  Marcelina Alvarez presents with significant comorbidities including anxiety and depression, engaged in treatment, morbid obesity, hypertension, non-insulin dependent diabetes, PHN, GERD, headaches, astrocytoma, glioma, osteoarthritis, engaged in treatment.  In terms of current analgesics, Marcelina Alvarez takes: gabapentin, ibuprofen and occasionally opioids left over from her surgery  I have reviewed Felipe Report #[id number] consistent to medication reconciliation.     Review of Diagnostic Studies:    MRI BRAIN W WO CONTRAST- 11/14/2017. COMPARISON: Comparison is made to previous studies of 2 months ago, 09/19/2017.  FINDINGS: There is a complex septated cystic lesion in the right frontal area anterior to the rightward aspect of the genu of the corpus callosum and anterior to the anterior horn of the right lateral ventricle. This cystic mass is surrounded by reactive gliosis with increased T2 and FLAIR signal extending in both the right frontal and left frontal lobes across the midline. 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 On the enhanced datasets, there is perimeter enhancement of this cystic mass, however, the configuration size and degree of enhancement is stable. There is dural enhancement of the right frontal reflection, also stable. Enhancing lesion elsewhere is not currently identified. No other acute focal abnormalities are identified within the brain. There is no extracerebral collection or midline shift. The FLAIR datasets are  negative for advanced white matter disease. The flow-voids are within normal limits. The patient has ballooning of the sella turcica with near empty sella with marked pituitary atrophy, a finding that is stable from previous exams.  There is no evidence of acute restricted diffusion.  IMPRESSION: Complex septated cystic mass with perimeter enhancement right  "frontal region surrounded by gliotic increased FLAIR and T2 signal radiating into the contralateral left frontal lobe and surrounding the mass in the right frontal lobe. 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 The size of the right frontal cystic lesion, the degree of perimeter enhancement and the overall degree of abnormal FLAIR and T2 signal in the frontal lobes is stable without change or progression when compared to previous studies of 09/19/2017. Further, new or enhancing lesion elsewhere is not currently identified. Therefore, the findings in the brain, more specifically in the frontal lobes, are stable when compared to the most recent studies.    Review of Systems   Musculoskeletal: Positive for back pain.   Neurological: Positive for weakness and numbness.   All other systems reviewed and are negative.        Patient Active Problem List   Diagnosis   • Glioma   • Type 2 diabetes mellitus with complication, without long-term current use of insulin   • Astrocytoma   • Portacath in place   • Postherpetic neuralgia   • Morbid obesity   • Anxiety and depression       Past Medical History:   Diagnosis Date   • Anxiety and depression    • Arthritis     RIGHT FOOT \"DUE TO FOUR STRESS FRACTURES\"    • Brain tumor     SURGERY, CHEMO AND RADIATON    • Diabetes mellitus     DX'D TYPE II NIDDM APPROX 6 WEEKS AGO, \"CAUSED BY CHEMO\" CHECKS BS -150 IN AM -275 PM    • Eczema    • Frequent headaches    • GERD (gastroesophageal reflux disease)    • History of shingles     MULTIPLE OUTBREAKS--NO OPEN AREAS AT THIS TIME \"SHOW UP JUST ABOVE BUTTOCKS\"    • Hypertension     CONTROLLED WITH MEDS PER PT    • IBS (irritable bowel syndrome)    • Low back pain    • Nerve damage     from shingles   • PONV (postoperative nausea and vomiting)          Past Surgical History:   Procedure Laterality Date   • BREAST SURGERY      REDUCTION    • CHOLECYSTECTOMY     • CRANIOTOMY  [id number]   • CRANIOTOMY FOR " TUMOR Right 6/21/2017    Procedure: CRANIOTOMY FOR TUMOR STEREOTACTIC WITH STEALTH; planned right frontal lobectomy for astrocytoma progression;  Surgeon: David Gregorio MD;  Location: [facility];  Service:    • ENDOMETRIAL ABLATION           Family History   Problem Relation Age of Onset   • Hypertension Father      pulmonary         Social History     Social History   • Marital status:      Spouse name: N/A   • Number of children: N/A   • Years of education: N/A     Social History Main Topics   • Smoking status: Never Smoker   • Smokeless tobacco: Never Used   • Alcohol use No   • Drug use: No   • Sexual activity: Defer     Other Topics Concern   • None     Social History Narrative           Current Outpatient Prescriptions:   •  ACCU-CHEK JACOB PLUS test strip, USE BID, Disp: , Rfl: 3  •  ACCU-CHEK SOFTCLIX LANCETS lancets, , Disp: , Rfl:   •  ARIPiprazole (ABILIFY) 5 MG tablet, Take 5 mg by mouth Daily., Disp: , Rfl:   •  clonazePAM (KlonoPIN) 1 MG tablet, Take 1 mg by mouth 2 (Two) Times a Day As Needed., Disp: , Rfl:   •  gabapentin (NEURONTIN) 800 MG tablet, Take 800 mg by mouth 3 (Three) Times a Day., Disp: , Rfl:   •  lidocaine-prilocaine (EMLA) 2.5-2.5 % cream, Apply 1 hour prior to VAD access, Disp: 30 g, Rfl: 5  •  lisinopril-hydrochlorothiazide (PRINZIDE,ZESTORETIC) 20-12.5 MG per tablet, Take 1 tablet by mouth Daily., Disp: , Rfl:   •  ondansetron (ZOFRAN) 8 MG tablet, Take 1 tablet by mouth Every 8 (Eight) Hours As Needed for Nausea or Vomiting. ODT if covered per insurance, Disp: 30 tablet, Rfl: 5  •  oxyCODONE-acetaminophen (PERCOCET) 5-325 MG per tablet, Take 1 tablet by mouth Every 6 (Six) Hours As Needed for Moderate Pain (4-6)., Disp: 60 tablet, Rfl: o  •  sertraline (ZOLOFT) 100 MG tablet, Take 150 mg by mouth Daily., Disp: , Rfl:   •  SITagliptin (JANUVIA) 100 MG tablet, Take 100 mg by mouth Daily., Disp: , Rfl:   •  valACYclovir (VALTREX) 500 MG tablet, Take 1,000 mg by mouth 2 (Two)  "Times a Day., Disp: , Rfl:   •  zolpidem (AMBIEN) 5 MG tablet, Take 5 mg by mouth Every Night., Disp: , Rfl:   No current facility-administered medications for this visit.     Facility-Administered Medications Ordered in Other Visits:   •  mupirocin (BACTROBAN) 2 % nasal ointment, , Nasal, BID, Ирина QUIQUE Interiano PA-C      Allergies   Allergen Reactions   • Morphine And Related Hallucinations   • Diamox [Acetazolamide] Rash         /92 (BP Location: Left arm, Patient Position: Sitting)  Pulse 79  Temp 97.2 °F (36.2 °C) (Temporal Artery )   Resp 20  Ht 62\" (157.5 cm)  Wt 245 lb (111 kg)  SpO2 97%  BMI 44.81 kg/m2      Physical Exam:  Constitutional: Patient is oriented to person, place, and time. Vital signs are normal. Patient appears well-developed and well-nourished.   HENT: Head: Normocephalic and atraumatic. Eyes: Conjunctivae and lids are normal. Pupils: Equal, round, reactive to light.   Neck: Trachea normal. Neck supple. No JVD present.   Pulmonary Respiratory effort: No increased work of breathing or signs of respiratory distress. Auscultation of lungs: Clear to auscultation.   Cardiovascular Auscultation of heart: Normal rate and rhythm, normal S1 and S2, no murmurs.   Abdomen: The abdomen was soft and nontender. Bowel sounds were normal.   Musculoskeletal   Gait and station: Gait evaluation demonstrated a normal gait   Lumbar spine: The range of motion of the lumbar spine is full and without pain. Extension, flexion, lateral flexion, rotation of the lumbar spine did not increase or reproduce pain. Lumbar facet joint loading maneuvers are negative.   Bashir and Gaenslen's tests are negative   Piriformis maneuvers are negative   Palpation of the bilateral ischial tuberosities, unrevealing   Palpation of the bilateral greater trochanter, unrevealing   Examination of the Iliotibial band: unrevealing   Hip joints: The range of motion of the hip joints is full and without pain   Neurological: Patient " is alert and oriented to person, place, and time. Speech: speech is normal. Cortical function: Normal mental status.   Cranial nerves: Cranial nerves 2-12 intact.   Reflex Scores:  Right patellar: 2+  Left patellar: 2+  Right achilles: 1+  Left achilles: 1+  Motor strength: 5/5  Motor Tone: normal tone.   Involuntary movements: none.   Superficial/Primitive Reflexes: primitive reflexes were absent.   Right Smith: absent  Left Smith: absent  Right ankle clonus: absent  Left ankle clonus: absent   No nuchal rigidity. Negative Kernig and Brudzinski.  Spurling sign is negative. Lhermitte sign is negative. Negative long tract signs. Straight leg raising test is negative. Femoral stretch sign is negative.   Sensation: No sensory loss. Sensory exam: intact to light touch, intact pain and temperature sensation, intact vibration sensation and normal proprioception. There is some hyperalgesia and hyperesthesia in the lumbar region  Coordination: Normal finger to nose and heel to shin. Normal balance and negative. Romberg's sign negative.   Skin and subcutaneous tissue: Skin is warm and intact. No rash noted. No cyanosis.   Psychiatric: Judgment and insight: Normal. Orientation to person, place and time: Normal. Recent and remote memory: Intact. Mood and affect: Normal.     ASSESSMENT:   1. Postherpetic neuralgia    2. Type 2 diabetes mellitus with complication, without long-term current use of insulin    3. Glioma    4. Astrocytoma    5. Morbid obesity    6. Anxiety and depression      PLAN/MEDICAL DECISION MAKING:  I had a lengthy conversation with Ms. Marcelina Alvarez regarding her chronic pain condition and potential therapeutic options including risks, benefits, alternative therapies, to name a few. Patient presents with intractable PHN with a history of more than 70 episodes of shingles. Patient has failed to obtain pain relief with extensive trials with conservative measures, as referenced above. I have reviewed all  available patient's medical records as well as previous therapies as referenced above. Patient has not undergone an MRI of the lumbar spine (she reports having one in [place] 10 years ago but after calling all diagnostic centers in [place] and her PCP, we could not find records). Patient is undergoing chemotherapy for treatment of right frontal glioma. MRI of the brain revealed no evidence of progression at this time. She has done quite well neurosurgically. Therefore, I have proposed the following plan:  1. Diagnostic studies:   A. MRI of the thoracic and lumbar spine without contrast for surgical planning  B. CBC, PT, PTT  2. Pharmacological measures: Reviewed. Discussed. Patient takes gabapentin, ibuprofen and occasionally hydrocodone   3. Interventional pain management measures: Patient will be scheduled for a spinal cord stimulator trial with [facility]. Patient has received an educational DVD on spinal cord stimulation therapies.  4. Long-term rehabilitation efforts:  A. The patient does not have a history of falls. I did complete a risk assessment for falls.   B. Patient will start a comprehensive physical therapy program for reconditioning, therapeutic exercise, core strengthening, gait and balance training, neurodynamics and desensitization techniques once pain is under control  C. Start an exercise program such as water therapy  D. Referral to Dr. Milo Esposito for psychological screening for spinal cord stimulation therapies.  E. Referral to [facility]  F. Referral to endocrinology, if possible in [place], in consultation for diabetes and obesity  5. The patient has been instructed to contact my office with any questions or difficulties. The patient understands the plan and agrees to proceed accordingly.    I spent 50 minutes face-to-face with the patient, of which 30  minutes were spent counseling regarding evaluation, diagnosis, prognosis, diagnostic testing, potential  referrals, treatment options for chronic pain condition and overall rehabilitation, long-term management of concurrent comorbidities affecting effective pain control, risk and benefits of different interventions, alternative therapies, a comprehensive plan of care to address physical deconditioning, risks and benefits as it relates to spinal cord stimulator devices for trial and implantation and long-term management and functional goals of spinal cord stimulation      Patient Care Team:  TAYLOR Heard as PCP - General (Physician Assistant)  David Gregorio MD as Consulting Physician (Neurosurgery)  Lisseth Mckeon MD as Consulting Physician (Radiation Oncology)  JEFFY Pinto MD as Consulting Physician (Hematology and Oncology)  Burton Heaton MD as Consulting Physician (Pain Medicine)     No orders of the defined types were placed in this encounter.        Future Appointments  Date Time Provider Department Center   11/22/2017 10:00 AM [facility] COR OP INFUS CHAIR 9  COR INF COR   11/22/2017 10:00 AM [facility] NURSE LAB [facility] ONC COR COR   11/22/2017 12:30 PM MCKENZIE Parson [facility] ONC COR COR   12/6/2017 10:30 AM  COR OP INFUS CHAIR 11  COR INF COR   12/6/2017 10:30 AM [facility] NURSE LAB [facility] ONC [facility] COR   12/6/2017 12:30 PM JEFFY Pinto MD [facility] ONC COR COR   1/9/2018 10:00 AM [facility] MRI 3T  JANEL MRI JANEL   1/9/2018 12:00 PM David Gregorio MD MGE NS JANEL None         Burton Heaton MD     EMR Dragon/Transcription disclaimer:  Much of this encounter note is an electronic transcription of spoken language to printed text. Electronic transcription of spoken language may permit erroneous, or at times, nonsensical words or phrases to be inadvertently transcribed. Although I have reviewed the note for such errors, some may still exist.

## 2017-11-17 DIAGNOSIS — C71.9 GLIOMA (HCC): ICD-10-CM

## 2017-11-17 RX ORDER — SODIUM CHLORIDE 9 MG/ML
250 INJECTION, SOLUTION INTRAVENOUS ONCE
Status: CANCELLED | OUTPATIENT
Start: 2017-11-22

## 2017-11-17 RX ORDER — SODIUM CHLORIDE 9 MG/ML
250 INJECTION, SOLUTION INTRAVENOUS ONCE
Status: CANCELLED | OUTPATIENT
Start: 2017-12-06

## 2017-11-22 ENCOUNTER — OFFICE VISIT (OUTPATIENT)
Dept: ONCOLOGY | Facility: CLINIC | Age: 42
End: 2017-11-22

## 2017-11-22 ENCOUNTER — INFUSION (OUTPATIENT)
Dept: ONCOLOGY | Facility: HOSPITAL | Age: 42
End: 2017-11-22

## 2017-11-22 ENCOUNTER — LAB (OUTPATIENT)
Dept: ONCOLOGY | Facility: CLINIC | Age: 42
End: 2017-11-22

## 2017-11-22 VITALS
TEMPERATURE: 97.7 F | RESPIRATION RATE: 18 BRPM | SYSTOLIC BLOOD PRESSURE: 136 MMHG | WEIGHT: 244.2 LBS | HEART RATE: 77 BPM | OXYGEN SATURATION: 99 % | BODY MASS INDEX: 44.66 KG/M2 | DIASTOLIC BLOOD PRESSURE: 77 MMHG

## 2017-11-22 VITALS
WEIGHT: 244.2 LBS | TEMPERATURE: 97.7 F | SYSTOLIC BLOOD PRESSURE: 136 MMHG | HEART RATE: 77 BPM | OXYGEN SATURATION: 99 % | RESPIRATION RATE: 18 BRPM | DIASTOLIC BLOOD PRESSURE: 77 MMHG | BODY MASS INDEX: 44.66 KG/M2

## 2017-11-22 DIAGNOSIS — Z95.828 PORTACATH IN PLACE: ICD-10-CM

## 2017-11-22 DIAGNOSIS — C71.9 GLIOMA (HCC): Primary | ICD-10-CM

## 2017-11-22 DIAGNOSIS — R11.0 NAUSEA: ICD-10-CM

## 2017-11-22 DIAGNOSIS — C71.9 GLIOMA (HCC): ICD-10-CM

## 2017-11-22 LAB
ANION GAP SERPL CALCULATED.3IONS-SCNC: 9.1 MMOL/L (ref 3.6–11.2)
BASOPHILS # BLD AUTO: 0.03 10*3/MM3 (ref 0–0.3)
BASOPHILS NFR BLD AUTO: 0.4 % (ref 0–2)
BILIRUB UR QL STRIP: NEGATIVE
BUN BLD-MCNC: 11 MG/DL (ref 7–21)
BUN/CREAT SERPL: 13.6 (ref 7–25)
CALCIUM SPEC-SCNC: 9.1 MG/DL (ref 7.7–10)
CHLORIDE SERPL-SCNC: 102 MMOL/L (ref 99–112)
CLARITY UR: ABNORMAL
CO2 SERPL-SCNC: 23.9 MMOL/L (ref 24.3–31.9)
COLOR UR: YELLOW
CREAT BLD-MCNC: 0.81 MG/DL (ref 0.43–1.29)
DEPRECATED RDW RBC AUTO: 40 FL (ref 37–54)
EOSINOPHIL # BLD AUTO: 0.26 10*3/MM3 (ref 0–0.7)
EOSINOPHIL NFR BLD AUTO: 3.3 % (ref 0–5)
ERYTHROCYTE [DISTWIDTH] IN BLOOD BY AUTOMATED COUNT: 14 % (ref 11.5–14.5)
GFR SERPL CREATININE-BSD FRML MDRD: 77 ML/MIN/1.73
GLUCOSE BLD-MCNC: 230 MG/DL (ref 70–110)
GLUCOSE UR STRIP-MCNC: ABNORMAL MG/DL
HCT VFR BLD AUTO: 44.9 % (ref 37–47)
HGB BLD-MCNC: 15.6 G/DL (ref 12–16)
HGB UR QL STRIP.AUTO: NEGATIVE
IMM GRANULOCYTES # BLD: 0.03 10*3/MM3 (ref 0–0.03)
IMM GRANULOCYTES NFR BLD: 0.4 % (ref 0–0.5)
KETONES UR QL STRIP: ABNORMAL
LEUKOCYTE ESTERASE UR QL STRIP.AUTO: NEGATIVE
LYMPHOCYTES # BLD AUTO: 2.2 10*3/MM3 (ref 1–3)
LYMPHOCYTES NFR BLD AUTO: 27.7 % (ref 21–51)
MCH RBC QN AUTO: 27.7 PG (ref 27–33)
MCHC RBC AUTO-ENTMCNC: 34.7 G/DL (ref 33–37)
MCV RBC AUTO: 79.6 FL (ref 80–94)
MONOCYTES # BLD AUTO: 0.4 10*3/MM3 (ref 0.1–0.9)
MONOCYTES NFR BLD AUTO: 5 % (ref 0–10)
NEUTROPHILS # BLD AUTO: 5.02 10*3/MM3 (ref 1.4–6.5)
NEUTROPHILS NFR BLD AUTO: 63.2 % (ref 30–70)
NITRITE UR QL STRIP: NEGATIVE
OSMOLALITY SERPL CALC.SUM OF ELEC: 276.8 MOSM/KG (ref 273–305)
PH UR STRIP.AUTO: <=5 [PH] (ref 5–8)
PLATELET # BLD AUTO: 253 10*3/MM3 (ref 130–400)
PMV BLD AUTO: 10.6 FL (ref 6–10)
POTASSIUM BLD-SCNC: 4.2 MMOL/L (ref 3.5–5.3)
PROT UR QL STRIP: NEGATIVE
RBC # BLD AUTO: 5.64 10*6/MM3 (ref 4.2–5.4)
SODIUM BLD-SCNC: 135 MMOL/L (ref 135–153)
SP GR UR STRIP: >=1.03 (ref 1–1.03)
UROBILINOGEN UR QL STRIP: ABNORMAL
WBC NRBC COR # BLD: 7.94 10*3/MM3 (ref 4.5–12.5)

## 2017-11-22 PROCEDURE — 25010000002 BEVACIZUMAB 100 MG/4ML SOLUTION 4 ML VIAL: Performed by: INTERNAL MEDICINE

## 2017-11-22 PROCEDURE — 80048 BASIC METABOLIC PNL TOTAL CA: CPT | Performed by: INTERNAL MEDICINE

## 2017-11-22 PROCEDURE — 99214 OFFICE O/P EST MOD 30 MIN: CPT | Performed by: NURSE PRACTITIONER

## 2017-11-22 PROCEDURE — 96413 CHEMO IV INFUSION 1 HR: CPT | Performed by: NURSE PRACTITIONER

## 2017-11-22 PROCEDURE — 81003 URINALYSIS AUTO W/O SCOPE: CPT | Performed by: INTERNAL MEDICINE

## 2017-11-22 PROCEDURE — 85025 COMPLETE CBC W/AUTO DIFF WBC: CPT | Performed by: INTERNAL MEDICINE

## 2017-11-22 PROCEDURE — 25010000002 HEPARIN FLUSH (PORCINE) 100 UNIT/ML SOLUTION: Performed by: INTERNAL MEDICINE

## 2017-11-22 PROCEDURE — 25010000002 BEVACIZUMAB PER 10 MG: Performed by: INTERNAL MEDICINE

## 2017-11-22 RX ORDER — SODIUM CHLORIDE 0.9 % (FLUSH) 0.9 %
10 SYRINGE (ML) INJECTION AS NEEDED
Status: DISCONTINUED | OUTPATIENT
Start: 2017-11-22 | End: 2017-11-22 | Stop reason: HOSPADM

## 2017-11-22 RX ORDER — SODIUM CHLORIDE 9 MG/ML
250 INJECTION, SOLUTION INTRAVENOUS ONCE
Status: COMPLETED | OUTPATIENT
Start: 2017-11-22 | End: 2017-11-22

## 2017-11-22 RX ORDER — SODIUM CHLORIDE 0.9 % (FLUSH) 0.9 %
10 SYRINGE (ML) INJECTION AS NEEDED
Status: CANCELLED | OUTPATIENT
Start: 2017-12-06

## 2017-11-22 RX ADMIN — BEVACIZUMAB 1100 MG: 400 INJECTION, SOLUTION INTRAVENOUS at 11:25

## 2017-11-22 RX ADMIN — Medication 10 ML: at 12:15

## 2017-11-22 RX ADMIN — HEPARIN SODIUM (PORCINE) LOCK FLUSH IV SOLN 100 UNIT/ML 500 UNITS: 100 SOLUTION at 12:15

## 2017-11-22 RX ADMIN — SODIUM CHLORIDE 250 ML: 9 INJECTION, SOLUTION INTRAVENOUS at 11:25

## 2017-11-22 NOTE — PROGRESS NOTES
Name:  Marcelina Alvarez  :  1975  Date:  2017     REFERRING PHYSICIAN  David Gregorio MD    PRIMARY CARE PROVIDER  TAYLOR Heard    REASON FOR FOLLOWUP  1. Glioma      CHIEF COMPLAINT  Manageable headaches, fatigue and nausea for a few days after each cycle of Avastin, currently resolved.    Dear Dr. Gregorio,    HISTORY OF PRESENT ILLNESS:   I saw Ms. Alvarez in follow up today in our medical oncology clinic. 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 She was initially diagnosed in , and you performed a successful resection. She subsequently underwent localized XRT (but no chemotherapy). She did very well until , when she developed a localized recurrence; and, again, you performed a successful resection (this time without adjuvant XRT). She was again doing well until late , when she started to develop some mild forgetfulness and headaches; and a repeat MRI showed evidence of recurrent disease again. This time, it was felt that repeat surgery was not in her best interest (at least at that time). She underwent Cyberknife treatment in early 2017 and tolerated it well. She was subsequently referred to our clinic to consider chemotherapy (Temodar). At the time of her initial appointment with us (on 2017), she was agreeable to this treatment (patient dose: 320 mg on days 1-5 of a 28-day cycle). She did overall well on this regimen for several months; however, unfortunately, repeat imaging in May 2017 showed evidence of disease reprogression.    INTERIM HISTORY:  Ms. Alvarez returns to clinic today for follow up by herself. She underwent a repeat craniotomy for debulking on 17, as planned; and she has overall been recovering from this surgery well. She subsequently had a powerport placed and, after allowing adequate time for her to heal, she began palliative [id number] Avastin  "(on 08/16/2017). She has received seven (7) cycles to date and is overall tolerating them well. Her only noticeable side effects are some mild headaches, nausea and fatigue for a few days following each cycle. She otherwise has no new or specific complaints. She followed up with Neurosurgery, Dr. Gregorio, last week and had repeat MRI of brain which revealed no evidence of disease progression. She also reports being seen by a pain specialist in Dr. Gregorio' office and it was suggested that she have spinal cord stimulation surgery to help reduce her chronic nerve pain. She is planning to have workup for this surgery (which is tentatively planned for mid-December) this Friday with MRI of her spine. She says they will be contacting Dr. Pinto for clearance and if he does not want her to have the surgery, she would rather not have all of the workup done.      Past Medical History:   Diagnosis Date   • Anxiety and depression    • Arthritis     RIGHT FOOT \"DUE TO FOUR STRESS FRACTURES\"    • Brain tumor     SURGERY, CHEMO AND RADIATON    • Diabetes mellitus     DX'D TYPE II NIDDM APPROX 6 WEEKS AGO, \"CAUSED BY CHEMO\" CHECKS BS -150 IN AM -275 PM    • Eczema    • Frequent headaches    • GERD (gastroesophageal reflux disease)    • History of shingles     MULTIPLE OUTBREAKS--NO OPEN AREAS AT THIS TIME \"SHOW UP JUST ABOVE BUTTOCKS\"    • Hypertension     CONTROLLED WITH MEDS PER PT    • IBS (irritable bowel syndrome)    • Low back pain    • Nerve damage     from shingles   • PONV (postoperative nausea and vomiting)        Past Surgical History:   Procedure Laterality Date   • BREAST SURGERY      REDUCTION    • CHOLECYSTECTOMY     • CRANIOTOMY  [id number]   • CRANIOTOMY FOR TUMOR Right 6/21/2017    Procedure: CRANIOTOMY FOR TUMOR STEREOTACTIC WITH STEALTH; planned right frontal lobectomy for astrocytoma progression;  Surgeon: David Gregorio MD;  Location: [facility];  Service:    • ENDOMETRIAL ABLATION         Social " History     Social History   • Marital status:      Spouse name: N/A   • Number of children: N/A   • Years of education: N/A     Occupational History   • Not on file.     Social History Main Topics   • Smoking status: Never Smoker   • Smokeless tobacco: Never Used   • Alcohol use No   • Drug use: No   • Sexual activity: Defer     Other Topics Concern   • Not on file     Social History Narrative       Family History   Problem Relation Age of Onset   • Hypertension Father      pulmonary       Allergies   Allergen Reactions   • Morphine And Related Hallucinations   • Diamox [Acetazolamide] Rash       Current Outpatient Prescriptions   Medication Sig Dispense Refill   • ACCU-CHEK JACOB PLUS test strip USE BID  3   • ACCU-CHEK SOFTCLIX LANCETS lancets      • ARIPiprazole (ABILIFY) 5 MG tablet Take 5 mg by mouth Daily.     • clonazePAM (KlonoPIN) 1 MG tablet Take 1 mg by mouth 2 (Two) Times a Day As Needed.     • gabapentin (NEURONTIN) 800 MG tablet Take 800 mg by mouth 3 (Three) Times a Day.     • lidocaine-prilocaine (EMLA) 2.5-2.5 % cream Apply 1 hour prior to VAD access 30 g 5   • lisinopril-hydrochlorothiazide (PRINZIDE,ZESTORETIC) 20-12.5 MG per tablet Take 1 tablet by mouth Daily.     • ondansetron (ZOFRAN) 8 MG tablet Take 1 tablet by mouth Every 8 (Eight) Hours As Needed for Nausea or Vomiting. ODT if covered per insurance 30 tablet 5   • oxyCODONE-acetaminophen (PERCOCET) 5-325 MG per tablet Take 1 tablet by mouth Every 6 (Six) Hours As Needed for Moderate Pain (4-6). 60 tablet o   • sertraline (ZOLOFT) 100 MG tablet Take 150 mg by mouth Daily.     • SITagliptin (JANUVIA) 100 MG tablet Take 100 mg by mouth Daily.     • valACYclovir (VALTREX) 500 MG tablet Take 1,000 mg by mouth 2 (Two) Times a Day.     • zolpidem (AMBIEN) 5 MG tablet Take 5 mg by mouth Every Night.       No current facility-administered medications for this visit.      Facility-Administered Medications Ordered in Other Visits   Medication  Dose Route Frequency Provider Last Rate Last Dose   • heparin flush (porcine) 100 UNIT/ML injection 500 Units  500 Units Intravenous PRN T Deni Pinto MD   500 Units at 11/22/17 1215   • mupirocin (BACTROBAN) 2 % nasal ointment   Nasal BID Ирина Interiano PA-C       • sodium chloride 0.9 % flush 10 mL  10 mL Intravenous PRN T Deni Pinto MD   10 mL at 11/22/17 1215       REVIEW OF SYSTEMS  CONSTITUTIONAL:  No fever, chills or night sweats. Intermittent fatigue, as per the HPI above.  EYES:  No blurry vision, diplopia or other vision changes.  ENT:  No hearing loss, nosebleeds or sore throat.  CARDIOVASCULAR:  No palpitations, arrhythmia, syncopal episodes or edema.  PULMONARY:  No hemoptysis, wheezing, chronic cough or shortness of breath.  GASTROINTESTINAL:  No constipation or diarrhea.  No abdominal pain. Occasional nausea (though not as much as when she was on Temodar), controlled with prn antiemetics.  GENITOURINARY:  No hematuria, kidney stones or frequent urination.  MUSCULOSKELETAL:  No joint or back pains.  INTEGUMENTARY: No rashes or pruritus.  ENDOCRINE:  No excessive thirst or hot flashes.  HEMATOLOGIC:  No history of free bleeding, spontaneous bleeding or clotting.  IMMUNOLOGIC:  No allergies or frequent infections.  NEUROLOGIC: Chronic nerve pain. Intermittent headaches, as per the HPI above.  PSYCHIATRIC:  No anxiety or depression.    PHYSICAL EXAMINATION    /77  Pulse 77  Temp 97.7 °F (36.5 °C) (Oral)   Resp 18  Wt 244 lb 3.2 oz (111 kg)  SpO2 99%  BMI 44.66 kg/m2  GENERAL:  A well-developed, well-nourished, obese, white female in no acute distress.  HEENT:  Pupils equally round and reactive to light.  Extraocular muscles intact.  CARDIOVASCULAR:  Regular rate and rhythm.  No murmurs, gallops or rubs.  LUNGS:  Clear to auscultation bilaterally.  ABDOMEN:  Soft, nontender, nondistended with positive bowel sounds.  : Deferred.  EXTREMITIES:  No clubbing, cyanosis or edema  "bilaterally.  SKIN:  No rashes or petechiae.  NEURO:  Cranial nerves grossly intact.  No focal deficits.  PSYCH:  Alert and oriented x3.    LABORATORY    Lab Results   Component Value Date    WBC 7.94 11/22/2017    HGB 15.6 11/22/2017    HCT 44.9 11/22/2017    MCV 79.6 (L) 11/22/2017     11/22/2017    NEUTROABS 5.02 11/22/2017       Lab Results   Component Value Date     11/22/2017    K 4.2 11/22/2017     11/22/2017    CO2 23.9 (L) 11/22/2017    BUN 11 11/22/2017    CREATININE 0.81 11/22/2017    GLUCOSE 230 (H) 11/22/2017    CALCIUM 9.1 11/22/2017    AST 39 (H) 11/08/2017    ALT 65 (H) 11/08/2017    ALKPHOS 65 11/08/2017    BILITOT 0.6 11/08/2017    PROTEINTOT 7.3 11/08/2017    ALBUMIN 4.20 11/08/2017       CBC (05/31/2017): WBCs: 10.27; HgB: 15.8; Hct: 44.7; platelets: 289  CBC (05/03/2017): WBCs: 7.8; HgB: 16.3; Hct: 45.7; platelets: 341  CBC (03/23/2017): WBCs: 5.6; HgB: 15.2; Hct: 43.6; platelets: 249  CBC (03/13/2017): WBCs: 10.0; HgB: 15.7; Hct: 45.0; platelets: 314  CBC (02/23/2017): WBCs: 6.4; HgB: 15.4; Hct: 43.9; platelets: 248    IMAGING  MRI brain with and without contrast (06/13/2016):  Impression: There are bilateral frontal changes related to prior surgery, right greater than left. There is some associated gliosis and minimal contrast enhancement in the superior leftward aspect of the surgical \"bed\". Most importantly, there has been no change since 02/22/2016.    MRI cyberknife brain with contrast (12/28/2016):  Impression: Areas of increased blood flow in the right frontal lobe in areas of abnormal contrast enhancement and therefore the abnormality seen on prior MRI is highly suspicious for tumor.    MRI brain with and without contrast (02/28/2017):  Impression: Slight interval increase in the size of the area of abnormal contrast enhancement diffusely throughout the right frontal region. The amount of surrounding edema is also increased in the interval. Findings may be related to " progression of disease however postradiation changes cannot be excluded. The edema extends into the right basal ganglia and parietal lobe. No new area of abnormal contrast enhancement identified. [Per a neurosurgery read and evaluation that day, the intensity of the enhancement is somewhat lessened, and the flare appears to be about the same.]    MRI brain with and without contrast (04/27/2017):  Impression: Increased cerebral blood volume in the area of abnormal contrast enhancement most consistent with neovascularity and tumor progression. The size and surrounding edema is stable.    MRI brain with and without contrast (05/30/2017):  Impression:  1) Intense, enhancing lesion right frontal lobe with interval evidence of increasing mass effect and edema with increasing midline shift from right to left and increasing compression of the anterior horn right lateral ventricle. The size of the enhancing lesion is very slightly larger by careful comparison and measurement. The degree of overall edema in the right frontal lobe has increased and now crosses the midline to the left frontal lobe. There is midline shift right to left which has increased somewhat since previous studies.  2) Therefore, the findings in the right frontal lobe are slowly progressive by multiple parameters. A distant lesion is not identified elsewhere.    CT cerebral perfusion with and without contrast (05/30/2017):  Impression:  1) Large mass lesion right frontal lobe exhibiting marked increase in permeability. There is mass effect and edema diffusely in the right frontal lobe with a high grade defect of cerebral blood flow with sporadic areas of preserved cerebral blood volume.  2) There is marked delay in the mean transit time and time to drain in the right frontal tumor and mass region.  3) No other cerebral insults, ischemic abnormalities or parametric map abnormalities are identified elsewhere, outside of the large right frontal mass.    MRI  brain with and without contrast (09/19/2017):  Impression:  1) Continued, interval decrease in right frontal lobe postoperative findings with overall decreased vasogenic edema and hemorrhage from prior. No acute hemorrhage or enhancing soft tissue focus to suggest residual enhancing tumor. No evidence for distant, enhancing lesion.  2) No acute intracranial pathology.    MRI brain with and without contrast (11/14/17)  IMPRESSION:  1. Complex septated cystic mass with perimeter enhancement right frontal  region surrounded by gliotic increased FLAIR and T2 signal radiating  into the contralateral left frontal lobe and surrounding the mass in the  right frontal lobe. 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  2. The size of the right frontal cystic lesion, the degree of perimeter  enhancement and the overall degree of abnormal FLAIR and T2 signal in  the frontal lobes is stable without change or progression when compared  to previous studies of 09/19/2017.  3. Further, new or enhancing lesion elsewhere is not currently  identified. Therefore, the findings in the brain, more specifically in  the frontal lobes, are stable when compared to the most recent studies.  Other incidental findings as noted above.    IMPRESSION AND PLAN  Ms. Alvarez is a 42 y.o., white female with:  1. Glioma: Initially diagnosed in 2007, with recurrences in 2014, late 2016, and, most recently, summer 2017. Now status post resection x 3 (in 2007, 2014 and 06/21/2017) and localized radiation x 2 (adjuvantly in 2007; and, most recently, a Cyberknife boost delivered in January 2017). Since a third resection was felt to not be in her best interest (at least at the time, in early 2017) and she completed localized XRT, we agreed with neurosurgery's recommendation to start chemotherapy. She began Temodar (patient dose: 320 mg daily on days 1-5 of a 28 day cycle) by late January 2017 and completed five (5), qmonthly cycles. She  tolerated this regimen overall well, with some mild, and manageable nausea and increasing fatigue her only noticeable side effects. With this treatment, her CBCs remained unremarkable with no developing cytopenias. Unfortunately, despite this therapy, she developed reworsening headaches; and a repeat MRI in late May 2017 was consistent with reprogressive disease. As a result, she underwent a repeat craniotomy on 06/21/17. She tolerated this debulking surgery well, and she was subsequently started on second-line, palliative therapy with [id number] Avastin. She began this treatment on 08/16/2017, has received seven (7) cycles to date and is tolerating it overall very well. The most recent repeat MRI of the brain (performed on 11/14/2017 and summarized above) shows no evidence of disease progression. We will proceed with this current treatment plan (cycle #8 of Avastin today) as planned. She will follow up with neurosurgery again in eight weeks with another repeat MRI. She is possibly being scheduled for spinal cord stimulation surgery (being followed by pain specialist in [place]) to help reduce her chronic nerve pain which would likely be beneficial to her. She says they will be contacting Dr. Pinto for clearance. Discussed this with Dr. Pinto today. Since surgery is tentatively planned for mid-December, will proceed with cycle 8 today as above. She will follow up with Dr. Pinto again in two weeks as scheduled and we will likely hold treatment with the next cycle until a week or two after the procedure.    2. Nausea: Continue prn Zofran, compazine and dramamine. Continue to monitor.    The patient was in agreement with these plans.    It is a pleasure to participate in Ms. Devlin's care. Please do not hesitate to call with any questions or concerns that you may have.    A total of 25 minutes were spent coordinating this patient’s care in clinic today; More than 50% of the time was spent face-to-face with the patient,  reviewing her interim medical history, discussing the results of the recent repeat MRI and counseling on the current treatment and followup plan. All questions were answered to her satisfaction.    FOLLOW UP  Proceed with palliative, [id number] bevacizumab (Avastin) as planned (8th cycle today). With neurosurgery in ~2 months with a repeat MRI of the brain. Return to our clinic in 2 weeks and likely hold treatment with next cycle for a tentatively planned spinal cord stimulation surgery in mid-December and resume treatment a week or two following the procedure.       This document was electronically signed by MCKENZIE Parson November 22, 2017 1:04 PM      CC: MD Lisseth Becerra MD Beverly Paige Neace, PA

## 2017-11-24 ENCOUNTER — HOSPITAL ENCOUNTER (OUTPATIENT)
Dept: MRI IMAGING | Facility: HOSPITAL | Age: 42
Discharge: HOME | End: 2017-11-24
Attending: ANESTHESIOLOGY

## 2017-11-24 ENCOUNTER — HOSPITAL ENCOUNTER (OUTPATIENT)
Dept: MRI IMAGING | Facility: HOSPITAL | Age: 42
Discharge: HOME | End: 2017-11-24
Attending: ANESTHESIOLOGY | Admitting: ANESTHESIOLOGY

## 2017-11-24 PROCEDURE — 72148 MRI LUMBAR SPINE W/O DYE: CPT

## 2017-11-24 PROCEDURE — 72146 MRI CHEST SPINE W/O DYE: CPT

## 2017-12-06 ENCOUNTER — OFFICE VISIT (OUTPATIENT)
Dept: ONCOLOGY | Facility: CLINIC | Age: 42
End: 2017-12-06

## 2017-12-06 ENCOUNTER — LAB (OUTPATIENT)
Dept: ONCOLOGY | Facility: CLINIC | Age: 42
End: 2017-12-06

## 2017-12-06 ENCOUNTER — INFUSION (OUTPATIENT)
Dept: ONCOLOGY | Facility: HOSPITAL | Age: 42
End: 2017-12-06

## 2017-12-06 VITALS
TEMPERATURE: 97.6 F | HEART RATE: 80 BPM | OXYGEN SATURATION: 97 % | BODY MASS INDEX: 45.3 KG/M2 | SYSTOLIC BLOOD PRESSURE: 138 MMHG | WEIGHT: 247.7 LBS | RESPIRATION RATE: 18 BRPM | DIASTOLIC BLOOD PRESSURE: 95 MMHG

## 2017-12-06 VITALS
DIASTOLIC BLOOD PRESSURE: 95 MMHG | RESPIRATION RATE: 18 BRPM | WEIGHT: 247.7 LBS | TEMPERATURE: 97.6 F | HEART RATE: 80 BPM | BODY MASS INDEX: 45.3 KG/M2 | OXYGEN SATURATION: 97 % | SYSTOLIC BLOOD PRESSURE: 138 MMHG

## 2017-12-06 DIAGNOSIS — C71.9 GLIOMA (HCC): ICD-10-CM

## 2017-12-06 DIAGNOSIS — Z95.828 PORTACATH IN PLACE: ICD-10-CM

## 2017-12-06 DIAGNOSIS — C71.9 GLIOMA (HCC): Primary | ICD-10-CM

## 2017-12-06 DIAGNOSIS — Z95.828 PORTACATH IN PLACE: Primary | ICD-10-CM

## 2017-12-06 LAB
ANION GAP SERPL CALCULATED.3IONS-SCNC: 7.9 MMOL/L (ref 3.6–11.2)
APTT PPP: 28.9 SECONDS (ref 23.8–36.1)
BASOPHILS # BLD AUTO: 0.04 10*3/MM3 (ref 0–0.3)
BASOPHILS # BLD AUTO: 0.06 10*3/MM3 (ref 0–0.3)
BASOPHILS NFR BLD AUTO: 0.5 % (ref 0–2)
BASOPHILS NFR BLD AUTO: 0.7 % (ref 0–2)
BILIRUB UR QL STRIP: NEGATIVE
BUN BLD-MCNC: 11 MG/DL (ref 7–21)
BUN/CREAT SERPL: 13.6 (ref 7–25)
CALCIUM SPEC-SCNC: 9.3 MG/DL (ref 7.7–10)
CHLORIDE SERPL-SCNC: 103 MMOL/L (ref 99–112)
CLARITY UR: CLEAR
CO2 SERPL-SCNC: 26.1 MMOL/L (ref 24.3–31.9)
COLOR UR: YELLOW
CREAT BLD-MCNC: 0.81 MG/DL (ref 0.43–1.29)
DEPRECATED RDW RBC AUTO: 39.4 FL (ref 37–54)
DEPRECATED RDW RBC AUTO: 39.8 FL (ref 37–54)
EOSINOPHIL # BLD AUTO: 0.37 10*3/MM3 (ref 0–0.7)
EOSINOPHIL # BLD AUTO: 0.43 10*3/MM3 (ref 0–0.7)
EOSINOPHIL NFR BLD AUTO: 4.2 % (ref 0–5)
EOSINOPHIL NFR BLD AUTO: 5 % (ref 0–5)
ERYTHROCYTE [DISTWIDTH] IN BLOOD BY AUTOMATED COUNT: 13.9 % (ref 11.5–14.5)
ERYTHROCYTE [DISTWIDTH] IN BLOOD BY AUTOMATED COUNT: 13.9 % (ref 11.5–14.5)
GFR SERPL CREATININE-BSD FRML MDRD: 77 ML/MIN/1.73
GLUCOSE BLD-MCNC: 205 MG/DL (ref 70–110)
GLUCOSE UR STRIP-MCNC: ABNORMAL MG/DL
HCT VFR BLD AUTO: 43.7 % (ref 37–47)
HCT VFR BLD AUTO: 43.8 % (ref 37–47)
HGB BLD-MCNC: 15.1 G/DL (ref 12–16)
HGB BLD-MCNC: 15.3 G/DL (ref 12–16)
HGB UR QL STRIP.AUTO: NEGATIVE
IMM GRANULOCYTES # BLD: 0.03 10*3/MM3 (ref 0–0.03)
IMM GRANULOCYTES # BLD: 0.05 10*3/MM3 (ref 0–0.03)
IMM GRANULOCYTES NFR BLD: 0.3 % (ref 0–0.5)
IMM GRANULOCYTES NFR BLD: 0.6 % (ref 0–0.5)
INR PPP: 0.95 (ref 0.9–1.1)
KETONES UR QL STRIP: ABNORMAL
LEUKOCYTE ESTERASE UR QL STRIP.AUTO: NEGATIVE
LYMPHOCYTES # BLD AUTO: 2.32 10*3/MM3 (ref 1–3)
LYMPHOCYTES # BLD AUTO: 2.41 10*3/MM3 (ref 1–3)
LYMPHOCYTES NFR BLD AUTO: 26.8 % (ref 21–51)
LYMPHOCYTES NFR BLD AUTO: 27.1 % (ref 21–51)
MCH RBC QN AUTO: 27.4 PG (ref 27–33)
MCH RBC QN AUTO: 27.7 PG (ref 27–33)
MCHC RBC AUTO-ENTMCNC: 34.5 G/DL (ref 33–37)
MCHC RBC AUTO-ENTMCNC: 35 G/DL (ref 33–37)
MCV RBC AUTO: 79.2 FL (ref 80–94)
MCV RBC AUTO: 79.5 FL (ref 80–94)
MONOCYTES # BLD AUTO: 0.52 10*3/MM3 (ref 0.1–0.9)
MONOCYTES # BLD AUTO: 0.55 10*3/MM3 (ref 0.1–0.9)
MONOCYTES NFR BLD AUTO: 5.8 % (ref 0–10)
MONOCYTES NFR BLD AUTO: 6.4 % (ref 0–10)
NEUTROPHILS # BLD AUTO: 5.26 10*3/MM3 (ref 1.4–6.5)
NEUTROPHILS # BLD AUTO: 5.5 10*3/MM3 (ref 1.4–6.5)
NEUTROPHILS NFR BLD AUTO: 60.7 % (ref 30–70)
NEUTROPHILS NFR BLD AUTO: 61.9 % (ref 30–70)
NITRITE UR QL STRIP: NEGATIVE
OSMOLALITY SERPL CALC.SUM OF ELEC: 279.1 MOSM/KG (ref 273–305)
PH UR STRIP.AUTO: <=5 [PH] (ref 5–8)
PLATELET # BLD AUTO: 242 10*3/MM3 (ref 130–400)
PLATELET # BLD AUTO: 247 10*3/MM3 (ref 130–400)
PMV BLD AUTO: 10.2 FL (ref 6–10)
PMV BLD AUTO: 10.5 FL (ref 6–10)
POTASSIUM BLD-SCNC: 4.2 MMOL/L (ref 3.5–5.3)
PROT UR QL STRIP: NEGATIVE
PROTHROMBIN TIME: 12.8 SECONDS (ref 11–15.4)
RBC # BLD AUTO: 5.51 10*6/MM3 (ref 4.2–5.4)
RBC # BLD AUTO: 5.52 10*6/MM3 (ref 4.2–5.4)
SODIUM BLD-SCNC: 137 MMOL/L (ref 135–153)
SP GR UR STRIP: 1.02 (ref 1–1.03)
UROBILINOGEN UR QL STRIP: ABNORMAL
WBC NRBC COR # BLD: 8.65 10*3/MM3 (ref 4.5–12.5)
WBC NRBC COR # BLD: 8.89 10*3/MM3 (ref 4.5–12.5)

## 2017-12-06 PROCEDURE — 80048 BASIC METABOLIC PNL TOTAL CA: CPT | Performed by: INTERNAL MEDICINE

## 2017-12-06 PROCEDURE — 85730 THROMBOPLASTIN TIME PARTIAL: CPT | Performed by: ANESTHESIOLOGY

## 2017-12-06 PROCEDURE — 81003 URINALYSIS AUTO W/O SCOPE: CPT | Performed by: INTERNAL MEDICINE

## 2017-12-06 PROCEDURE — 85025 COMPLETE CBC W/AUTO DIFF WBC: CPT | Performed by: INTERNAL MEDICINE

## 2017-12-06 PROCEDURE — 85610 PROTHROMBIN TIME: CPT | Performed by: ANESTHESIOLOGY

## 2017-12-06 PROCEDURE — 85025 COMPLETE CBC W/AUTO DIFF WBC: CPT | Performed by: ANESTHESIOLOGY

## 2017-12-06 PROCEDURE — 99214 OFFICE O/P EST MOD 30 MIN: CPT | Performed by: INTERNAL MEDICINE

## 2017-12-06 RX ORDER — SODIUM CHLORIDE 0.9 % (FLUSH) 0.9 %
10 SYRINGE (ML) INJECTION AS NEEDED
Status: CANCELLED | OUTPATIENT
Start: 2018-01-01

## 2017-12-06 RX ORDER — SODIUM CHLORIDE 0.9 % (FLUSH) 0.9 %
10 SYRINGE (ML) INJECTION AS NEEDED
Status: DISCONTINUED | OUTPATIENT
Start: 2017-12-06 | End: 2017-12-06 | Stop reason: HOSPADM

## 2017-12-06 RX ADMIN — HEPARIN SODIUM (PORCINE) LOCK FLUSH IV SOLN 100 UNIT/ML 500 UNITS: 100 SOLUTION at 10:45

## 2017-12-06 RX ADMIN — Medication 10 ML: at 10:45

## 2017-12-06 NOTE — PROGRESS NOTES
Name:  Marcelina Alvarez  :  1975  Date:  2017     REFERRING PHYSICIAN  David Gregorio MD    PRIMARY CARE PROVIDER  TAYLOR Heard    REASON FOR FOLLOWUP  1. Glioma      CHIEF COMPLAINT  Manageable headaches, fatigue and nausea for a few days after each cycle of Avastin, currently resolved.    Dear Dr. Gregorio,    HISTORY OF PRESENT ILLNESS:   I saw Ms. Alvarez in follow up today in our medical oncology clinic. 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 She was initially diagnosed in , and you performed a successful resection. She subsequently underwent localized XRT (but no chemotherapy). She did very well until , when she developed a localized recurrence; and, again, you performed a successful resection (this time without adjuvant XRT). She was again doing well until late , when she started to develop some mild forgetfulness and headaches; and a repeat MRI showed evidence of recurrent disease again. This time, it was felt that repeat surgery was not in her best interest (at least at that time). She underwent Cyberknife treatment in early 2017 and tolerated it well. She was subsequently referred to our clinic to consider chemotherapy (Temodar). At the time of her initial appointment with us (on 2017), she was agreeable to this treatment (patient dose: 320 mg on days 1-5 of a 28-day cycle). She did overall well on this regimen for several months; however, unfortunately, repeat imaging in May 2017 showed evidence of disease reprogression. She underwent a repeat craniotomy for debulking in early summer 2017 and has been doing overall well since then on second-line palliative therapy with [id number] Avastin.    INTERIM HISTORY:  Ms. Alvarez returns to clinic today for follow up by herself. She began palliative [id number] Avastin on 2017, has received a total of eight (8) cycles  "to date and is overall still tolerating them well. She most recently underwent a repeat neurosurgery and MRI evaluation in mid-November 2017, which showed no evidence of disease progression. Meanwhile, she is being seen by a pain specialist in Dr. Gregorio' office who plans to perform a nerve stimulation trial early next week.    Past Medical History:   Diagnosis Date   • Anxiety and depression    • Arthritis     RIGHT FOOT \"DUE TO FOUR STRESS FRACTURES\"    • Brain tumor     SURGERY, CHEMO AND RADIATON    • Diabetes mellitus     DX'D TYPE II NIDDM APPROX 6 WEEKS AGO, \"CAUSED BY CHEMO\" CHECKS BS -150 IN AM -275 PM    • Eczema    • Frequent headaches    • GERD (gastroesophageal reflux disease)    • History of shingles     MULTIPLE OUTBREAKS--NO OPEN AREAS AT THIS TIME \"SHOW UP JUST ABOVE BUTTOCKS\"    • Hypertension     CONTROLLED WITH MEDS PER PT    • IBS (irritable bowel syndrome)    • Low back pain    • Nerve damage     from shingles   • PONV (postoperative nausea and vomiting)        Past Surgical History:   Procedure Laterality Date   • BREAST SURGERY      REDUCTION    • CHOLECYSTECTOMY     • CRANIOTOMY  [id number]   • CRANIOTOMY FOR TUMOR Right 6/21/2017    Procedure: CRANIOTOMY FOR TUMOR STEREOTACTIC WITH STEALTH; planned right frontal lobectomy for astrocytoma progression;  Surgeon: David Gregorio MD;  Location: [facility];  Service:    • ENDOMETRIAL ABLATION         Social History     Social History   • Marital status:      Spouse name: N/A   • Number of children: N/A   • Years of education: N/A     Occupational History   • Not on file.     Social History Main Topics   • Smoking status: Never Smoker   • Smokeless tobacco: Never Used   • Alcohol use No   • Drug use: No   • Sexual activity: Defer     Other Topics Concern   • Not on file     Social History Narrative       Family History   Problem Relation Age of Onset   • Hypertension Father      pulmonary       Allergies   Allergen Reactions "   • Morphine And Related Hallucinations   • Diamox [Acetazolamide] Rash       Current Outpatient Prescriptions   Medication Sig Dispense Refill   • ACCU-CHEK JACOB PLUS test strip USE BID  3   • ACCU-CHEK SOFTCLIX LANCETS lancets      • ARIPiprazole (ABILIFY) 5 MG tablet Take 5 mg by mouth Daily.     • clonazePAM (KlonoPIN) 1 MG tablet Take 1 mg by mouth 2 (Two) Times a Day As Needed.     • gabapentin (NEURONTIN) 800 MG tablet Take 800 mg by mouth 3 (Three) Times a Day.     • lidocaine-prilocaine (EMLA) 2.5-2.5 % cream Apply 1 hour prior to VAD access 30 g 5   • lisinopril-hydrochlorothiazide (PRINZIDE,ZESTORETIC) 20-12.5 MG per tablet Take 1 tablet by mouth Daily.     • ondansetron (ZOFRAN) 8 MG tablet Take 1 tablet by mouth Every 8 (Eight) Hours As Needed for Nausea or Vomiting. ODT if covered per insurance 30 tablet 5   • oxyCODONE-acetaminophen (PERCOCET) 5-325 MG per tablet Take 1 tablet by mouth Every 6 (Six) Hours As Needed for Moderate Pain (4-6). 60 tablet o   • sertraline (ZOLOFT) 100 MG tablet Take 150 mg by mouth Daily.     • SITagliptin (JANUVIA) 100 MG tablet Take 100 mg by mouth Daily.     • valACYclovir (VALTREX) 500 MG tablet Take 1,000 mg by mouth 2 (Two) Times a Day.     • zolpidem (AMBIEN) 5 MG tablet Take 5 mg by mouth Every Night.       No current facility-administered medications for this visit.      Facility-Administered Medications Ordered in Other Visits   Medication Dose Route Frequency Provider Last Rate Last Dose   • mupirocin (BACTROBAN) 2 % nasal ointment   Nasal BID Ирина Interiano PA-C           REVIEW OF SYSTEMS  CONSTITUTIONAL:  No fever, chills or night sweats. Intermittent fatigue.  EYES:  No blurry vision, diplopia or other vision changes.  ENT:  No hearing loss, nosebleeds or sore throat.  CARDIOVASCULAR:  No palpitations, arrhythmia, syncopal episodes or edema.  PULMONARY:  No hemoptysis, wheezing, chronic cough or shortness of breath.  GASTROINTESTINAL:  No constipation or  diarrhea.  No abdominal pain. Occasional nausea (though not as much as when she was on Temodar), controlled with prn antiemetics.  GENITOURINARY:  No hematuria, kidney stones or frequent urination.  MUSCULOSKELETAL:  Chronic back pains; nerve stimulation procedure planned next week.  INTEGUMENTARY: No rashes or pruritus.  ENDOCRINE:  No excessive thirst or hot flashes.  HEMATOLOGIC:  No history of free bleeding, spontaneous bleeding or clotting.  IMMUNOLOGIC:  No allergies or frequent infections.  NEUROLOGIC: Chronic nerve pain. Intermittent headaches, as per the HPI above.  PSYCHIATRIC:  No anxiety or depression.    PHYSICAL EXAMINATION    /95  Pulse 80  Temp 97.6 °F (36.4 °C) (Oral)   Resp 18  Wt 112 kg (247 lb 11.2 oz)  SpO2 97%  BMI 45.3 kg/m2    GENERAL:  A well-developed, well-nourished, obese, white female in no acute distress.  HEENT:  Pupils equally round and reactive to light.  Extraocular muscles intact.  CARDIOVASCULAR:  Regular rate and rhythm.  No murmurs, gallops or rubs.  LUNGS:  Clear to auscultation bilaterally.  ABDOMEN:  Soft, nontender, nondistended with positive bowel sounds.  : Deferred.  EXTREMITIES:  No clubbing, cyanosis or edema bilaterally.  SKIN:  No rashes or petechiae.  NEURO:  Cranial nerves grossly intact.  No focal deficits.  PSYCH:  Alert and oriented x3.    LABORATORY    Lab Results   Component Value Date    WBC 8.65 12/06/2017    WBC 8.89 12/06/2017    HGB 15.1 12/06/2017    HGB 15.3 12/06/2017    HCT 43.8 12/06/2017    HCT 43.7 12/06/2017    MCV 79.5 (L) 12/06/2017    MCV 79.2 (L) 12/06/2017     12/06/2017     12/06/2017    NEUTROABS 5.26 12/06/2017    NEUTROABS 5.50 12/06/2017       Lab Results   Component Value Date     12/06/2017    K 4.2 12/06/2017     12/06/2017    CO2 26.1 12/06/2017    BUN 11 12/06/2017    CREATININE 0.81 12/06/2017    GLUCOSE 205 (H) 12/06/2017    CALCIUM 9.3 12/06/2017    AST 39 (H) 11/08/2017    ALT 65 (H)  "11/08/2017    ALKPHOS 65 11/08/2017    BILITOT 0.6 11/08/2017    PROTEINTOT 7.3 11/08/2017    ALBUMIN 4.20 11/08/2017       CBC (05/31/2017): WBCs: 10.27; HgB: 15.8; Hct: 44.7; platelets: 289  CBC (05/03/2017): WBCs: 7.8; HgB: 16.3; Hct: 45.7; platelets: 341  CBC (03/23/2017): WBCs: 5.6; HgB: 15.2; Hct: 43.6; platelets: 249  CBC (03/13/2017): WBCs: 10.0; HgB: 15.7; Hct: 45.0; platelets: 314  CBC (02/23/2017): WBCs: 6.4; HgB: 15.4; Hct: 43.9; platelets: 248    IMAGING  MRI brain with and without contrast (06/13/2016):  Impression: There are bilateral frontal changes related to prior surgery, right greater than left. There is some associated gliosis and minimal contrast enhancement in the superior leftward aspect of the surgical \"bed\". Most importantly, there has been no change since 02/22/2016.    MRI cyberknife brain with contrast (12/28/2016):  Impression: Areas of increased blood flow in the right frontal lobe in areas of abnormal contrast enhancement and therefore the abnormality seen on prior MRI is highly suspicious for tumor.    MRI brain with and without contrast (02/28/2017):  Impression: Slight interval increase in the size of the area of abnormal contrast enhancement diffusely throughout the right frontal region. The amount of surrounding edema is also increased in the interval. Findings may be related to progression of disease however postradiation changes cannot be excluded. The edema extends into the right basal ganglia and parietal lobe. No new area of abnormal contrast enhancement identified. [Per a neurosurgery read and evaluation that day, the intensity of the enhancement is somewhat lessened, and the flare appears to be about the same.]    MRI brain with and without contrast (04/27/2017):  Impression: Increased cerebral blood volume in the area of abnormal contrast enhancement most consistent with neovascularity and tumor progression. The size and surrounding edema is stable.    MRI brain with and " without contrast (05/30/2017):  Impression:  1) Intense, enhancing lesion right frontal lobe with interval evidence of increasing mass effect and edema with increasing midline shift from right to left and increasing compression of the anterior horn right lateral ventricle. The size of the enhancing lesion is very slightly larger by careful comparison and measurement. The degree of overall edema in the right frontal lobe has increased and now crosses the midline to the left frontal lobe. There is midline shift right to left which has increased somewhat since previous studies.  2) Therefore, the findings in the right frontal lobe are slowly progressive by multiple parameters. A distant lesion is not identified elsewhere.    CT cerebral perfusion with and without contrast (05/30/2017):  Impression:  1) Large mass lesion right frontal lobe exhibiting marked increase in permeability. There is mass effect and edema diffusely in the right frontal lobe with a high grade defect of cerebral blood flow with sporadic areas of preserved cerebral blood volume.  2) There is marked delay in the mean transit time and time to drain in the right frontal tumor and mass region.  3) No other cerebral insults, ischemic abnormalities or parametric map abnormalities are identified elsewhere, outside of the large right frontal mass.    MRI brain with and without contrast (09/19/2017):  Impression:  1) Continued, interval decrease in right frontal lobe postoperative findings with overall decreased vasogenic edema and hemorrhage from prior. No acute hemorrhage or enhancing soft tissue focus to suggest residual enhancing tumor. No evidence for distant, enhancing lesion.  2) No acute intracranial pathology.    MRI brain with and without contrast (11/14/17)  Impression:  1. Complex septated cystic mass with perimeter enhancement right frontal  region surrounded by gliotic increased FLAIR and T2 signal radiating  into the contralateral left  frontal lobe and surrounding the mass in the  right frontal lobe. 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  2. The size of the right frontal cystic lesion, the degree of perimeter  enhancement and the overall degree of abnormal FLAIR and T2 signal in  the frontal lobes is stable without change or progression when compared  to previous studies of 09/19/2017.  3. Further, new or enhancing lesion elsewhere is not currently  identified. Therefore, the findings in the brain, more specifically in  the frontal lobes, are stable when compared to the most recent studies.    IMPRESSION AND PLAN  Ms. Alvarez is a 42 y.o., white female with:  1. Glioma: Initially diagnosed in 2007, with recurrences in 2014, late 2016, and, most recently, summer 2017. Now status post resection x 3 (in 2007, 2014 and 06/21/2017) and localized radiation x 2 (adjuvantly in 2007; and, most recently, a Cyberknife boost delivered in January 2017). Since a third resection was felt to not be in her best interest (at least at the time, in early 2017) and she completed localized XRT, we agreed with neurosurgery's recommendation to start chemotherapy. She began Temodar (patient dose: 320 mg daily on days 1-5 of a 28 day cycle) by late January 2017 and completed five (5), qmonthly cycles. She tolerated this regimen overall well, with some mild, and manageable nausea and increasing fatigue her only noticeable side effects. With this treatment, her CBCs remained unremarkable with no developing cytopenias. Unfortunately, despite this therapy, she developed reworsening headaches; and a repeat MRI in late May 2017 was consistent with reprogressive disease. As a result, she underwent a repeat craniotomy on 06/21/17. She tolerated this debulking surgery well, and she was subsequently started on second-line, palliative therapy with [id number] Avastin. She began this treatment on 08/16/2017, has received eight (8) cycles to date and continues to  tolerate this it overall very well. The most recent repeat MRI of the brain (performed on 11/14/2017 and summarized above) again shows no evidence of disease progression. We will proceed with this current treatment plan; although, due to her pending spinal procedure (see below), we will need to delay today's planned ninth cycle by a couple of weeks to make sure she recovers uneventfully (bevacizumab can lead to problems with wound healing). We will see her back in clinic in both two weeks (to make sure she is clear to resume her therapy) and ~six weeks (in mid-January 2018, on an Avastin treatment day and shortly after her next scheduled repeat MRI and neurosurgery evaluation).  2. Chronic back pain: As discussed above, pain management is planning a nerve stimulation trial/possible implantation next week. We will delay today's planned ninth cycle of Avastin by a couple of weeks and see her back in clinic at that time.   3. Nausea: Continue prn Zofran, compazine and dramamine. Continue to monitor.  The patient was in agreement with these plans.    It is a pleasure to participate in Ms. Devlin's care. Please do not hesitate to call with any questions or concerns that you may have.    A total of 30 minutes were spent coordinating this patient’s care in clinic today; 25 minutes were spent face-to-face with the patient, reviewing her interim medical history and counseling on the current treatment and followup plan. All questions were answered to her satisfaction.    FOLLOW UP  Delay today's planned 9th cycle of palliative, [id number] bevacizumab (Avastin) for 2 weeks. With pain management next week as planned. With neurosurgery in another ~6 weeks (mid-January 2018), as previously planned, with a repeat MRI of the brain. Return to our clinic in both 2 weeks and ~6 weeks (on Avastin treatment days).      This document was electronically signed by JEFFY Pinto MD December 6, 2017 12:25 PM      CC: David Gregorio,  MD Sarah Garcia, PA

## 2017-12-11 ENCOUNTER — OUTSIDE FACILITY SERVICE (OUTPATIENT)
Dept: PAIN MEDICINE | Facility: CLINIC | Age: 42
End: 2017-12-11

## 2017-12-11 PROCEDURE — 63650 IMPLANT NEUROELECTRODES: CPT | Performed by: ANESTHESIOLOGY

## 2017-12-11 PROCEDURE — 99152 MOD SED SAME PHYS/QHP 5/>YRS: CPT | Performed by: ANESTHESIOLOGY

## 2017-12-11 PROCEDURE — 95972 ALYS CPLX SP/PN NPGT W/PRGRM: CPT | Performed by: ANESTHESIOLOGY

## 2017-12-14 ENCOUNTER — OFFICE VISIT (OUTPATIENT)
Dept: PAIN MEDICINE | Facility: CLINIC | Age: 42
End: 2017-12-14

## 2017-12-14 VITALS
WEIGHT: 248 LBS | BODY MASS INDEX: 45.64 KG/M2 | RESPIRATION RATE: 20 BRPM | TEMPERATURE: 98.6 F | HEIGHT: 62 IN | OXYGEN SATURATION: 99 % | HEART RATE: 80 BPM

## 2017-12-14 DIAGNOSIS — E11.8 TYPE 2 DIABETES MELLITUS WITH COMPLICATION, WITHOUT LONG-TERM CURRENT USE OF INSULIN (HCC): ICD-10-CM

## 2017-12-14 DIAGNOSIS — F41.9 ANXIETY AND DEPRESSION: ICD-10-CM

## 2017-12-14 DIAGNOSIS — C71.9 GLIOMA (HCC): ICD-10-CM

## 2017-12-14 DIAGNOSIS — E66.01 MORBID OBESITY (HCC): ICD-10-CM

## 2017-12-14 DIAGNOSIS — B02.29 POSTHERPETIC NEURALGIA: Primary | ICD-10-CM

## 2017-12-14 DIAGNOSIS — F32.A ANXIETY AND DEPRESSION: ICD-10-CM

## 2017-12-14 DIAGNOSIS — Z95.828 PORTACATH IN PLACE: ICD-10-CM

## 2017-12-14 DIAGNOSIS — C71.9 ASTROCYTOMA (HCC): ICD-10-CM

## 2017-12-14 DIAGNOSIS — B02.29 POSTHERPETIC NEURALGIA: ICD-10-CM

## 2017-12-14 PROCEDURE — 99024 POSTOP FOLLOW-UP VISIT: CPT | Performed by: ANESTHESIOLOGY

## 2017-12-14 PROCEDURE — 95972 ALYS CPLX SP/PN NPGT W/PRGRM: CPT | Performed by: ANESTHESIOLOGY

## 2017-12-15 DIAGNOSIS — C71.9 GLIOMA (HCC): ICD-10-CM

## 2017-12-15 RX ORDER — SODIUM CHLORIDE 9 MG/ML
250 INJECTION, SOLUTION INTRAVENOUS ONCE
Status: CANCELLED | OUTPATIENT
Start: 2017-12-20

## 2017-12-15 RX ORDER — SODIUM CHLORIDE 9 MG/ML
250 INJECTION, SOLUTION INTRAVENOUS ONCE
Status: CANCELLED | OUTPATIENT
Start: 2018-01-01 | End: 2018-01-01

## 2017-12-20 ENCOUNTER — APPOINTMENT (OUTPATIENT)
Dept: ONCOLOGY | Facility: HOSPITAL | Age: 42
End: 2017-12-20

## 2017-12-22 ENCOUNTER — PREP FOR SURGERY (OUTPATIENT)
Dept: OTHER | Facility: HOSPITAL | Age: 42
End: 2017-12-22

## 2017-12-22 ENCOUNTER — OFFICE VISIT (OUTPATIENT)
Dept: NEUROSURGERY | Facility: CLINIC | Age: 42
End: 2017-12-22

## 2017-12-22 VITALS — TEMPERATURE: 97.4 F | WEIGHT: 247 LBS | BODY MASS INDEX: 45.45 KG/M2 | HEIGHT: 62 IN

## 2017-12-22 DIAGNOSIS — M54.41 CHRONIC BILATERAL LOW BACK PAIN WITH BILATERAL SCIATICA: Primary | ICD-10-CM

## 2017-12-22 DIAGNOSIS — G89.29 CHRONIC BILATERAL LOW BACK PAIN WITH BILATERAL SCIATICA: Primary | ICD-10-CM

## 2017-12-22 DIAGNOSIS — M54.42 CHRONIC LOW BACK PAIN WITH BILATERAL SCIATICA: Primary | ICD-10-CM

## 2017-12-22 DIAGNOSIS — M54.42 CHRONIC BILATERAL LOW BACK PAIN WITH BILATERAL SCIATICA: Primary | ICD-10-CM

## 2017-12-22 DIAGNOSIS — M54.41 CHRONIC LOW BACK PAIN WITH BILATERAL SCIATICA: Primary | ICD-10-CM

## 2017-12-22 DIAGNOSIS — G89.29 CHRONIC LOW BACK PAIN WITH BILATERAL SCIATICA: Primary | ICD-10-CM

## 2017-12-22 PROCEDURE — 99213 OFFICE O/P EST LOW 20 MIN: CPT | Performed by: NEUROLOGICAL SURGERY

## 2017-12-22 RX ORDER — SODIUM CHLORIDE 0.9 % (FLUSH) 0.9 %
1-10 SYRINGE (ML) INJECTION AS NEEDED
Status: CANCELLED | OUTPATIENT
Start: 2017-12-22

## 2017-12-22 RX ORDER — FAMOTIDINE 20 MG/1
20 TABLET, FILM COATED ORAL
Status: CANCELLED | OUTPATIENT
Start: 2017-12-22

## 2017-12-22 RX ORDER — CEFAZOLIN SODIUM 2 G/100ML
2 INJECTION, SOLUTION INTRAVENOUS ONCE
Status: CANCELLED | OUTPATIENT
Start: 2017-12-22 | End: 2017-12-22

## 2017-12-22 NOTE — H&P
Patient: Marcelina Alvarez  : 1975     Primary Care Provider: TAYLOR Heard     Requesting Provider: As above           History     Chief Complaint: Bilateral buttock and leg pain.     History of Present Illness: Ms. Alvarez is a 42 old woman well known to our service.  She's been treated and followed by my colleague Dr. Gregorio for low-grade primary brain tumors.  In addition to prior surgery she is now undergoing treatment with a Avastin.  Her last chemotherapy treatment was early in December of this year.  She has a many year history of a severe pain involving her buttocks that extends globally into her legs.  She really doesn't have much in the way of low back pain.  She's not sure what makes her symptoms better or worse.     Review of Systems   Constitutional: Negative for activity change, appetite change, chills, diaphoresis, fatigue, fever and unexpected weight change.   HENT: Negative for congestion, dental problem, drooling, ear discharge, ear pain, facial swelling, hearing loss, mouth sores, nosebleeds, postnasal drip, rhinorrhea, sinus pressure, sneezing, sore throat, tinnitus, trouble swallowing and voice change.    Eyes: Negative for photophobia, pain, discharge, redness, itching and visual disturbance.   Respiratory: Negative for apnea, cough, choking, chest tightness, shortness of breath, wheezing and stridor.    Cardiovascular: Negative for chest pain, palpitations and leg swelling.   Gastrointestinal: Negative for abdominal distention, abdominal pain, anal bleeding, blood in stool, constipation, diarrhea, nausea, rectal pain and vomiting.   Musculoskeletal: Positive for back pain. Negative for arthralgias, gait problem, joint swelling, myalgias, neck pain and neck stiffness.   Skin: Negative for color change, pallor, rash and wound.   Allergic/Immunologic: Negative for environmental allergies, food allergies and immunocompromised state.   Neurological: Positive for weakness and numbness.  "Negative for dizziness, tremors, seizures, syncope, facial asymmetry, speech difficulty, light-headedness and headaches.   Hematological: Negative for adenopathy. Does not bruise/bleed easily.   Psychiatric/Behavioral: Negative for agitation, behavioral problems, confusion, decreased concentration, dysphoric mood, self-injury, sleep disturbance and suicidal ideas. The patient is not nervous/anxious and is not hyperactive.          The patient's past medical history, past surgical history, family history, and social history have been reviewed at length in the electronic medical record.     Past Medical History:   Diagnosis Date   • Anxiety and depression    • Arthritis     RIGHT FOOT \"DUE TO FOUR STRESS FRACTURES\"    • Brain tumor     SURGERY, CHEMO AND RADIATON    • Diabetes mellitus     DX'D TYPE II NIDDM APPROX 6 WEEKS AGO, \"CAUSED BY CHEMO\" CHECKS BS -150 IN AM -275 PM    • Eczema    • Frequent headaches    • GERD (gastroesophageal reflux disease)    • History of shingles     MULTIPLE OUTBREAKS--NO OPEN AREAS AT THIS TIME \"SHOW UP JUST ABOVE BUTTOCKS\"    • Hypertension     CONTROLLED WITH MEDS PER PT    • IBS (irritable bowel syndrome)    • Low back pain    • Nerve damage     from shingles   • PONV (postoperative nausea and vomiting)      Past Surgical History:   Procedure Laterality Date   • BREAST SURGERY      REDUCTION    • CHOLECYSTECTOMY     • CRANIOTOMY FOR TUMOR Right 6/21/2017    Procedure: CRANIOTOMY FOR TUMOR STEREOTACTIC WITH STEALTH; planned right frontal lobectomy for astrocytoma progression;  Surgeon: David Gregorio MD;  Location: [facility];  Service:    • CRANIOTOMY FOR TUMOR  12/05/2014    CRANI FOR GLIOMA (Dr. Gregorio)   • CRANIOTOMY FOR TUMOR  08/31/2007    CRANI & EXCISION OF LOW GRADE GLIOMA & LUMBAR DRAIN (Dr. Gregorio)   • ENDOMETRIAL ABLATION       Family History   Problem Relation Age of Onset   • Hypertension Father      pulmonary     Social History     Social History   • " "Marital status:      Spouse name: N/A   • Number of children: N/A   • Years of education: N/A     Occupational History   • Not on file.     Social History Main Topics   • Smoking status: Never Smoker   • Smokeless tobacco: Never Used   • Alcohol use No   • Drug use: No   • Sexual activity: Defer     Other Topics Concern   • Not on file     Social History Narrative     Allergies   Allergen Reactions   • Morphine And Related Hallucinations   • Diamox [Acetazolamide] Rash       Physical Exam:   Temp 97.4 °F (36.3 °C)  Ht 157.5 cm (62\")  Wt 112 kg (247 lb)  BMI 45.18 kg/m2  MUSCULOSKELETAL:  Straight leg raising is negative.  Bashir's Sign is negative.  ROM in back is normal.  Tenderness in the back to palpation is not observed.  NEUROLOGICAL:  Strength is intact in the lower extremities to direct testing.  Muscle tone is normal throughout.  Station and gait are normal.  Sensation is intact to light touch testing throughout.  Deep tendon reflexes are 1+ and symmetrical.  Coordination is intact.        Medical Decision Making     Data Review:   Lumbar MRI is fairly unrevealing.  Thoracic MRI reveals some diffuse degenerative disc changes however her canal is capacious.     Diagnosis:   Bilateral lower extremity pain of uncertain etiology.     Treatment Options:   The patient has done a trial of a spinal cord stimulator which was quite helpful in terms of her pain.  She did have a fair amount of incisional pain related to the trial itself.  Dr. Heaton has requested [facility] spinal cord stimulator placement with the lead projecting to the level of the superior endplate of T8.  The nature of the procedure as well as the potential risks, complications, limitations, and alternatives to the procedure were discussed at length with the patient and the patient has agreed to proceed with surgery.  We will endeavor to set this up late the first week in January to try and give her a bit more time off of her a Avastin.  " Certainly with that medication she is at increased risk for wound healing issues.  Obviously an MRI compatible system will be required given the need for repeated MRI studies.          Diagnosis Plan   1. Chronic bilateral low back pain with bilateral sciatica

## 2017-12-22 NOTE — PROGRESS NOTES
Patient: Marcelina Alvarez  : 1975    Primary Care Provider: TAYLOR Heard    Requesting Provider: As above        History    Chief Complaint: Bilateral buttock and leg pain.    History of Present Illness: Ms. Alvarez is a 42 old woman well known to our service.  She's been treated and followed by my colleague Dr. Gregorio for low-grade primary brain tumors.  In addition to prior surgery she is now undergoing treatment with a Avastin.  Her last chemotherapy treatment was early in December of this year.  She has a many year history of a severe pain involving her buttocks that extends globally into her legs.  She really doesn't have much in the way of low back pain.  She's not sure what makes her symptoms better or worse.    Review of Systems   Constitutional: Negative for activity change, appetite change, chills, diaphoresis, fatigue, fever and unexpected weight change.   HENT: Negative for congestion, dental problem, drooling, ear discharge, ear pain, facial swelling, hearing loss, mouth sores, nosebleeds, postnasal drip, rhinorrhea, sinus pressure, sneezing, sore throat, tinnitus, trouble swallowing and voice change.    Eyes: Negative for photophobia, pain, discharge, redness, itching and visual disturbance.   Respiratory: Negative for apnea, cough, choking, chest tightness, shortness of breath, wheezing and stridor.    Cardiovascular: Negative for chest pain, palpitations and leg swelling.   Gastrointestinal: Negative for abdominal distention, abdominal pain, anal bleeding, blood in stool, constipation, diarrhea, nausea, rectal pain and vomiting.   Musculoskeletal: Positive for back pain. Negative for arthralgias, gait problem, joint swelling, myalgias, neck pain and neck stiffness.   Skin: Negative for color change, pallor, rash and wound.   Allergic/Immunologic: Negative for environmental allergies, food allergies and immunocompromised state.   Neurological: Positive for weakness and numbness. Negative  "for dizziness, tremors, seizures, syncope, facial asymmetry, speech difficulty, light-headedness and headaches.   Hematological: Negative for adenopathy. Does not bruise/bleed easily.   Psychiatric/Behavioral: Negative for agitation, behavioral problems, confusion, decreased concentration, dysphoric mood, self-injury, sleep disturbance and suicidal ideas. The patient is not nervous/anxious and is not hyperactive.        The patient's past medical history, past surgical history, family history, and social history have been reviewed at length in the electronic medical record.    Physical Exam:   Temp 97.4 °F (36.3 °C)  Ht 157.5 cm (62\")  Wt 112 kg (247 lb)  BMI 45.18 kg/m2  MUSCULOSKELETAL:  Straight leg raising is negative.  Bashir's Sign is negative.  ROM in back is normal.  Tenderness in the back to palpation is not observed.  NEUROLOGICAL:  Strength is intact in the lower extremities to direct testing.  Muscle tone is normal throughout.  Station and gait are normal.  Sensation is intact to light touch testing throughout.  Deep tendon reflexes are 1+ and symmetrical.  Coordination is intact.      Medical Decision Making    Data Review:   Lumbar MRI is fairly unrevealing.  Thoracic MRI reveals some diffuse degenerative disc changes however her canal is capacious.    Diagnosis:   Bilateral lower extremity pain of uncertain etiology.    Treatment Options:   The patient has done a trial of a spinal cord stimulator which was quite helpful in terms of her pain.  She did have a fair amount of incisional pain related to the trial itself.  Dr. Heaton has requested St. Crow spinal cord stimulator placement with the lead projecting to the level of the superior endplate of T8.  The nature of the procedure as well as the potential risks, complications, limitations, and alternatives to the procedure were discussed at length with the patient and the patient has agreed to proceed with surgery.  We will endeavor to set this " up late the first week in January to try and give her a bit more time off of her a Avastin.  Certainly with that medication she is at increased risk for wound healing issues.  Obviously an MRI compatible system will be required given the need for repeated MRI studies.       Diagnosis Plan   1. Chronic bilateral low back pain with bilateral sciatica             I, Dr. Lemons, personally performed the services described in the documentation, as scribed in my presence, and it is both accurate and complete.  Scribed for Cirilo Lemons MD by Marcin Oshea CMA on 12/22/2017 at 11:06 AM

## 2017-12-26 PROBLEM — M54.42 CHRONIC LOW BACK PAIN WITH BILATERAL SCIATICA: Status: ACTIVE | Noted: 2017-12-26

## 2017-12-26 PROBLEM — G89.29 CHRONIC LOW BACK PAIN WITH BILATERAL SCIATICA: Status: ACTIVE | Noted: 2017-12-26

## 2017-12-26 PROBLEM — M54.41 CHRONIC LOW BACK PAIN WITH BILATERAL SCIATICA: Status: ACTIVE | Noted: 2017-12-26

## 2018-01-01 ENCOUNTER — APPOINTMENT (OUTPATIENT)
Dept: MRI IMAGING | Facility: HOSPITAL | Age: 43
End: 2018-01-01
Attending: NEUROLOGICAL SURGERY

## 2018-01-01 ENCOUNTER — LAB (OUTPATIENT)
Dept: ONCOLOGY | Facility: CLINIC | Age: 43
End: 2018-01-01

## 2018-01-01 ENCOUNTER — APPOINTMENT (OUTPATIENT)
Dept: ONCOLOGY | Facility: HOSPITAL | Age: 43
End: 2018-01-01

## 2018-01-01 ENCOUNTER — DOCUMENTATION (OUTPATIENT)
Dept: ONCOLOGY | Facility: HOSPITAL | Age: 43
End: 2018-01-01

## 2018-01-01 ENCOUNTER — INFUSION (OUTPATIENT)
Dept: ONCOLOGY | Facility: HOSPITAL | Age: 43
End: 2018-01-01

## 2018-01-01 ENCOUNTER — OFFICE VISIT (OUTPATIENT)
Dept: ONCOLOGY | Facility: CLINIC | Age: 43
End: 2018-01-01

## 2018-01-01 ENCOUNTER — TREATMENT (OUTPATIENT)
Dept: ONCOLOGY | Facility: HOSPITAL | Age: 43
End: 2018-01-01

## 2018-01-01 ENCOUNTER — TREATMENT (OUTPATIENT)
Dept: ADMINISTRATIVE | Facility: HOSPITAL | Age: 43
End: 2018-01-01

## 2018-01-01 ENCOUNTER — APPOINTMENT (OUTPATIENT)
Dept: CT IMAGING | Facility: HOSPITAL | Age: 43
End: 2018-01-01

## 2018-01-01 ENCOUNTER — OFFICE VISIT (OUTPATIENT)
Dept: NEUROSURGERY | Facility: CLINIC | Age: 43
End: 2018-01-01

## 2018-01-01 ENCOUNTER — APPOINTMENT (OUTPATIENT)
Dept: DIABETES SERVICES | Facility: HOSPITAL | Age: 43
End: 2018-01-01
Attending: NEUROLOGICAL SURGERY

## 2018-01-01 ENCOUNTER — TELEPHONE (OUTPATIENT)
Dept: NEUROSURGERY | Facility: CLINIC | Age: 43
End: 2018-01-01

## 2018-01-01 ENCOUNTER — HOSPITAL ENCOUNTER (OUTPATIENT)
Dept: MRI IMAGING | Facility: HOSPITAL | Age: 43
Discharge: HOME | End: 2018-01-29
Attending: NEUROLOGICAL SURGERY

## 2018-01-01 ENCOUNTER — HOSPITAL ENCOUNTER (EMERGENCY)
Facility: HOSPITAL | Age: 43
Discharge: HOME | End: 2018-12-28
Attending: FAMILY MEDICINE | Admitting: FAMILY MEDICINE

## 2018-01-01 ENCOUNTER — HOSPITAL ENCOUNTER (OUTPATIENT)
Dept: MRI IMAGING | Facility: HOSPITAL | Age: 43
Discharge: HOME | End: 2018-08-07
Attending: NEUROLOGICAL SURGERY | Admitting: NEUROLOGICAL SURGERY

## 2018-01-01 ENCOUNTER — HOSPITAL ENCOUNTER (OUTPATIENT)
Dept: MRI IMAGING | Facility: HOSPITAL | Age: 43
Discharge: HOME | End: 2018-11-27
Attending: NEUROLOGICAL SURGERY | Admitting: NEUROLOGICAL SURGERY

## 2018-01-01 ENCOUNTER — HOSPITAL ENCOUNTER (OUTPATIENT)
Dept: MRI IMAGING | Facility: HOSPITAL | Age: 43
Discharge: HOME | End: 2018-10-02
Attending: NEUROLOGICAL SURGERY | Admitting: NEUROLOGICAL SURGERY

## 2018-01-01 ENCOUNTER — TELEPHONE (OUTPATIENT)
Dept: ONCOLOGY | Facility: HOSPITAL | Age: 43
End: 2018-01-01

## 2018-01-01 ENCOUNTER — HOSPITAL ENCOUNTER (OUTPATIENT)
Dept: MRI IMAGING | Facility: HOSPITAL | Age: 43
Discharge: HOME | End: 2018-04-03
Attending: NEUROLOGICAL SURGERY

## 2018-01-01 ENCOUNTER — OFFICE VISIT (OUTPATIENT)
Dept: PAIN MEDICINE | Facility: CLINIC | Age: 43
End: 2018-01-01

## 2018-01-01 ENCOUNTER — APPOINTMENT (OUTPATIENT)
Dept: GENERAL RADIOLOGY | Facility: HOSPITAL | Age: 43
End: 2018-01-01

## 2018-01-01 ENCOUNTER — HOSPITAL ENCOUNTER (OUTPATIENT)
Dept: MRI IMAGING | Facility: HOSPITAL | Age: 43
Discharge: HOME | End: 2018-04-23
Attending: NEUROLOGICAL SURGERY | Admitting: NEUROLOGICAL SURGERY

## 2018-01-01 ENCOUNTER — HOSPITAL ENCOUNTER (OUTPATIENT)
Dept: MRI IMAGING | Facility: HOSPITAL | Age: 43
End: 2018-01-01
Attending: NEUROLOGICAL SURGERY

## 2018-01-01 ENCOUNTER — SPECIALTY PHARMACY (OUTPATIENT)
Dept: PHARMACY | Facility: HOSPITAL | Age: 43
End: 2018-01-01

## 2018-01-01 ENCOUNTER — HOSPITAL ENCOUNTER (OUTPATIENT)
Dept: GENERAL RADIOLOGY | Facility: HOSPITAL | Age: 43
Discharge: HOME | End: 2018-02-22
Attending: INTERNAL MEDICINE | Admitting: INTERNAL MEDICINE

## 2018-01-01 VITALS
WEIGHT: 233.7 LBS | DIASTOLIC BLOOD PRESSURE: 90 MMHG | TEMPERATURE: 98 F | HEART RATE: 72 BPM | BODY MASS INDEX: 42.73 KG/M2 | RESPIRATION RATE: 20 BRPM | SYSTOLIC BLOOD PRESSURE: 136 MMHG | OXYGEN SATURATION: 97 %

## 2018-01-01 VITALS
TEMPERATURE: 97.6 F | WEIGHT: 244.4 LBS | RESPIRATION RATE: 20 BRPM | SYSTOLIC BLOOD PRESSURE: 129 MMHG | OXYGEN SATURATION: 98 % | DIASTOLIC BLOOD PRESSURE: 86 MMHG | BODY MASS INDEX: 44.7 KG/M2 | HEART RATE: 90 BPM

## 2018-01-01 VITALS
HEART RATE: 86 BPM | TEMPERATURE: 97.1 F | TEMPERATURE: 97.1 F | SYSTOLIC BLOOD PRESSURE: 118 MMHG | WEIGHT: 231.6 LBS | BODY MASS INDEX: 42.36 KG/M2 | DIASTOLIC BLOOD PRESSURE: 65 MMHG | BODY MASS INDEX: 42.36 KG/M2 | SYSTOLIC BLOOD PRESSURE: 118 MMHG | RESPIRATION RATE: 18 BRPM | WEIGHT: 231.6 LBS | DIASTOLIC BLOOD PRESSURE: 65 MMHG | HEART RATE: 86 BPM | OXYGEN SATURATION: 96 % | OXYGEN SATURATION: 96 % | RESPIRATION RATE: 18 BRPM

## 2018-01-01 VITALS
WEIGHT: 246.7 LBS | BODY MASS INDEX: 45.12 KG/M2 | DIASTOLIC BLOOD PRESSURE: 80 MMHG | OXYGEN SATURATION: 99 % | HEART RATE: 87 BPM | RESPIRATION RATE: 18 BRPM | TEMPERATURE: 97.2 F | SYSTOLIC BLOOD PRESSURE: 123 MMHG

## 2018-01-01 VITALS
SYSTOLIC BLOOD PRESSURE: 125 MMHG | TEMPERATURE: 97.3 F | BODY MASS INDEX: 40.67 KG/M2 | RESPIRATION RATE: 18 BRPM | WEIGHT: 221 LBS | DIASTOLIC BLOOD PRESSURE: 70 MMHG | OXYGEN SATURATION: 98 % | HEIGHT: 62 IN | HEART RATE: 80 BPM

## 2018-01-01 VITALS
DIASTOLIC BLOOD PRESSURE: 82 MMHG | TEMPERATURE: 98.2 F | HEIGHT: 62 IN | SYSTOLIC BLOOD PRESSURE: 118 MMHG | WEIGHT: 249 LBS | BODY MASS INDEX: 45.82 KG/M2

## 2018-01-01 VITALS
OXYGEN SATURATION: 100 % | SYSTOLIC BLOOD PRESSURE: 132 MMHG | HEART RATE: 87 BPM | DIASTOLIC BLOOD PRESSURE: 91 MMHG | WEIGHT: 246.4 LBS | RESPIRATION RATE: 20 BRPM | TEMPERATURE: 97 F | BODY MASS INDEX: 45.07 KG/M2

## 2018-01-01 VITALS
WEIGHT: 244.9 LBS | RESPIRATION RATE: 18 BRPM | WEIGHT: 248 LBS | OXYGEN SATURATION: 100 % | HEART RATE: 80 BPM | DIASTOLIC BLOOD PRESSURE: 89 MMHG | SYSTOLIC BLOOD PRESSURE: 132 MMHG | HEART RATE: 75 BPM | SYSTOLIC BLOOD PRESSURE: 147 MMHG | TEMPERATURE: 96.6 F | DIASTOLIC BLOOD PRESSURE: 83 MMHG | BODY MASS INDEX: 44.79 KG/M2 | RESPIRATION RATE: 18 BRPM | TEMPERATURE: 97.8 F | BODY MASS INDEX: 45.36 KG/M2 | OXYGEN SATURATION: 96 %

## 2018-01-01 VITALS
WEIGHT: 230.2 LBS | OXYGEN SATURATION: 97 % | RESPIRATION RATE: 18 BRPM | SYSTOLIC BLOOD PRESSURE: 131 MMHG | DIASTOLIC BLOOD PRESSURE: 74 MMHG | HEART RATE: 73 BPM | TEMPERATURE: 97.2 F | BODY MASS INDEX: 42.09 KG/M2

## 2018-01-01 VITALS
WEIGHT: 244.9 LBS | DIASTOLIC BLOOD PRESSURE: 89 MMHG | OXYGEN SATURATION: 97 % | RESPIRATION RATE: 18 BRPM | BODY MASS INDEX: 44.79 KG/M2 | HEART RATE: 74 BPM | HEART RATE: 80 BPM | SYSTOLIC BLOOD PRESSURE: 137 MMHG | WEIGHT: 237.8 LBS | BODY MASS INDEX: 43.49 KG/M2 | TEMPERATURE: 96.6 F | SYSTOLIC BLOOD PRESSURE: 132 MMHG | DIASTOLIC BLOOD PRESSURE: 89 MMHG | OXYGEN SATURATION: 96 % | RESPIRATION RATE: 18 BRPM | TEMPERATURE: 98.2 F

## 2018-01-01 VITALS
SYSTOLIC BLOOD PRESSURE: 116 MMHG | DIASTOLIC BLOOD PRESSURE: 81 MMHG | TEMPERATURE: 97 F | HEART RATE: 85 BPM | OXYGEN SATURATION: 99 % | WEIGHT: 245.6 LBS | RESPIRATION RATE: 18 BRPM | BODY MASS INDEX: 44.92 KG/M2

## 2018-01-01 VITALS
SYSTOLIC BLOOD PRESSURE: 141 MMHG | RESPIRATION RATE: 18 BRPM | OXYGEN SATURATION: 99 % | TEMPERATURE: 97 F | HEART RATE: 74 BPM | DIASTOLIC BLOOD PRESSURE: 87 MMHG | BODY MASS INDEX: 44.9 KG/M2 | WEIGHT: 245.5 LBS

## 2018-01-01 VITALS
TEMPERATURE: 96.8 F | HEART RATE: 91 BPM | WEIGHT: 246.7 LBS | DIASTOLIC BLOOD PRESSURE: 83 MMHG | BODY MASS INDEX: 45.12 KG/M2 | OXYGEN SATURATION: 99 % | SYSTOLIC BLOOD PRESSURE: 137 MMHG | RESPIRATION RATE: 18 BRPM

## 2018-01-01 VITALS
SYSTOLIC BLOOD PRESSURE: 122 MMHG | HEIGHT: 62 IN | BODY MASS INDEX: 44.9 KG/M2 | WEIGHT: 244 LBS | DIASTOLIC BLOOD PRESSURE: 100 MMHG | TEMPERATURE: 97.6 F

## 2018-01-01 VITALS
OXYGEN SATURATION: 98 % | SYSTOLIC BLOOD PRESSURE: 139 MMHG | TEMPERATURE: 97.3 F | RESPIRATION RATE: 18 BRPM | HEART RATE: 95 BPM | BODY MASS INDEX: 45.51 KG/M2 | RESPIRATION RATE: 18 BRPM | BODY MASS INDEX: 45.51 KG/M2 | SYSTOLIC BLOOD PRESSURE: 139 MMHG | WEIGHT: 248.8 LBS | TEMPERATURE: 97.3 F | OXYGEN SATURATION: 98 % | HEART RATE: 95 BPM | WEIGHT: 248.8 LBS | DIASTOLIC BLOOD PRESSURE: 73 MMHG | DIASTOLIC BLOOD PRESSURE: 73 MMHG

## 2018-01-01 VITALS
DIASTOLIC BLOOD PRESSURE: 86 MMHG | HEIGHT: 62 IN | HEART RATE: 89 BPM | WEIGHT: 247 LBS | SYSTOLIC BLOOD PRESSURE: 124 MMHG | BODY MASS INDEX: 45.45 KG/M2 | TEMPERATURE: 97.7 F

## 2018-01-01 VITALS
HEART RATE: 83 BPM | TEMPERATURE: 98.3 F | RESPIRATION RATE: 18 BRPM | WEIGHT: 248.4 LBS | SYSTOLIC BLOOD PRESSURE: 124 MMHG | BODY MASS INDEX: 45.71 KG/M2 | HEIGHT: 62 IN | OXYGEN SATURATION: 99 % | DIASTOLIC BLOOD PRESSURE: 80 MMHG

## 2018-01-01 VITALS
OXYGEN SATURATION: 98 % | TEMPERATURE: 98.2 F | BODY MASS INDEX: 45.82 KG/M2 | SYSTOLIC BLOOD PRESSURE: 132 MMHG | HEART RATE: 98 BPM | HEIGHT: 62 IN | DIASTOLIC BLOOD PRESSURE: 82 MMHG | WEIGHT: 249 LBS | RESPIRATION RATE: 16 BRPM

## 2018-01-01 VITALS
BODY MASS INDEX: 40.45 KG/M2 | OXYGEN SATURATION: 97 % | HEART RATE: 93 BPM | WEIGHT: 221.2 LBS | SYSTOLIC BLOOD PRESSURE: 127 MMHG | DIASTOLIC BLOOD PRESSURE: 90 MMHG | RESPIRATION RATE: 18 BRPM | TEMPERATURE: 97.3 F

## 2018-01-01 VITALS
SYSTOLIC BLOOD PRESSURE: 138 MMHG | TEMPERATURE: 97.1 F | DIASTOLIC BLOOD PRESSURE: 83 MMHG | WEIGHT: 244.6 LBS | RESPIRATION RATE: 20 BRPM | BODY MASS INDEX: 44.73 KG/M2 | HEART RATE: 70 BPM | OXYGEN SATURATION: 98 %

## 2018-01-01 VITALS
HEART RATE: 90 BPM | RESPIRATION RATE: 20 BRPM | SYSTOLIC BLOOD PRESSURE: 129 MMHG | TEMPERATURE: 97.6 F | WEIGHT: 244.4 LBS | BODY MASS INDEX: 44.7 KG/M2 | DIASTOLIC BLOOD PRESSURE: 86 MMHG | OXYGEN SATURATION: 98 %

## 2018-01-01 VITALS
RESPIRATION RATE: 18 BRPM | BODY MASS INDEX: 44.56 KG/M2 | HEART RATE: 77 BPM | OXYGEN SATURATION: 98 % | TEMPERATURE: 97.6 F | DIASTOLIC BLOOD PRESSURE: 93 MMHG | SYSTOLIC BLOOD PRESSURE: 129 MMHG | RESPIRATION RATE: 18 BRPM | HEART RATE: 89 BPM | HEART RATE: 89 BPM | WEIGHT: 243.6 LBS | BODY MASS INDEX: 45.2 KG/M2 | OXYGEN SATURATION: 98 % | SYSTOLIC BLOOD PRESSURE: 129 MMHG | TEMPERATURE: 97.6 F | DIASTOLIC BLOOD PRESSURE: 82 MMHG | DIASTOLIC BLOOD PRESSURE: 82 MMHG | SYSTOLIC BLOOD PRESSURE: 129 MMHG | RESPIRATION RATE: 18 BRPM | TEMPERATURE: 97.6 F | OXYGEN SATURATION: 98 % | BODY MASS INDEX: 44.56 KG/M2 | WEIGHT: 247.1 LBS | WEIGHT: 243.6 LBS

## 2018-01-01 VITALS
HEART RATE: 85 BPM | BODY MASS INDEX: 44.43 KG/M2 | WEIGHT: 243 LBS | DIASTOLIC BLOOD PRESSURE: 91 MMHG | OXYGEN SATURATION: 99 % | RESPIRATION RATE: 20 BRPM | SYSTOLIC BLOOD PRESSURE: 145 MMHG | TEMPERATURE: 97 F

## 2018-01-01 VITALS
HEART RATE: 80 BPM | RESPIRATION RATE: 18 BRPM | OXYGEN SATURATION: 98 % | BODY MASS INDEX: 44.62 KG/M2 | BODY MASS INDEX: 44.62 KG/M2 | DIASTOLIC BLOOD PRESSURE: 79 MMHG | RESPIRATION RATE: 18 BRPM | HEART RATE: 80 BPM | TEMPERATURE: 97.7 F | WEIGHT: 244 LBS | WEIGHT: 244 LBS | OXYGEN SATURATION: 98 % | DIASTOLIC BLOOD PRESSURE: 79 MMHG | SYSTOLIC BLOOD PRESSURE: 123 MMHG | SYSTOLIC BLOOD PRESSURE: 123 MMHG | TEMPERATURE: 97.7 F

## 2018-01-01 VITALS
TEMPERATURE: 97.7 F | OXYGEN SATURATION: 99 % | RESPIRATION RATE: 20 BRPM | SYSTOLIC BLOOD PRESSURE: 131 MMHG | HEART RATE: 86 BPM | BODY MASS INDEX: 45.4 KG/M2 | WEIGHT: 248.2 LBS | DIASTOLIC BLOOD PRESSURE: 87 MMHG

## 2018-01-01 VITALS
HEART RATE: 74 BPM | DIASTOLIC BLOOD PRESSURE: 89 MMHG | SYSTOLIC BLOOD PRESSURE: 137 MMHG | OXYGEN SATURATION: 97 % | TEMPERATURE: 98.2 F | RESPIRATION RATE: 18 BRPM

## 2018-01-01 VITALS
HEART RATE: 80 BPM | TEMPERATURE: 98 F | DIASTOLIC BLOOD PRESSURE: 86 MMHG | SYSTOLIC BLOOD PRESSURE: 121 MMHG | BODY MASS INDEX: 44.49 KG/M2 | WEIGHT: 243.3 LBS | RESPIRATION RATE: 18 BRPM | OXYGEN SATURATION: 98 %

## 2018-01-01 VITALS
DIASTOLIC BLOOD PRESSURE: 80 MMHG | SYSTOLIC BLOOD PRESSURE: 122 MMHG | TEMPERATURE: 98 F | BODY MASS INDEX: 45.64 KG/M2 | WEIGHT: 248 LBS | HEIGHT: 62 IN

## 2018-01-01 VITALS
SYSTOLIC BLOOD PRESSURE: 130 MMHG | WEIGHT: 234.2 LBS | TEMPERATURE: 96.5 F | HEIGHT: 62 IN | DIASTOLIC BLOOD PRESSURE: 80 MMHG | BODY MASS INDEX: 43.1 KG/M2

## 2018-01-01 VITALS
DIASTOLIC BLOOD PRESSURE: 84 MMHG | BODY MASS INDEX: 44.68 KG/M2 | RESPIRATION RATE: 18 BRPM | OXYGEN SATURATION: 98 % | TEMPERATURE: 97.3 F | HEART RATE: 72 BPM | WEIGHT: 244.3 LBS | SYSTOLIC BLOOD PRESSURE: 141 MMHG

## 2018-01-01 VITALS
BODY MASS INDEX: 44.73 KG/M2 | WEIGHT: 244.6 LBS | RESPIRATION RATE: 20 BRPM | SYSTOLIC BLOOD PRESSURE: 138 MMHG | DIASTOLIC BLOOD PRESSURE: 83 MMHG | OXYGEN SATURATION: 98 % | HEART RATE: 70 BPM | TEMPERATURE: 97.1 F

## 2018-01-01 VITALS
BODY MASS INDEX: 43.16 KG/M2 | HEART RATE: 70 BPM | OXYGEN SATURATION: 97 % | TEMPERATURE: 98.5 F | DIASTOLIC BLOOD PRESSURE: 87 MMHG | WEIGHT: 236 LBS | RESPIRATION RATE: 20 BRPM | SYSTOLIC BLOOD PRESSURE: 135 MMHG

## 2018-01-01 VITALS
BODY MASS INDEX: 40.87 KG/M2 | RESPIRATION RATE: 18 BRPM | SYSTOLIC BLOOD PRESSURE: 124 MMHG | TEMPERATURE: 97.6 F | OXYGEN SATURATION: 97 % | WEIGHT: 223.5 LBS | HEART RATE: 79 BPM | DIASTOLIC BLOOD PRESSURE: 85 MMHG

## 2018-01-01 VITALS
HEART RATE: 87 BPM | BODY MASS INDEX: 45.07 KG/M2 | SYSTOLIC BLOOD PRESSURE: 132 MMHG | TEMPERATURE: 97 F | OXYGEN SATURATION: 100 % | RESPIRATION RATE: 20 BRPM | WEIGHT: 246.4 LBS | DIASTOLIC BLOOD PRESSURE: 91 MMHG

## 2018-01-01 DIAGNOSIS — Z95.828 PORTACATH IN PLACE: ICD-10-CM

## 2018-01-01 DIAGNOSIS — C71.9 GLIOMA (HCC): Primary | ICD-10-CM

## 2018-01-01 DIAGNOSIS — R11.0 NAUSEA: ICD-10-CM

## 2018-01-01 DIAGNOSIS — Z95.828 PORT-A-CATH IN PLACE: ICD-10-CM

## 2018-01-01 DIAGNOSIS — C71.9 GLIOMA (HCC): ICD-10-CM

## 2018-01-01 DIAGNOSIS — M54.41 CHRONIC BILATERAL LOW BACK PAIN WITH BILATERAL SCIATICA: ICD-10-CM

## 2018-01-01 DIAGNOSIS — B02.29 POSTHERPETIC NEURALGIA: ICD-10-CM

## 2018-01-01 DIAGNOSIS — G89.29 CHRONIC BILATERAL LOW BACK PAIN WITH BILATERAL SCIATICA: ICD-10-CM

## 2018-01-01 DIAGNOSIS — F41.9 ANXIETY AND DEPRESSION: ICD-10-CM

## 2018-01-01 DIAGNOSIS — J32.9 CHRONIC SINUSITIS, UNSPECIFIED LOCATION: ICD-10-CM

## 2018-01-01 DIAGNOSIS — E11.8 TYPE 2 DIABETES MELLITUS WITH COMPLICATION, WITHOUT LONG-TERM CURRENT USE OF INSULIN (HCC): ICD-10-CM

## 2018-01-01 DIAGNOSIS — N39.0 URINARY TRACT INFECTION WITH HEMATURIA, SITE UNSPECIFIED: Primary | ICD-10-CM

## 2018-01-01 DIAGNOSIS — C71.9 ASTROCYTOMA (HCC): Primary | ICD-10-CM

## 2018-01-01 DIAGNOSIS — C71.1 MALIGNANT NEOPLASM OF FRONTAL LOBE (HCC): Primary | ICD-10-CM

## 2018-01-01 DIAGNOSIS — C71.9 MALIGNANT NEOPLASM OF BRAIN, UNSPECIFIED LOCATION (HCC): Primary | ICD-10-CM

## 2018-01-01 DIAGNOSIS — F32.A ANXIETY AND DEPRESSION: ICD-10-CM

## 2018-01-01 DIAGNOSIS — E66.01 MORBID OBESITY (HCC): ICD-10-CM

## 2018-01-01 DIAGNOSIS — B02.29 POSTHERPETIC NEURALGIA: Primary | ICD-10-CM

## 2018-01-01 DIAGNOSIS — G89.29 CHRONIC BILATERAL LOW BACK PAIN WITH BILATERAL SCIATICA: Primary | ICD-10-CM

## 2018-01-01 DIAGNOSIS — M54.42 CHRONIC BILATERAL LOW BACK PAIN WITH BILATERAL SCIATICA: Primary | ICD-10-CM

## 2018-01-01 DIAGNOSIS — C71.9 ASTROCYTOMA (HCC): ICD-10-CM

## 2018-01-01 DIAGNOSIS — M54.42 CHRONIC BILATERAL LOW BACK PAIN WITH BILATERAL SCIATICA: ICD-10-CM

## 2018-01-01 DIAGNOSIS — R31.9 URINARY TRACT INFECTION WITH HEMATURIA, SITE UNSPECIFIED: Primary | ICD-10-CM

## 2018-01-01 DIAGNOSIS — R29.6 FALLING: ICD-10-CM

## 2018-01-01 DIAGNOSIS — R51.9 NONINTRACTABLE HEADACHE, UNSPECIFIED CHRONICITY PATTERN, UNSPECIFIED HEADACHE TYPE: ICD-10-CM

## 2018-01-01 DIAGNOSIS — M54.41 CHRONIC BILATERAL LOW BACK PAIN WITH BILATERAL SCIATICA: Primary | ICD-10-CM

## 2018-01-01 DIAGNOSIS — Z96.89 S/P INSERTION OF SPINAL CORD STIMULATOR: ICD-10-CM

## 2018-01-01 DIAGNOSIS — T82.868D THROMBOSIS INVOLVING VASCULAR DEVICE, SUBSEQUENT ENCOUNTER: Primary | ICD-10-CM

## 2018-01-01 DIAGNOSIS — Z45.42 ENCOUNTER FOR ADJUSTMENT AND MANAGEMENT OF NEUROPACEMAKER (BRAIN) (PERIPHERAL NERVE) (SPINAL CORD): ICD-10-CM

## 2018-01-01 DIAGNOSIS — E86.0 DEHYDRATION: ICD-10-CM

## 2018-01-01 DIAGNOSIS — R19.7 DIARRHEA, UNSPECIFIED TYPE: ICD-10-CM

## 2018-01-01 LAB
ALBUMIN SERPL-MCNC: 4 G/DL (ref 3.5–5)
ALBUMIN SERPL-MCNC: 4 G/DL (ref 3.5–5)
ALBUMIN SERPL-MCNC: 4.1 G/DL (ref 3.5–5)
ALBUMIN SERPL-MCNC: 4.3 G/DL (ref 3.5–5)
ALBUMIN SERPL-MCNC: 4.3 G/DL (ref 3.5–5)
ALBUMIN SERPL-MCNC: 4.4 G/DL (ref 3.5–5)
ALBUMIN SERPL-MCNC: 4.4 G/DL (ref 3.5–5)
ALBUMIN/GLOB SERPL: 1.3 G/DL (ref 1.5–2.5)
ALBUMIN/GLOB SERPL: 1.3 G/DL (ref 1.5–2.5)
ALBUMIN/GLOB SERPL: 1.4 G/DL (ref 1.5–2.5)
ALBUMIN/GLOB SERPL: 1.5 G/DL (ref 1.5–2.5)
ALBUMIN/GLOB SERPL: 1.5 G/DL (ref 1.5–2.5)
ALBUMIN/GLOB SERPL: 1.6 G/DL (ref 1.5–2.5)
ALBUMIN/GLOB SERPL: 1.6 G/DL (ref 1.5–2.5)
ALP SERPL-CCNC: 57 U/L (ref 35–104)
ALP SERPL-CCNC: 57 U/L (ref 35–104)
ALP SERPL-CCNC: 58 U/L (ref 35–104)
ALP SERPL-CCNC: 59 U/L (ref 35–104)
ALP SERPL-CCNC: 62 U/L (ref 35–104)
ALP SERPL-CCNC: 67 U/L (ref 35–104)
ALP SERPL-CCNC: 67 U/L (ref 35–104)
ALT SERPL W P-5'-P-CCNC: 103 U/L (ref 10–36)
ALT SERPL W P-5'-P-CCNC: 50 U/L (ref 10–36)
ALT SERPL W P-5'-P-CCNC: 55 U/L (ref 10–36)
ALT SERPL W P-5'-P-CCNC: 56 U/L (ref 10–36)
ALT SERPL W P-5'-P-CCNC: 59 U/L (ref 10–36)
ALT SERPL W P-5'-P-CCNC: 60 U/L (ref 10–36)
ALT SERPL W P-5'-P-CCNC: 61 U/L (ref 10–36)
ALT SERPL W P-5'-P-CCNC: 65 U/L (ref 10–36)
ALT SERPL W P-5'-P-CCNC: 70 U/L (ref 10–36)
ANION GAP SERPL CALCULATED.3IONS-SCNC: 10.1 MMOL/L (ref 3.6–11.2)
ANION GAP SERPL CALCULATED.3IONS-SCNC: 10.1 MMOL/L (ref 3.6–11.2)
ANION GAP SERPL CALCULATED.3IONS-SCNC: 10.2 MMOL/L (ref 3.6–11.2)
ANION GAP SERPL CALCULATED.3IONS-SCNC: 10.9 MMOL/L (ref 3.6–11.2)
ANION GAP SERPL CALCULATED.3IONS-SCNC: 11.7 MMOL/L (ref 3.6–11.2)
ANION GAP SERPL CALCULATED.3IONS-SCNC: 12.8 MMOL/L (ref 3.6–11.2)
ANION GAP SERPL CALCULATED.3IONS-SCNC: 5.7 MMOL/L (ref 3.6–11.2)
ANION GAP SERPL CALCULATED.3IONS-SCNC: 6 MMOL/L (ref 3.6–11.2)
ANION GAP SERPL CALCULATED.3IONS-SCNC: 6.9 MMOL/L (ref 3.6–11.2)
ANION GAP SERPL CALCULATED.3IONS-SCNC: 7.1 MMOL/L (ref 3.6–11.2)
ANION GAP SERPL CALCULATED.3IONS-SCNC: 7.7 MMOL/L (ref 3.6–11.2)
ANION GAP SERPL CALCULATED.3IONS-SCNC: 7.8 MMOL/L (ref 3.6–11.2)
ANION GAP SERPL CALCULATED.3IONS-SCNC: 7.8 MMOL/L (ref 3.6–11.2)
ANION GAP SERPL CALCULATED.3IONS-SCNC: 7.9 MMOL/L (ref 3.6–11.2)
ANION GAP SERPL CALCULATED.3IONS-SCNC: 8.1 MMOL/L (ref 3.6–11.2)
ANION GAP SERPL CALCULATED.3IONS-SCNC: 8.2 MMOL/L (ref 3.6–11.2)
ANION GAP SERPL CALCULATED.3IONS-SCNC: 8.4 MMOL/L (ref 3.6–11.2)
ANION GAP SERPL CALCULATED.3IONS-SCNC: 8.5 MMOL/L (ref 3.6–11.2)
ANION GAP SERPL CALCULATED.3IONS-SCNC: 8.8 MMOL/L (ref 3.6–11.2)
ANION GAP SERPL CALCULATED.3IONS-SCNC: 8.9 MMOL/L (ref 3.6–11.2)
ANION GAP SERPL CALCULATED.3IONS-SCNC: 8.9 MMOL/L (ref 3.6–11.2)
ANION GAP SERPL CALCULATED.3IONS-SCNC: 9.1 MMOL/L (ref 3.6–11.2)
ANION GAP SERPL CALCULATED.3IONS-SCNC: 9.4 MMOL/L (ref 3.6–11.2)
ANION GAP SERPL CALCULATED.3IONS-SCNC: 9.5 MMOL/L (ref 3.6–11.2)
APTT PPP: 25.7 SECONDS (ref 23.8–36.1)
AST SERPL-CCNC: 30 U/L (ref 10–30)
AST SERPL-CCNC: 33 U/L (ref 10–30)
AST SERPL-CCNC: 36 U/L (ref 10–30)
AST SERPL-CCNC: 37 U/L (ref 10–30)
AST SERPL-CCNC: 38 U/L (ref 10–30)
AST SERPL-CCNC: 42 U/L (ref 10–30)
AST SERPL-CCNC: 42 U/L (ref 10–30)
AST SERPL-CCNC: 43 U/L (ref 10–30)
AST SERPL-CCNC: 73 U/L (ref 10–30)
BACTERIA SPEC AEROBE CULT: NORMAL
BACTERIA UR QL AUTO: ABNORMAL /HPF
BACTERIA UR QL AUTO: ABNORMAL /HPF
BASOPHILS # BLD AUTO: 0.01 10*3/MM3 (ref 0–0.3)
BASOPHILS # BLD AUTO: 0.02 10*3/MM3 (ref 0–0.3)
BASOPHILS # BLD AUTO: 0.03 10*3/MM3 (ref 0–0.3)
BASOPHILS # BLD AUTO: 0.04 10*3/MM3 (ref 0–0.3)
BASOPHILS # BLD AUTO: 0.05 10*3/MM3 (ref 0–0.3)
BASOPHILS # BLD AUTO: 0.06 10*3/MM3 (ref 0–0.3)
BASOPHILS # BLD AUTO: 0.07 10*3/MM3 (ref 0–0.3)
BASOPHILS # BLD AUTO: 0.08 10*3/MM3 (ref 0–0.3)
BASOPHILS NFR BLD AUTO: 0.1 % (ref 0–2)
BASOPHILS NFR BLD AUTO: 0.2 % (ref 0–2)
BASOPHILS NFR BLD AUTO: 0.3 % (ref 0–2)
BASOPHILS NFR BLD AUTO: 0.4 % (ref 0–2)
BASOPHILS NFR BLD AUTO: 0.5 % (ref 0–2)
BASOPHILS NFR BLD AUTO: 0.6 % (ref 0–2)
BASOPHILS NFR BLD AUTO: 0.6 % (ref 0–2)
BASOPHILS NFR BLD AUTO: 0.7 % (ref 0–2)
BASOPHILS NFR BLD AUTO: 1.1 % (ref 0–2)
BILIRUB SERPL-MCNC: 0.4 MG/DL (ref 0.2–1.8)
BILIRUB SERPL-MCNC: 0.4 MG/DL (ref 0.2–1.8)
BILIRUB SERPL-MCNC: 0.5 MG/DL (ref 0.2–1.8)
BILIRUB SERPL-MCNC: 0.6 MG/DL (ref 0.2–1.8)
BILIRUB UR QL STRIP: ABNORMAL
BILIRUB UR QL STRIP: NEGATIVE
BNP SERPL-MCNC: 8 PG/ML (ref 0–100)
BUN BLD-MCNC: 11 MG/DL (ref 7–21)
BUN BLD-MCNC: 12 MG/DL (ref 7–21)
BUN BLD-MCNC: 13 MG/DL (ref 7–21)
BUN BLD-MCNC: 13 MG/DL (ref 7–21)
BUN BLD-MCNC: 14 MG/DL (ref 7–21)
BUN BLD-MCNC: 15 MG/DL (ref 7–21)
BUN BLD-MCNC: 16 MG/DL (ref 7–21)
BUN BLD-MCNC: 17 MG/DL (ref 7–21)
BUN BLD-MCNC: 17 MG/DL (ref 7–21)
BUN BLD-MCNC: 19 MG/DL (ref 7–21)
BUN/CREAT SERPL: 12.4 (ref 7–25)
BUN/CREAT SERPL: 12.5 (ref 7–25)
BUN/CREAT SERPL: 12.6 (ref 7–25)
BUN/CREAT SERPL: 12.9 (ref 7–25)
BUN/CREAT SERPL: 13.3 (ref 7–25)
BUN/CREAT SERPL: 13.5 (ref 7–25)
BUN/CREAT SERPL: 13.8 (ref 7–25)
BUN/CREAT SERPL: 14.3 (ref 7–25)
BUN/CREAT SERPL: 14.4 (ref 7–25)
BUN/CREAT SERPL: 14.8 (ref 7–25)
BUN/CREAT SERPL: 15 (ref 7–25)
BUN/CREAT SERPL: 15.1 (ref 7–25)
BUN/CREAT SERPL: 15.2 (ref 7–25)
BUN/CREAT SERPL: 15.9 (ref 7–25)
BUN/CREAT SERPL: 16.5 (ref 7–25)
BUN/CREAT SERPL: 16.7 (ref 7–25)
BUN/CREAT SERPL: 16.7 (ref 7–25)
BUN/CREAT SERPL: 17 (ref 7–25)
BUN/CREAT SERPL: 17.5 (ref 7–25)
BUN/CREAT SERPL: 17.5 (ref 7–25)
BUN/CREAT SERPL: 17.6 (ref 7–25)
BUN/CREAT SERPL: 18.1 (ref 7–25)
BUN/CREAT SERPL: 18.3 (ref 7–25)
BUN/CREAT SERPL: 21.6 (ref 7–25)
CALCIUM SPEC-SCNC: 10 MG/DL (ref 7.7–10)
CALCIUM SPEC-SCNC: 9 MG/DL (ref 7.7–10)
CALCIUM SPEC-SCNC: 9.1 MG/DL (ref 7.7–10)
CALCIUM SPEC-SCNC: 9.2 MG/DL (ref 7.7–10)
CALCIUM SPEC-SCNC: 9.3 MG/DL (ref 7.7–10)
CALCIUM SPEC-SCNC: 9.4 MG/DL (ref 7.7–10)
CALCIUM SPEC-SCNC: 9.5 MG/DL (ref 7.7–10)
CALCIUM SPEC-SCNC: 9.6 MG/DL (ref 7.7–10)
CALCIUM SPEC-SCNC: 9.7 MG/DL (ref 7.7–10)
CALCIUM SPEC-SCNC: 9.8 MG/DL (ref 7.7–10)
CALCIUM SPEC-SCNC: 9.8 MG/DL (ref 7.7–10)
CALCIUM SPEC-SCNC: 9.9 MG/DL (ref 7.7–10)
CHLORIDE SERPL-SCNC: 100 MMOL/L (ref 99–112)
CHLORIDE SERPL-SCNC: 102 MMOL/L (ref 99–112)
CHLORIDE SERPL-SCNC: 103 MMOL/L (ref 99–112)
CHLORIDE SERPL-SCNC: 104 MMOL/L (ref 99–112)
CHLORIDE SERPL-SCNC: 105 MMOL/L (ref 99–112)
CHLORIDE SERPL-SCNC: 105 MMOL/L (ref 99–112)
CHLORIDE SERPL-SCNC: 106 MMOL/L (ref 99–112)
CHLORIDE SERPL-SCNC: 107 MMOL/L (ref 99–112)
CHLORIDE SERPL-SCNC: 108 MMOL/L (ref 99–112)
CHLORIDE SERPL-SCNC: 110 MMOL/L (ref 99–112)
CHLORIDE SERPL-SCNC: 111 MMOL/L (ref 99–112)
CHLORIDE SERPL-SCNC: 112 MMOL/L (ref 99–112)
CHLORIDE SERPL-SCNC: 113 MMOL/L (ref 99–112)
CHLORIDE SERPL-SCNC: 115 MMOL/L (ref 99–112)
CHLORIDE SERPL-SCNC: 99 MMOL/L (ref 99–112)
CLARITY UR: ABNORMAL
CLARITY UR: CLEAR
CO2 SERPL-SCNC: 18.2 MMOL/L (ref 24.3–31.9)
CO2 SERPL-SCNC: 21.1 MMOL/L (ref 24.3–31.9)
CO2 SERPL-SCNC: 21.5 MMOL/L (ref 24.3–31.9)
CO2 SERPL-SCNC: 21.8 MMOL/L (ref 24.3–31.9)
CO2 SERPL-SCNC: 22.2 MMOL/L (ref 24.3–31.9)
CO2 SERPL-SCNC: 22.3 MMOL/L (ref 24.3–31.9)
CO2 SERPL-SCNC: 22.9 MMOL/L (ref 24.3–31.9)
CO2 SERPL-SCNC: 23.1 MMOL/L (ref 24.3–31.9)
CO2 SERPL-SCNC: 23.6 MMOL/L (ref 24.3–31.9)
CO2 SERPL-SCNC: 23.8 MMOL/L (ref 24.3–31.9)
CO2 SERPL-SCNC: 23.9 MMOL/L (ref 24.3–31.9)
CO2 SERPL-SCNC: 23.9 MMOL/L (ref 24.3–31.9)
CO2 SERPL-SCNC: 24 MMOL/L (ref 24.3–31.9)
CO2 SERPL-SCNC: 24.1 MMOL/L (ref 24.3–31.9)
CO2 SERPL-SCNC: 24.2 MMOL/L (ref 24.3–31.9)
CO2 SERPL-SCNC: 24.5 MMOL/L (ref 24.3–31.9)
CO2 SERPL-SCNC: 24.6 MMOL/L (ref 24.3–31.9)
CO2 SERPL-SCNC: 24.9 MMOL/L (ref 24.3–31.9)
CO2 SERPL-SCNC: 24.9 MMOL/L (ref 24.3–31.9)
CO2 SERPL-SCNC: 25.3 MMOL/L (ref 24.3–31.9)
CO2 SERPL-SCNC: 26.2 MMOL/L (ref 24.3–31.9)
CO2 SERPL-SCNC: 26.3 MMOL/L (ref 24.3–31.9)
COLOR UR: ABNORMAL
COLOR UR: YELLOW
CREAT BLD-MCNC: 0.8 MG/DL (ref 0.43–1.29)
CREAT BLD-MCNC: 0.82 MG/DL (ref 0.43–1.29)
CREAT BLD-MCNC: 0.83 MG/DL (ref 0.43–1.29)
CREAT BLD-MCNC: 0.84 MG/DL (ref 0.43–1.29)
CREAT BLD-MCNC: 0.85 MG/DL (ref 0.43–1.29)
CREAT BLD-MCNC: 0.87 MG/DL (ref 0.43–1.29)
CREAT BLD-MCNC: 0.87 MG/DL (ref 0.43–1.29)
CREAT BLD-MCNC: 0.88 MG/DL (ref 0.43–1.29)
CREAT BLD-MCNC: 0.89 MG/DL (ref 0.43–1.29)
CREAT BLD-MCNC: 0.89 MG/DL (ref 0.43–1.29)
CREAT BLD-MCNC: 0.9 MG/DL (ref 0.43–1.29)
CREAT BLD-MCNC: 0.91 MG/DL (ref 0.43–1.29)
CREAT BLD-MCNC: 0.92 MG/DL (ref 0.43–1.29)
CREAT BLD-MCNC: 0.93 MG/DL (ref 0.43–1.29)
CREAT BLD-MCNC: 0.93 MG/DL (ref 0.43–1.29)
CREAT BLD-MCNC: 0.96 MG/DL (ref 0.43–1.29)
CREAT BLD-MCNC: 0.97 MG/DL (ref 0.43–1.29)
CREAT BLD-MCNC: 1 MG/DL (ref 0.43–1.29)
CREAT BLD-MCNC: 1 MG/DL (ref 0.43–1.29)
D-LACTATE SERPL-SCNC: 2 MMOL/L (ref 0.5–2)
DEPRECATED RDW RBC AUTO: 42.4 FL (ref 37–54)
DEPRECATED RDW RBC AUTO: 42.5 FL (ref 37–54)
DEPRECATED RDW RBC AUTO: 42.7 FL (ref 37–54)
DEPRECATED RDW RBC AUTO: 42.7 FL (ref 37–54)
DEPRECATED RDW RBC AUTO: 42.9 FL (ref 37–54)
DEPRECATED RDW RBC AUTO: 42.9 FL (ref 37–54)
DEPRECATED RDW RBC AUTO: 43 FL (ref 37–54)
DEPRECATED RDW RBC AUTO: 43 FL (ref 37–54)
DEPRECATED RDW RBC AUTO: 43.2 FL (ref 37–54)
DEPRECATED RDW RBC AUTO: 43.3 FL (ref 37–54)
DEPRECATED RDW RBC AUTO: 43.3 FL (ref 37–54)
DEPRECATED RDW RBC AUTO: 43.4 FL (ref 37–54)
DEPRECATED RDW RBC AUTO: 43.5 FL (ref 37–54)
DEPRECATED RDW RBC AUTO: 43.9 FL (ref 37–54)
DEPRECATED RDW RBC AUTO: 44.6 FL (ref 37–54)
DEPRECATED RDW RBC AUTO: 45.9 FL (ref 37–54)
DEPRECATED RDW RBC AUTO: 46.8 FL (ref 37–54)
DEPRECATED RDW RBC AUTO: 48.8 FL (ref 37–54)
DEPRECATED RDW RBC AUTO: 49 FL (ref 37–54)
DEPRECATED RDW RBC AUTO: 49.4 FL (ref 37–54)
DEPRECATED RDW RBC AUTO: 49.5 FL (ref 37–54)
DEPRECATED RDW RBC AUTO: 49.8 FL (ref 37–54)
DEPRECATED RDW RBC AUTO: 50.3 FL (ref 37–54)
DEPRECATED RDW RBC AUTO: 53.7 FL (ref 37–54)
EOSINOPHIL # BLD AUTO: 0 10*3/MM3 (ref 0–0.7)
EOSINOPHIL # BLD AUTO: 0 10*3/MM3 (ref 0–0.7)
EOSINOPHIL # BLD AUTO: 0.01 10*3/MM3 (ref 0–0.7)
EOSINOPHIL # BLD AUTO: 0.01 10*3/MM3 (ref 0–0.7)
EOSINOPHIL # BLD AUTO: 0.14 10*3/MM3 (ref 0–0.7)
EOSINOPHIL # BLD AUTO: 0.15 10*3/MM3 (ref 0–0.7)
EOSINOPHIL # BLD AUTO: 0.16 10*3/MM3 (ref 0–0.7)
EOSINOPHIL # BLD AUTO: 0.21 10*3/MM3 (ref 0–0.7)
EOSINOPHIL # BLD AUTO: 0.21 10*3/MM3 (ref 0–0.7)
EOSINOPHIL # BLD AUTO: 0.22 10*3/MM3 (ref 0–0.7)
EOSINOPHIL # BLD AUTO: 0.23 10*3/MM3 (ref 0–0.7)
EOSINOPHIL # BLD AUTO: 0.25 10*3/MM3 (ref 0–0.7)
EOSINOPHIL # BLD AUTO: 0.25 10*3/MM3 (ref 0–0.7)
EOSINOPHIL # BLD AUTO: 0.26 10*3/MM3 (ref 0–0.7)
EOSINOPHIL # BLD AUTO: 0.26 10*3/MM3 (ref 0–0.7)
EOSINOPHIL # BLD AUTO: 0.28 10*3/MM3 (ref 0–0.7)
EOSINOPHIL # BLD AUTO: 0.29 10*3/MM3 (ref 0–0.7)
EOSINOPHIL # BLD AUTO: 0.31 10*3/MM3 (ref 0–0.7)
EOSINOPHIL # BLD AUTO: 0.31 10*3/MM3 (ref 0–0.7)
EOSINOPHIL # BLD AUTO: 0.32 10*3/MM3 (ref 0–0.7)
EOSINOPHIL # BLD AUTO: 0.43 10*3/MM3 (ref 0–0.7)
EOSINOPHIL NFR BLD AUTO: 0 % (ref 0–5)
EOSINOPHIL NFR BLD AUTO: 0 % (ref 0–5)
EOSINOPHIL NFR BLD AUTO: 0.1 % (ref 0–5)
EOSINOPHIL NFR BLD AUTO: 0.1 % (ref 0–5)
EOSINOPHIL NFR BLD AUTO: 1.4 % (ref 0–5)
EOSINOPHIL NFR BLD AUTO: 2 % (ref 0–5)
EOSINOPHIL NFR BLD AUTO: 2.1 % (ref 0–5)
EOSINOPHIL NFR BLD AUTO: 2.2 % (ref 0–5)
EOSINOPHIL NFR BLD AUTO: 2.5 % (ref 0–5)
EOSINOPHIL NFR BLD AUTO: 2.6 % (ref 0–5)
EOSINOPHIL NFR BLD AUTO: 2.6 % (ref 0–5)
EOSINOPHIL NFR BLD AUTO: 2.9 % (ref 0–5)
EOSINOPHIL NFR BLD AUTO: 2.9 % (ref 0–5)
EOSINOPHIL NFR BLD AUTO: 3 % (ref 0–5)
EOSINOPHIL NFR BLD AUTO: 3.1 % (ref 0–5)
EOSINOPHIL NFR BLD AUTO: 3.1 % (ref 0–5)
EOSINOPHIL NFR BLD AUTO: 3.2 % (ref 0–5)
EOSINOPHIL NFR BLD AUTO: 3.4 % (ref 0–5)
EOSINOPHIL NFR BLD AUTO: 3.7 % (ref 0–5)
EOSINOPHIL NFR BLD AUTO: 3.9 % (ref 0–5)
EOSINOPHIL NFR BLD AUTO: 3.9 % (ref 0–5)
EOSINOPHIL NFR BLD AUTO: 4.1 % (ref 0–5)
ERYTHROCYTE [DISTWIDTH] IN BLOOD BY AUTOMATED COUNT: 14.6 % (ref 11.5–14.5)
ERYTHROCYTE [DISTWIDTH] IN BLOOD BY AUTOMATED COUNT: 14.7 % (ref 11.5–14.5)
ERYTHROCYTE [DISTWIDTH] IN BLOOD BY AUTOMATED COUNT: 14.7 % (ref 11.5–14.5)
ERYTHROCYTE [DISTWIDTH] IN BLOOD BY AUTOMATED COUNT: 14.8 % (ref 11.5–14.5)
ERYTHROCYTE [DISTWIDTH] IN BLOOD BY AUTOMATED COUNT: 14.9 % (ref 11.5–14.5)
ERYTHROCYTE [DISTWIDTH] IN BLOOD BY AUTOMATED COUNT: 15.1 % (ref 11.5–14.5)
ERYTHROCYTE [DISTWIDTH] IN BLOOD BY AUTOMATED COUNT: 15.1 % (ref 11.5–14.5)
ERYTHROCYTE [DISTWIDTH] IN BLOOD BY AUTOMATED COUNT: 15.4 % (ref 11.5–14.5)
ERYTHROCYTE [DISTWIDTH] IN BLOOD BY AUTOMATED COUNT: 15.6 % (ref 11.5–14.5)
ERYTHROCYTE [DISTWIDTH] IN BLOOD BY AUTOMATED COUNT: 15.6 % (ref 11.5–14.5)
ERYTHROCYTE [DISTWIDTH] IN BLOOD BY AUTOMATED COUNT: 15.9 % (ref 11.5–14.5)
ERYTHROCYTE [DISTWIDTH] IN BLOOD BY AUTOMATED COUNT: 16.5 % (ref 11.5–14.5)
ERYTHROCYTE [DISTWIDTH] IN BLOOD BY AUTOMATED COUNT: 16.8 % (ref 11.5–14.5)
ERYTHROCYTE [DISTWIDTH] IN BLOOD BY AUTOMATED COUNT: 17.3 % (ref 11.5–14.5)
ERYTHROCYTE [SEDIMENTATION RATE] IN BLOOD: 7 MM/HR (ref 0–20)
GFR SERPL CREATININE-BSD FRML MDRD: 60 ML/MIN/1.73
GFR SERPL CREATININE-BSD FRML MDRD: 60 ML/MIN/1.73
GFR SERPL CREATININE-BSD FRML MDRD: 62 ML/MIN/1.73
GFR SERPL CREATININE-BSD FRML MDRD: 63 ML/MIN/1.73
GFR SERPL CREATININE-BSD FRML MDRD: 65 ML/MIN/1.73
GFR SERPL CREATININE-BSD FRML MDRD: 65 ML/MIN/1.73
GFR SERPL CREATININE-BSD FRML MDRD: 66 ML/MIN/1.73
GFR SERPL CREATININE-BSD FRML MDRD: 67 ML/MIN/1.73
GFR SERPL CREATININE-BSD FRML MDRD: 68 ML/MIN/1.73
GFR SERPL CREATININE-BSD FRML MDRD: 69 ML/MIN/1.73
GFR SERPL CREATININE-BSD FRML MDRD: 69 ML/MIN/1.73
GFR SERPL CREATININE-BSD FRML MDRD: 70 ML/MIN/1.73
GFR SERPL CREATININE-BSD FRML MDRD: 71 ML/MIN/1.73
GFR SERPL CREATININE-BSD FRML MDRD: 71 ML/MIN/1.73
GFR SERPL CREATININE-BSD FRML MDRD: 73 ML/MIN/1.73
GFR SERPL CREATININE-BSD FRML MDRD: 74 ML/MIN/1.73
GFR SERPL CREATININE-BSD FRML MDRD: 75 ML/MIN/1.73
GFR SERPL CREATININE-BSD FRML MDRD: 76 ML/MIN/1.73
GFR SERPL CREATININE-BSD FRML MDRD: 78 ML/MIN/1.73
GLOBULIN UR ELPH-MCNC: 2.6 GM/DL
GLOBULIN UR ELPH-MCNC: 2.7 GM/DL
GLOBULIN UR ELPH-MCNC: 2.7 GM/DL
GLOBULIN UR ELPH-MCNC: 2.8 GM/DL
GLOBULIN UR ELPH-MCNC: 2.9 GM/DL
GLOBULIN UR ELPH-MCNC: 3.1 GM/DL
GLOBULIN UR ELPH-MCNC: 3.1 GM/DL
GLOBULIN UR ELPH-MCNC: 3.2 GM/DL
GLOBULIN UR ELPH-MCNC: 3.2 GM/DL
GLUCOSE BLD-MCNC: 115 MG/DL (ref 70–110)
GLUCOSE BLD-MCNC: 139 MG/DL (ref 70–110)
GLUCOSE BLD-MCNC: 144 MG/DL (ref 70–110)
GLUCOSE BLD-MCNC: 144 MG/DL (ref 70–110)
GLUCOSE BLD-MCNC: 146 MG/DL (ref 70–110)
GLUCOSE BLD-MCNC: 154 MG/DL (ref 70–110)
GLUCOSE BLD-MCNC: 156 MG/DL (ref 70–110)
GLUCOSE BLD-MCNC: 157 MG/DL (ref 70–110)
GLUCOSE BLD-MCNC: 158 MG/DL (ref 70–110)
GLUCOSE BLD-MCNC: 162 MG/DL (ref 70–110)
GLUCOSE BLD-MCNC: 163 MG/DL (ref 70–110)
GLUCOSE BLD-MCNC: 184 MG/DL (ref 70–110)
GLUCOSE BLD-MCNC: 190 MG/DL (ref 70–110)
GLUCOSE BLD-MCNC: 199 MG/DL (ref 70–110)
GLUCOSE BLD-MCNC: 201 MG/DL (ref 70–110)
GLUCOSE BLD-MCNC: 228 MG/DL (ref 70–110)
GLUCOSE BLD-MCNC: 234 MG/DL (ref 70–110)
GLUCOSE BLD-MCNC: 238 MG/DL (ref 70–110)
GLUCOSE BLD-MCNC: 243 MG/DL (ref 70–110)
GLUCOSE BLD-MCNC: 243 MG/DL (ref 70–110)
GLUCOSE BLD-MCNC: 273 MG/DL (ref 70–110)
GLUCOSE BLD-MCNC: 289 MG/DL (ref 70–110)
GLUCOSE BLD-MCNC: 317 MG/DL (ref 70–110)
GLUCOSE BLD-MCNC: 321 MG/DL (ref 70–110)
GLUCOSE UR STRIP-MCNC: ABNORMAL MG/DL
GLUCOSE UR STRIP-MCNC: NEGATIVE MG/DL
HCT VFR BLD AUTO: 42.7 % (ref 37–47)
HCT VFR BLD AUTO: 43.7 % (ref 37–47)
HCT VFR BLD AUTO: 44.3 % (ref 37–47)
HCT VFR BLD AUTO: 44.5 % (ref 37–47)
HCT VFR BLD AUTO: 44.8 % (ref 37–47)
HCT VFR BLD AUTO: 45.2 % (ref 37–47)
HCT VFR BLD AUTO: 45.2 % (ref 37–47)
HCT VFR BLD AUTO: 45.4 % (ref 37–47)
HCT VFR BLD AUTO: 45.6 % (ref 37–47)
HCT VFR BLD AUTO: 45.6 % (ref 37–47)
HCT VFR BLD AUTO: 46 % (ref 37–47)
HCT VFR BLD AUTO: 46.1 % (ref 37–47)
HCT VFR BLD AUTO: 46.4 % (ref 37–47)
HCT VFR BLD AUTO: 46.5 % (ref 37–47)
HCT VFR BLD AUTO: 46.8 % (ref 37–47)
HCT VFR BLD AUTO: 47 % (ref 37–47)
HCT VFR BLD AUTO: 47.3 % (ref 37–47)
HCT VFR BLD AUTO: 47.9 % (ref 37–47)
HCT VFR BLD AUTO: 48.2 % (ref 37–47)
HCT VFR BLD AUTO: 51.3 % (ref 37–47)
HCT VFR BLD AUTO: 52.9 % (ref 37–47)
HGB BLD-MCNC: 14.7 G/DL (ref 12–16)
HGB BLD-MCNC: 14.8 G/DL (ref 12–16)
HGB BLD-MCNC: 14.9 G/DL (ref 12–16)
HGB BLD-MCNC: 15 G/DL (ref 12–16)
HGB BLD-MCNC: 15.1 G/DL (ref 12–16)
HGB BLD-MCNC: 15.2 G/DL (ref 12–16)
HGB BLD-MCNC: 15.4 G/DL (ref 12–16)
HGB BLD-MCNC: 15.4 G/DL (ref 12–16)
HGB BLD-MCNC: 15.5 G/DL (ref 12–16)
HGB BLD-MCNC: 15.6 G/DL (ref 12–16)
HGB BLD-MCNC: 15.8 G/DL (ref 12–16)
HGB BLD-MCNC: 15.9 G/DL (ref 12–16)
HGB BLD-MCNC: 16 G/DL (ref 12–16)
HGB BLD-MCNC: 16.2 G/DL (ref 12–16)
HGB BLD-MCNC: 16.3 G/DL (ref 12–16)
HGB BLD-MCNC: 17 G/DL (ref 12–16)
HGB BLD-MCNC: 17.5 G/DL (ref 12–16)
HGB BLD-MCNC: 18.3 G/DL (ref 12–16)
HGB UR QL STRIP.AUTO: ABNORMAL
HGB UR QL STRIP.AUTO: ABNORMAL
HGB UR QL STRIP.AUTO: NEGATIVE
HYALINE CASTS UR QL AUTO: ABNORMAL /LPF
HYALINE CASTS UR QL AUTO: ABNORMAL /LPF
IMM GRANULOCYTES # BLD AUTO: 0.01 10*3/MM3 (ref 0–0.03)
IMM GRANULOCYTES # BLD AUTO: 0.03 10*3/MM3 (ref 0–0.03)
IMM GRANULOCYTES # BLD AUTO: 0.03 10*3/MM3 (ref 0–0.03)
IMM GRANULOCYTES # BLD: 0.03 10*3/MM3 (ref 0–0.03)
IMM GRANULOCYTES # BLD: 0.04 10*3/MM3 (ref 0–0.03)
IMM GRANULOCYTES # BLD: 0.04 10*3/MM3 (ref 0–0.03)
IMM GRANULOCYTES # BLD: 0.05 10*3/MM3 (ref 0–0.03)
IMM GRANULOCYTES # BLD: 0.06 10*3/MM3 (ref 0–0.03)
IMM GRANULOCYTES # BLD: 0.07 10*3/MM3 (ref 0–0.03)
IMM GRANULOCYTES # BLD: 0.09 10*3/MM3 (ref 0–0.03)
IMM GRANULOCYTES NFR BLD AUTO: 0.1 % (ref 0–0.5)
IMM GRANULOCYTES NFR BLD AUTO: 0.4 % (ref 0–0.5)
IMM GRANULOCYTES NFR BLD AUTO: 0.4 % (ref 0–0.5)
IMM GRANULOCYTES NFR BLD: 0.3 % (ref 0–0.5)
IMM GRANULOCYTES NFR BLD: 0.4 % (ref 0–0.5)
IMM GRANULOCYTES NFR BLD: 0.5 % (ref 0–0.5)
IMM GRANULOCYTES NFR BLD: 0.6 % (ref 0–0.5)
IMM GRANULOCYTES NFR BLD: 0.7 % (ref 0–0.5)
IMM GRANULOCYTES NFR BLD: 0.7 % (ref 0–0.5)
IMM GRANULOCYTES NFR BLD: 0.8 % (ref 0–0.5)
IMM GRANULOCYTES NFR BLD: 0.9 % (ref 0–0.5)
IMM GRANULOCYTES NFR BLD: 0.9 % (ref 0–0.5)
IMM GRANULOCYTES NFR BLD: 1 % (ref 0–0.5)
INR PPP: 0.97 (ref 0.9–1.1)
KETONES UR QL STRIP: ABNORMAL
KETONES UR QL STRIP: NEGATIVE
LEUKOCYTE ESTERASE UR QL STRIP.AUTO: ABNORMAL
LEUKOCYTE ESTERASE UR QL STRIP.AUTO: NEGATIVE
LYMPHOCYTES # BLD AUTO: 1.89 10*3/MM3 (ref 1–3)
LYMPHOCYTES # BLD AUTO: 2.06 10*3/MM3 (ref 1–3)
LYMPHOCYTES # BLD AUTO: 2.13 10*3/MM3 (ref 1–3)
LYMPHOCYTES # BLD AUTO: 2.19 10*3/MM3 (ref 1–3)
LYMPHOCYTES # BLD AUTO: 2.28 10*3/MM3 (ref 1–3)
LYMPHOCYTES # BLD AUTO: 2.38 10*3/MM3 (ref 1–3)
LYMPHOCYTES # BLD AUTO: 2.38 10*3/MM3 (ref 1–3)
LYMPHOCYTES # BLD AUTO: 2.42 10*3/MM3 (ref 1–3)
LYMPHOCYTES # BLD AUTO: 2.43 10*3/MM3 (ref 1–3)
LYMPHOCYTES # BLD AUTO: 2.47 10*3/MM3 (ref 1–3)
LYMPHOCYTES # BLD AUTO: 2.54 10*3/MM3 (ref 1–3)
LYMPHOCYTES # BLD AUTO: 2.56 10*3/MM3 (ref 1–3)
LYMPHOCYTES # BLD AUTO: 2.58 10*3/MM3 (ref 1–3)
LYMPHOCYTES # BLD AUTO: 2.61 10*3/MM3 (ref 1–3)
LYMPHOCYTES # BLD AUTO: 2.63 10*3/MM3 (ref 1–3)
LYMPHOCYTES # BLD AUTO: 2.69 10*3/MM3 (ref 1–3)
LYMPHOCYTES # BLD AUTO: 2.74 10*3/MM3 (ref 1–3)
LYMPHOCYTES # BLD AUTO: 2.76 10*3/MM3 (ref 1–3)
LYMPHOCYTES # BLD AUTO: 2.81 10*3/MM3 (ref 1–3)
LYMPHOCYTES # BLD AUTO: 2.83 10*3/MM3 (ref 1–3)
LYMPHOCYTES # BLD AUTO: 2.9 10*3/MM3 (ref 1–3)
LYMPHOCYTES # BLD AUTO: 2.96 10*3/MM3 (ref 1–3)
LYMPHOCYTES # BLD AUTO: 3.01 10*3/MM3 (ref 1–3)
LYMPHOCYTES # BLD AUTO: 3.36 10*3/MM3 (ref 1–3)
LYMPHOCYTES NFR BLD AUTO: 22.2 % (ref 21–51)
LYMPHOCYTES NFR BLD AUTO: 24.6 % (ref 21–51)
LYMPHOCYTES NFR BLD AUTO: 24.7 % (ref 21–51)
LYMPHOCYTES NFR BLD AUTO: 24.8 % (ref 21–51)
LYMPHOCYTES NFR BLD AUTO: 25.4 % (ref 21–51)
LYMPHOCYTES NFR BLD AUTO: 28.7 % (ref 21–51)
LYMPHOCYTES NFR BLD AUTO: 28.8 % (ref 21–51)
LYMPHOCYTES NFR BLD AUTO: 28.9 % (ref 21–51)
LYMPHOCYTES NFR BLD AUTO: 29.3 % (ref 21–51)
LYMPHOCYTES NFR BLD AUTO: 30 % (ref 21–51)
LYMPHOCYTES NFR BLD AUTO: 30.3 % (ref 21–51)
LYMPHOCYTES NFR BLD AUTO: 30.9 % (ref 21–51)
LYMPHOCYTES NFR BLD AUTO: 31.3 % (ref 21–51)
LYMPHOCYTES NFR BLD AUTO: 32.1 % (ref 21–51)
LYMPHOCYTES NFR BLD AUTO: 32.3 % (ref 21–51)
LYMPHOCYTES NFR BLD AUTO: 32.3 % (ref 21–51)
LYMPHOCYTES NFR BLD AUTO: 33 % (ref 21–51)
LYMPHOCYTES NFR BLD AUTO: 33.4 % (ref 21–51)
LYMPHOCYTES NFR BLD AUTO: 34 % (ref 21–51)
LYMPHOCYTES NFR BLD AUTO: 36.1 % (ref 21–51)
LYMPHOCYTES NFR BLD AUTO: 36.5 % (ref 21–51)
LYMPHOCYTES NFR BLD AUTO: 37.9 % (ref 21–51)
LYMPHOCYTES NFR BLD AUTO: 38.1 % (ref 21–51)
LYMPHOCYTES NFR BLD AUTO: 40.7 % (ref 21–51)
MCH RBC QN AUTO: 27.4 PG (ref 27–33)
MCH RBC QN AUTO: 27.5 PG (ref 27–33)
MCH RBC QN AUTO: 27.6 PG (ref 27–33)
MCH RBC QN AUTO: 27.7 PG (ref 27–33)
MCH RBC QN AUTO: 27.7 PG (ref 27–33)
MCH RBC QN AUTO: 27.8 PG (ref 27–33)
MCH RBC QN AUTO: 27.9 PG (ref 27–33)
MCH RBC QN AUTO: 27.9 PG (ref 27–33)
MCH RBC QN AUTO: 28 PG (ref 27–33)
MCH RBC QN AUTO: 28.2 PG (ref 27–33)
MCH RBC QN AUTO: 28.2 PG (ref 27–33)
MCH RBC QN AUTO: 28.6 PG (ref 27–33)
MCH RBC QN AUTO: 28.9 PG (ref 27–33)
MCH RBC QN AUTO: 29 PG (ref 27–33)
MCH RBC QN AUTO: 30.1 PG (ref 27–33)
MCH RBC QN AUTO: 30.2 PG (ref 27–33)
MCHC RBC AUTO-ENTMCNC: 32.8 G/DL (ref 33–37)
MCHC RBC AUTO-ENTMCNC: 33 G/DL (ref 33–37)
MCHC RBC AUTO-ENTMCNC: 33.3 G/DL (ref 33–37)
MCHC RBC AUTO-ENTMCNC: 33.6 G/DL (ref 33–37)
MCHC RBC AUTO-ENTMCNC: 33.6 G/DL (ref 33–37)
MCHC RBC AUTO-ENTMCNC: 33.8 G/DL (ref 33–37)
MCHC RBC AUTO-ENTMCNC: 33.9 G/DL (ref 33–37)
MCHC RBC AUTO-ENTMCNC: 34 G/DL (ref 33–37)
MCHC RBC AUTO-ENTMCNC: 34.1 G/DL (ref 33–37)
MCHC RBC AUTO-ENTMCNC: 34.1 G/DL (ref 33–37)
MCHC RBC AUTO-ENTMCNC: 34.2 G/DL (ref 33–37)
MCHC RBC AUTO-ENTMCNC: 34.3 G/DL (ref 33–37)
MCHC RBC AUTO-ENTMCNC: 34.4 G/DL (ref 33–37)
MCHC RBC AUTO-ENTMCNC: 34.5 G/DL (ref 33–37)
MCHC RBC AUTO-ENTMCNC: 34.6 G/DL (ref 33–37)
MCHC RBC AUTO-ENTMCNC: 34.7 G/DL (ref 33–37)
MCHC RBC AUTO-ENTMCNC: 34.8 G/DL (ref 33–37)
MCHC RBC AUTO-ENTMCNC: 35.3 G/DL (ref 33–37)
MCV RBC AUTO: 79.9 FL (ref 80–94)
MCV RBC AUTO: 80.2 FL (ref 80–94)
MCV RBC AUTO: 80.4 FL (ref 80–94)
MCV RBC AUTO: 80.6 FL (ref 80–94)
MCV RBC AUTO: 80.7 FL (ref 80–94)
MCV RBC AUTO: 80.8 FL (ref 80–94)
MCV RBC AUTO: 81 FL (ref 80–94)
MCV RBC AUTO: 81 FL (ref 80–94)
MCV RBC AUTO: 81.1 FL (ref 80–94)
MCV RBC AUTO: 81.2 FL (ref 80–94)
MCV RBC AUTO: 81.5 FL (ref 80–94)
MCV RBC AUTO: 81.8 FL (ref 80–94)
MCV RBC AUTO: 81.9 FL (ref 80–94)
MCV RBC AUTO: 82.5 FL (ref 80–94)
MCV RBC AUTO: 82.8 FL (ref 80–94)
MCV RBC AUTO: 83 FL (ref 80–94)
MCV RBC AUTO: 83.1 FL (ref 80–94)
MCV RBC AUTO: 85.1 FL (ref 80–94)
MCV RBC AUTO: 87.3 FL (ref 80–94)
MCV RBC AUTO: 87.8 FL (ref 80–94)
MCV RBC AUTO: 88 FL (ref 80–94)
MCV RBC AUTO: 88.3 FL (ref 80–94)
MONOCYTES # BLD AUTO: 0.32 10*3/MM3 (ref 0.1–0.9)
MONOCYTES # BLD AUTO: 0.41 10*3/MM3 (ref 0.1–0.9)
MONOCYTES # BLD AUTO: 0.44 10*3/MM3 (ref 0.1–0.9)
MONOCYTES # BLD AUTO: 0.46 10*3/MM3 (ref 0.1–0.9)
MONOCYTES # BLD AUTO: 0.48 10*3/MM3 (ref 0.1–0.9)
MONOCYTES # BLD AUTO: 0.51 10*3/MM3 (ref 0.1–0.9)
MONOCYTES # BLD AUTO: 0.52 10*3/MM3 (ref 0.1–0.9)
MONOCYTES # BLD AUTO: 0.52 10*3/MM3 (ref 0.1–0.9)
MONOCYTES # BLD AUTO: 0.53 10*3/MM3 (ref 0.1–0.9)
MONOCYTES # BLD AUTO: 0.54 10*3/MM3 (ref 0.1–0.9)
MONOCYTES # BLD AUTO: 0.56 10*3/MM3 (ref 0.1–0.9)
MONOCYTES # BLD AUTO: 0.57 10*3/MM3 (ref 0.1–0.9)
MONOCYTES # BLD AUTO: 0.6 10*3/MM3 (ref 0.1–0.9)
MONOCYTES # BLD AUTO: 0.65 10*3/MM3 (ref 0.1–0.9)
MONOCYTES # BLD AUTO: 0.65 10*3/MM3 (ref 0.1–0.9)
MONOCYTES # BLD AUTO: 0.68 10*3/MM3 (ref 0.1–0.9)
MONOCYTES # BLD AUTO: 0.7 10*3/MM3 (ref 0.1–0.9)
MONOCYTES # BLD AUTO: 0.75 10*3/MM3 (ref 0.1–0.9)
MONOCYTES # BLD AUTO: 0.84 10*3/MM3 (ref 0.1–0.9)
MONOCYTES NFR BLD AUTO: 3.8 % (ref 0–10)
MONOCYTES NFR BLD AUTO: 5.2 % (ref 0–10)
MONOCYTES NFR BLD AUTO: 5.4 % (ref 0–10)
MONOCYTES NFR BLD AUTO: 5.5 % (ref 0–10)
MONOCYTES NFR BLD AUTO: 5.6 % (ref 0–10)
MONOCYTES NFR BLD AUTO: 5.8 % (ref 0–10)
MONOCYTES NFR BLD AUTO: 5.9 % (ref 0–10)
MONOCYTES NFR BLD AUTO: 6 % (ref 0–10)
MONOCYTES NFR BLD AUTO: 6.3 % (ref 0–10)
MONOCYTES NFR BLD AUTO: 6.4 % (ref 0–10)
MONOCYTES NFR BLD AUTO: 6.5 % (ref 0–10)
MONOCYTES NFR BLD AUTO: 6.7 % (ref 0–10)
MONOCYTES NFR BLD AUTO: 7.1 % (ref 0–10)
MONOCYTES NFR BLD AUTO: 7.1 % (ref 0–10)
MONOCYTES NFR BLD AUTO: 7.2 % (ref 0–10)
MONOCYTES NFR BLD AUTO: 7.3 % (ref 0–10)
MONOCYTES NFR BLD AUTO: 7.6 % (ref 0–10)
MONOCYTES NFR BLD AUTO: 8 % (ref 0–10)
MONOCYTES NFR BLD AUTO: 8.2 % (ref 0–10)
MONOCYTES NFR BLD AUTO: 8.3 % (ref 0–10)
NEUTROPHILS # BLD AUTO: 3.72 10*3/MM3 (ref 1.4–6.5)
NEUTROPHILS # BLD AUTO: 3.83 10*3/MM3 (ref 1.4–6.5)
NEUTROPHILS # BLD AUTO: 3.86 10*3/MM3 (ref 1.4–6.5)
NEUTROPHILS # BLD AUTO: 3.87 10*3/MM3 (ref 1.4–6.5)
NEUTROPHILS # BLD AUTO: 3.91 10*3/MM3 (ref 1.4–6.5)
NEUTROPHILS # BLD AUTO: 3.99 10*3/MM3 (ref 1.4–6.5)
NEUTROPHILS # BLD AUTO: 4.06 10*3/MM3 (ref 1.4–6.5)
NEUTROPHILS # BLD AUTO: 4.06 10*3/MM3 (ref 1.4–6.5)
NEUTROPHILS # BLD AUTO: 4.2 10*3/MM3 (ref 1.4–6.5)
NEUTROPHILS # BLD AUTO: 4.3 10*3/MM3 (ref 1.4–6.5)
NEUTROPHILS # BLD AUTO: 4.7 10*3/MM3 (ref 1.4–6.5)
NEUTROPHILS # BLD AUTO: 4.72 10*3/MM3 (ref 1.4–6.5)
NEUTROPHILS # BLD AUTO: 4.84 10*3/MM3 (ref 1.4–6.5)
NEUTROPHILS # BLD AUTO: 5.12 10*3/MM3 (ref 1.4–6.5)
NEUTROPHILS # BLD AUTO: 5.18 10*3/MM3 (ref 1.4–6.5)
NEUTROPHILS # BLD AUTO: 5.24 10*3/MM3 (ref 1.4–6.5)
NEUTROPHILS # BLD AUTO: 5.43 10*3/MM3 (ref 1.4–6.5)
NEUTROPHILS # BLD AUTO: 5.56 10*3/MM3 (ref 1.4–6.5)
NEUTROPHILS # BLD AUTO: 5.69 10*3/MM3 (ref 1.4–6.5)
NEUTROPHILS # BLD AUTO: 6.12 10*3/MM3 (ref 1.4–6.5)
NEUTROPHILS # BLD AUTO: 6.22 10*3/MM3 (ref 1.4–6.5)
NEUTROPHILS # BLD AUTO: 6.44 10*3/MM3 (ref 1.4–6.5)
NEUTROPHILS # BLD AUTO: 6.85 10*3/MM3 (ref 1.4–6.5)
NEUTROPHILS # BLD AUTO: 7.02 10*3/MM3 (ref 1.4–6.5)
NEUTROPHILS NFR BLD AUTO: 52 % (ref 30–70)
NEUTROPHILS NFR BLD AUTO: 52.1 % (ref 30–70)
NEUTROPHILS NFR BLD AUTO: 52.2 % (ref 30–70)
NEUTROPHILS NFR BLD AUTO: 52.3 % (ref 30–70)
NEUTROPHILS NFR BLD AUTO: 52.7 % (ref 30–70)
NEUTROPHILS NFR BLD AUTO: 53 % (ref 30–70)
NEUTROPHILS NFR BLD AUTO: 54.3 % (ref 30–70)
NEUTROPHILS NFR BLD AUTO: 55.8 % (ref 30–70)
NEUTROPHILS NFR BLD AUTO: 57.6 % (ref 30–70)
NEUTROPHILS NFR BLD AUTO: 58.4 % (ref 30–70)
NEUTROPHILS NFR BLD AUTO: 58.5 % (ref 30–70)
NEUTROPHILS NFR BLD AUTO: 58.6 % (ref 30–70)
NEUTROPHILS NFR BLD AUTO: 58.9 % (ref 30–70)
NEUTROPHILS NFR BLD AUTO: 59.4 % (ref 30–70)
NEUTROPHILS NFR BLD AUTO: 60 % (ref 30–70)
NEUTROPHILS NFR BLD AUTO: 60.9 % (ref 30–70)
NEUTROPHILS NFR BLD AUTO: 61 % (ref 30–70)
NEUTROPHILS NFR BLD AUTO: 61 % (ref 30–70)
NEUTROPHILS NFR BLD AUTO: 62.1 % (ref 30–70)
NEUTROPHILS NFR BLD AUTO: 64 % (ref 30–70)
NEUTROPHILS NFR BLD AUTO: 64.2 % (ref 30–70)
NEUTROPHILS NFR BLD AUTO: 65 % (ref 30–70)
NEUTROPHILS NFR BLD AUTO: 65.6 % (ref 30–70)
NEUTROPHILS NFR BLD AUTO: 72.9 % (ref 30–70)
NITRITE UR QL STRIP: NEGATIVE
OSMOLALITY SERPL CALC.SUM OF ELEC: 275.2 MOSM/KG (ref 273–305)
OSMOLALITY SERPL CALC.SUM OF ELEC: 275.2 MOSM/KG (ref 273–305)
OSMOLALITY SERPL CALC.SUM OF ELEC: 275.8 MOSM/KG (ref 273–305)
OSMOLALITY SERPL CALC.SUM OF ELEC: 278.5 MOSM/KG (ref 273–305)
OSMOLALITY SERPL CALC.SUM OF ELEC: 278.7 MOSM/KG (ref 273–305)
OSMOLALITY SERPL CALC.SUM OF ELEC: 279 MOSM/KG (ref 273–305)
OSMOLALITY SERPL CALC.SUM OF ELEC: 279.7 MOSM/KG (ref 273–305)
OSMOLALITY SERPL CALC.SUM OF ELEC: 280.2 MOSM/KG (ref 273–305)
OSMOLALITY SERPL CALC.SUM OF ELEC: 280.2 MOSM/KG (ref 273–305)
OSMOLALITY SERPL CALC.SUM OF ELEC: 280.5 MOSM/KG (ref 273–305)
OSMOLALITY SERPL CALC.SUM OF ELEC: 281.9 MOSM/KG (ref 273–305)
OSMOLALITY SERPL CALC.SUM OF ELEC: 282.3 MOSM/KG (ref 273–305)
OSMOLALITY SERPL CALC.SUM OF ELEC: 282.5 MOSM/KG (ref 273–305)
OSMOLALITY SERPL CALC.SUM OF ELEC: 282.9 MOSM/KG (ref 273–305)
OSMOLALITY SERPL CALC.SUM OF ELEC: 283.4 MOSM/KG (ref 273–305)
OSMOLALITY SERPL CALC.SUM OF ELEC: 283.5 MOSM/KG (ref 273–305)
OSMOLALITY SERPL CALC.SUM OF ELEC: 286.3 MOSM/KG (ref 273–305)
OSMOLALITY SERPL CALC.SUM OF ELEC: 286.7 MOSM/KG (ref 273–305)
OSMOLALITY SERPL CALC.SUM OF ELEC: 286.8 MOSM/KG (ref 273–305)
OSMOLALITY SERPL CALC.SUM OF ELEC: 287.2 MOSM/KG (ref 273–305)
OSMOLALITY SERPL CALC.SUM OF ELEC: 288.1 MOSM/KG (ref 273–305)
OSMOLALITY SERPL CALC.SUM OF ELEC: 288.1 MOSM/KG (ref 273–305)
OSMOLALITY SERPL CALC.SUM OF ELEC: 293.6 MOSM/KG (ref 273–305)
OSMOLALITY SERPL CALC.SUM OF ELEC: 293.9 MOSM/KG (ref 273–305)
PH UR STRIP.AUTO: 5.5 [PH] (ref 5–8)
PH UR STRIP.AUTO: <=5 [PH] (ref 5–8)
PLATELET # BLD AUTO: 122 10*3/MM3 (ref 130–400)
PLATELET # BLD AUTO: 171 10*3/MM3 (ref 130–400)
PLATELET # BLD AUTO: 175 10*3/MM3 (ref 130–400)
PLATELET # BLD AUTO: 186 10*3/MM3 (ref 130–400)
PLATELET # BLD AUTO: 194 10*3/MM3 (ref 130–400)
PLATELET # BLD AUTO: 195 10*3/MM3 (ref 130–400)
PLATELET # BLD AUTO: 202 10*3/MM3 (ref 130–400)
PLATELET # BLD AUTO: 205 10*3/MM3 (ref 130–400)
PLATELET # BLD AUTO: 209 10*3/MM3 (ref 130–400)
PLATELET # BLD AUTO: 211 10*3/MM3 (ref 130–400)
PLATELET # BLD AUTO: 220 10*3/MM3 (ref 130–400)
PLATELET # BLD AUTO: 229 10*3/MM3 (ref 130–400)
PLATELET # BLD AUTO: 232 10*3/MM3 (ref 130–400)
PLATELET # BLD AUTO: 232 10*3/MM3 (ref 130–400)
PLATELET # BLD AUTO: 239 10*3/MM3 (ref 130–400)
PLATELET # BLD AUTO: 246 10*3/MM3 (ref 130–400)
PLATELET # BLD AUTO: 247 10*3/MM3 (ref 130–400)
PLATELET # BLD AUTO: 250 10*3/MM3 (ref 130–400)
PLATELET # BLD AUTO: 266 10*3/MM3 (ref 130–400)
PLATELET # BLD AUTO: 266 10*3/MM3 (ref 130–400)
PLATELET # BLD AUTO: 268 10*3/MM3 (ref 130–400)
PLATELET # BLD AUTO: 268 10*3/MM3 (ref 130–400)
PLATELET # BLD AUTO: 269 10*3/MM3 (ref 130–400)
PLATELET # BLD AUTO: 298 10*3/MM3 (ref 130–400)
PMV BLD AUTO: 10.1 FL (ref 6–10)
PMV BLD AUTO: 10.1 FL (ref 6–10)
PMV BLD AUTO: 10.2 FL (ref 6–10)
PMV BLD AUTO: 10.3 FL (ref 6–10)
PMV BLD AUTO: 10.4 FL (ref 6–10)
PMV BLD AUTO: 10.4 FL (ref 6–10)
PMV BLD AUTO: 10.5 FL (ref 6–10)
PMV BLD AUTO: 10.6 FL (ref 6–10)
PMV BLD AUTO: 10.7 FL (ref 6–10)
PMV BLD AUTO: 10.7 FL (ref 6–10)
PMV BLD AUTO: 10.9 FL (ref 6–10)
PMV BLD AUTO: 11.5 FL (ref 6–10)
PMV BLD AUTO: 9.9 FL (ref 6–10)
POTASSIUM BLD-SCNC: 3.7 MMOL/L (ref 3.5–5.3)
POTASSIUM BLD-SCNC: 3.8 MMOL/L (ref 3.5–5.3)
POTASSIUM BLD-SCNC: 3.8 MMOL/L (ref 3.5–5.3)
POTASSIUM BLD-SCNC: 4 MMOL/L (ref 3.5–5.3)
POTASSIUM BLD-SCNC: 4.1 MMOL/L (ref 3.5–5.3)
POTASSIUM BLD-SCNC: 4.2 MMOL/L (ref 3.5–5.3)
POTASSIUM BLD-SCNC: 4.3 MMOL/L (ref 3.5–5.3)
POTASSIUM BLD-SCNC: 4.4 MMOL/L (ref 3.5–5.3)
POTASSIUM BLD-SCNC: 4.5 MMOL/L (ref 3.5–5.3)
POTASSIUM BLD-SCNC: 4.6 MMOL/L (ref 3.5–5.3)
POTASSIUM BLD-SCNC: 4.8 MMOL/L (ref 3.5–5.3)
POTASSIUM BLD-SCNC: 4.9 MMOL/L (ref 3.5–5.3)
PROT SERPL-MCNC: 6.7 G/DL (ref 6–8)
PROT SERPL-MCNC: 6.8 G/DL (ref 6–8)
PROT SERPL-MCNC: 6.9 G/DL (ref 6–8)
PROT SERPL-MCNC: 6.9 G/DL (ref 6–8)
PROT SERPL-MCNC: 7.1 G/DL (ref 6–8)
PROT SERPL-MCNC: 7.1 G/DL (ref 6–8)
PROT SERPL-MCNC: 7.4 G/DL (ref 6–8)
PROT SERPL-MCNC: 7.5 G/DL (ref 6–8)
PROT SERPL-MCNC: 7.6 G/DL (ref 6–8)
PROT UR QL STRIP: ABNORMAL
PROT UR QL STRIP: NEGATIVE
PROTHROMBIN TIME: 13.1 SECONDS (ref 11–15.4)
RBC # BLD AUTO: 5.17 10*6/MM3 (ref 4.2–5.4)
RBC # BLD AUTO: 5.23 10*6/MM3 (ref 4.2–5.4)
RBC # BLD AUTO: 5.26 10*6/MM3 (ref 4.2–5.4)
RBC # BLD AUTO: 5.33 10*6/MM3 (ref 4.2–5.4)
RBC # BLD AUTO: 5.41 10*6/MM3 (ref 4.2–5.4)
RBC # BLD AUTO: 5.46 10*6/MM3 (ref 4.2–5.4)
RBC # BLD AUTO: 5.52 10*6/MM3 (ref 4.2–5.4)
RBC # BLD AUTO: 5.52 10*6/MM3 (ref 4.2–5.4)
RBC # BLD AUTO: 5.53 10*6/MM3 (ref 4.2–5.4)
RBC # BLD AUTO: 5.57 10*6/MM3 (ref 4.2–5.4)
RBC # BLD AUTO: 5.58 10*6/MM3 (ref 4.2–5.4)
RBC # BLD AUTO: 5.61 10*6/MM3 (ref 4.2–5.4)
RBC # BLD AUTO: 5.64 10*6/MM3 (ref 4.2–5.4)
RBC # BLD AUTO: 5.69 10*6/MM3 (ref 4.2–5.4)
RBC # BLD AUTO: 5.72 10*6/MM3 (ref 4.2–5.4)
RBC # BLD AUTO: 5.77 10*6/MM3 (ref 4.2–5.4)
RBC # BLD AUTO: 5.77 10*6/MM3 (ref 4.2–5.4)
RBC # BLD AUTO: 5.78 10*6/MM3 (ref 4.2–5.4)
RBC # BLD AUTO: 5.8 10*6/MM3 (ref 4.2–5.4)
RBC # BLD AUTO: 5.81 10*6/MM3 (ref 4.2–5.4)
RBC # BLD AUTO: 5.9 10*6/MM3 (ref 4.2–5.4)
RBC # BLD AUTO: 5.91 10*6/MM3 (ref 4.2–5.4)
RBC # BLD AUTO: 6.03 10*6/MM3 (ref 4.2–5.4)
RBC # BLD AUTO: 6.06 10*6/MM3 (ref 4.2–5.4)
RBC # UR: ABNORMAL /HPF
RBC # UR: ABNORMAL /HPF
REF LAB TEST METHOD: ABNORMAL
REF LAB TEST METHOD: ABNORMAL
SODIUM BLD-SCNC: 133 MMOL/L (ref 135–153)
SODIUM BLD-SCNC: 133 MMOL/L (ref 135–153)
SODIUM BLD-SCNC: 135 MMOL/L (ref 135–153)
SODIUM BLD-SCNC: 136 MMOL/L (ref 135–153)
SODIUM BLD-SCNC: 137 MMOL/L (ref 135–153)
SODIUM BLD-SCNC: 138 MMOL/L (ref 135–153)
SODIUM BLD-SCNC: 139 MMOL/L (ref 135–153)
SODIUM BLD-SCNC: 140 MMOL/L (ref 135–153)
SODIUM BLD-SCNC: 142 MMOL/L (ref 135–153)
SODIUM BLD-SCNC: 146 MMOL/L (ref 135–153)
SODIUM BLD-SCNC: 146 MMOL/L (ref 135–153)
SP GR UR STRIP: 1.02 (ref 1–1.03)
SP GR UR STRIP: 1.03 (ref 1–1.03)
SP GR UR STRIP: >1.03 (ref 1–1.03)
SP GR UR STRIP: >=1.03 (ref 1–1.03)
SP GR UR STRIP: >=1.03 (ref 1–1.03)
SQUAMOUS #/AREA URNS HPF: ABNORMAL /HPF
SQUAMOUS #/AREA URNS HPF: ABNORMAL /HPF
UROBILINOGEN UR QL STRIP: ABNORMAL
UROBILINOGEN UR QL STRIP: NORMAL
UROBILINOGEN UR QL STRIP: NORMAL
VIT B12 BLD-MCNC: 1053 PG/ML (ref 211–911)
WBC NRBC COR # BLD: 10.19 10*3/MM3 (ref 4.5–12.5)
WBC NRBC COR # BLD: 10.53 10*3/MM3 (ref 4.5–12.5)
WBC NRBC COR # BLD: 10.94 10*3/MM3 (ref 4.5–12.5)
WBC NRBC COR # BLD: 6.67 10*3/MM3 (ref 4.5–12.5)
WBC NRBC COR # BLD: 7.04 10*3/MM3 (ref 4.5–12.5)
WBC NRBC COR # BLD: 7.12 10*3/MM3 (ref 4.5–12.5)
WBC NRBC COR # BLD: 7.13 10*3/MM3 (ref 4.5–12.5)
WBC NRBC COR # BLD: 7.26 10*3/MM3 (ref 4.5–12.5)
WBC NRBC COR # BLD: 7.36 10*3/MM3 (ref 4.5–12.5)
WBC NRBC COR # BLD: 7.42 10*3/MM3 (ref 4.5–12.5)
WBC NRBC COR # BLD: 7.5 10*3/MM3 (ref 4.5–12.5)
WBC NRBC COR # BLD: 7.57 10*3/MM3 (ref 4.5–12.5)
WBC NRBC COR # BLD: 7.64 10*3/MM3 (ref 4.5–12.5)
WBC NRBC COR # BLD: 7.91 10*3/MM3 (ref 4.5–12.5)
WBC NRBC COR # BLD: 7.94 10*3/MM3 (ref 4.5–12.5)
WBC NRBC COR # BLD: 8.25 10*3/MM3 (ref 4.5–12.5)
WBC NRBC COR # BLD: 8.48 10*3/MM3 (ref 4.5–12.5)
WBC NRBC COR # BLD: 8.53 10*3/MM3 (ref 4.5–12.5)
WBC NRBC COR # BLD: 8.73 10*3/MM3 (ref 4.5–12.5)
WBC NRBC COR # BLD: 8.76 10*3/MM3 (ref 4.5–12.5)
WBC NRBC COR # BLD: 9.12 10*3/MM3 (ref 4.5–12.5)
WBC NRBC COR # BLD: 9.22 10*3/MM3 (ref 4.5–12.5)
WBC NRBC COR # BLD: 9.57 10*3/MM3 (ref 4.5–12.5)
WBC NRBC COR # BLD: 9.81 10*3/MM3 (ref 4.5–12.5)
WBC UR QL AUTO: ABNORMAL /HPF
WBC UR QL AUTO: ABNORMAL /HPF

## 2018-01-01 PROCEDURE — 85025 COMPLETE CBC W/AUTO DIFF WBC: CPT | Performed by: FAMILY MEDICINE

## 2018-01-01 PROCEDURE — 25010000002 BEVACIZUMAB PER 10 MG: Performed by: INTERNAL MEDICINE

## 2018-01-01 PROCEDURE — 80048 BASIC METABOLIC PNL TOTAL CA: CPT | Performed by: INTERNAL MEDICINE

## 2018-01-01 PROCEDURE — 25010000002 PALONOSETRON PER 25 MCG: Performed by: INTERNAL MEDICINE

## 2018-01-01 PROCEDURE — 25010000002 BEVACIZUMAB 100 MG/4ML SOLUTION 4 ML VIAL: Performed by: INTERNAL MEDICINE

## 2018-01-01 PROCEDURE — 96413 CHEMO IV INFUSION 1 HR: CPT

## 2018-01-01 PROCEDURE — 25010000002 HEPARIN FLUSH (PORCINE) 100 UNIT/ML SOLUTION: Performed by: INTERNAL MEDICINE

## 2018-01-01 PROCEDURE — 96417 CHEMO IV INFUS EACH ADDL SEQ: CPT

## 2018-01-01 PROCEDURE — 71045 X-RAY EXAM CHEST 1 VIEW: CPT | Performed by: RADIOLOGY

## 2018-01-01 PROCEDURE — 81001 URINALYSIS AUTO W/SCOPE: CPT | Performed by: INTERNAL MEDICINE

## 2018-01-01 PROCEDURE — 25010000002 CARBOPLATIN PER 50 MG: Performed by: INTERNAL MEDICINE

## 2018-01-01 PROCEDURE — 99212 OFFICE O/P EST SF 10 MIN: CPT | Performed by: NEUROLOGICAL SURGERY

## 2018-01-01 PROCEDURE — 0 GADOBENATE DIMEGLUMINE 529 MG/ML SOLUTION: Performed by: NEUROLOGICAL SURGERY

## 2018-01-01 PROCEDURE — 25010000002 HEPARIN FLUSH (PORCINE) 100 UNIT/ML SOLUTION

## 2018-01-01 PROCEDURE — 96376 TX/PRO/DX INJ SAME DRUG ADON: CPT

## 2018-01-01 PROCEDURE — 80053 COMPREHEN METABOLIC PANEL: CPT | Performed by: INTERNAL MEDICINE

## 2018-01-01 PROCEDURE — 36415 COLL VENOUS BLD VENIPUNCTURE: CPT

## 2018-01-01 PROCEDURE — 83880 ASSAY OF NATRIURETIC PEPTIDE: CPT | Performed by: FAMILY MEDICINE

## 2018-01-01 PROCEDURE — 25010000002 DEXAMETHASONE SODIUM PHOSPHATE 20 MG/5ML SOLUTION 5 ML VIAL: Performed by: INTERNAL MEDICINE

## 2018-01-01 PROCEDURE — 96415 CHEMO IV INFUSION ADDL HR: CPT

## 2018-01-01 PROCEDURE — 99213 OFFICE O/P EST LOW 20 MIN: CPT | Performed by: ANESTHESIOLOGY

## 2018-01-01 PROCEDURE — 36593 DECLOT VASCULAR DEVICE: CPT | Performed by: NURSE PRACTITIONER

## 2018-01-01 PROCEDURE — 85025 COMPLETE CBC W/AUTO DIFF WBC: CPT

## 2018-01-01 PROCEDURE — 96375 TX/PRO/DX INJ NEW DRUG ADDON: CPT | Performed by: NURSE PRACTITIONER

## 2018-01-01 PROCEDURE — 96375 TX/PRO/DX INJ NEW DRUG ADDON: CPT

## 2018-01-01 PROCEDURE — 99214 OFFICE O/P EST MOD 30 MIN: CPT | Performed by: INTERNAL MEDICINE

## 2018-01-01 PROCEDURE — 85025 COMPLETE CBC W/AUTO DIFF WBC: CPT | Performed by: INTERNAL MEDICINE

## 2018-01-01 PROCEDURE — 81003 URINALYSIS AUTO W/O SCOPE: CPT | Performed by: INTERNAL MEDICINE

## 2018-01-01 PROCEDURE — 96413 CHEMO IV INFUSION 1 HR: CPT | Performed by: NURSE PRACTITIONER

## 2018-01-01 PROCEDURE — 0 IOPAMIDOL 61 % SOLUTION: Performed by: INTERNAL MEDICINE

## 2018-01-01 PROCEDURE — 36598 INJ W/FLUOR EVAL CV DEVICE: CPT | Performed by: RADIOLOGY

## 2018-01-01 PROCEDURE — 80053 COMPREHEN METABOLIC PANEL: CPT

## 2018-01-01 PROCEDURE — 99214 OFFICE O/P EST MOD 30 MIN: CPT | Performed by: NURSE PRACTITIONER

## 2018-01-01 PROCEDURE — 93010 ELECTROCARDIOGRAM REPORT: CPT | Performed by: INTERNAL MEDICINE

## 2018-01-01 PROCEDURE — 96417 CHEMO IV INFUS EACH ADDL SEQ: CPT | Performed by: NURSE PRACTITIONER

## 2018-01-01 PROCEDURE — 70553 MRI BRAIN STEM W/O & W/DYE: CPT

## 2018-01-01 PROCEDURE — 96367 TX/PROPH/DG ADDL SEQ IV INF: CPT

## 2018-01-01 PROCEDURE — 70450 CT HEAD/BRAIN W/O DYE: CPT

## 2018-01-01 PROCEDURE — 85652 RBC SED RATE AUTOMATED: CPT | Performed by: FAMILY MEDICINE

## 2018-01-01 PROCEDURE — G0463 HOSPITAL OUTPT CLINIC VISIT: HCPCS

## 2018-01-01 PROCEDURE — 85730 THROMBOPLASTIN TIME PARTIAL: CPT | Performed by: FAMILY MEDICINE

## 2018-01-01 PROCEDURE — 25010000002 ALTEPLASE 2 MG RECONSTITUTED SOLUTION: Performed by: INTERNAL MEDICINE

## 2018-01-01 PROCEDURE — 99213 OFFICE O/P EST LOW 20 MIN: CPT | Performed by: NEUROLOGICAL SURGERY

## 2018-01-01 PROCEDURE — 25010000002 IRINOTECAN PER 20 MG: Performed by: INTERNAL MEDICINE

## 2018-01-01 PROCEDURE — A9577 INJ MULTIHANCE: HCPCS | Performed by: NEUROLOGICAL SURGERY

## 2018-01-01 PROCEDURE — 96361 HYDRATE IV INFUSION ADD-ON: CPT

## 2018-01-01 PROCEDURE — 80048 BASIC METABOLIC PNL TOTAL CA: CPT

## 2018-01-01 PROCEDURE — 83605 ASSAY OF LACTIC ACID: CPT | Performed by: FAMILY MEDICINE

## 2018-01-01 PROCEDURE — 96365 THER/PROPH/DIAG IV INF INIT: CPT

## 2018-01-01 PROCEDURE — 25010000002 CEFTRIAXONE: Performed by: FAMILY MEDICINE

## 2018-01-01 PROCEDURE — 81003 URINALYSIS AUTO W/O SCOPE: CPT

## 2018-01-01 PROCEDURE — 87086 URINE CULTURE/COLONY COUNT: CPT

## 2018-01-01 PROCEDURE — 99213 OFFICE O/P EST LOW 20 MIN: CPT | Performed by: INTERNAL MEDICINE

## 2018-01-01 PROCEDURE — 82607 VITAMIN B-12: CPT | Performed by: FAMILY MEDICINE

## 2018-01-01 PROCEDURE — 93005 ELECTROCARDIOGRAM TRACING: CPT | Performed by: FAMILY MEDICINE

## 2018-01-01 PROCEDURE — 99284 EMERGENCY DEPT VISIT MOD MDM: CPT

## 2018-01-01 PROCEDURE — 95970 ALYS NPGT W/O PRGRMG: CPT | Performed by: ANESTHESIOLOGY

## 2018-01-01 PROCEDURE — 99213 OFFICE O/P EST LOW 20 MIN: CPT | Performed by: NURSE PRACTITIONER

## 2018-01-01 PROCEDURE — 25010000002 FOSAPREPITANT PER 1 MG: Performed by: INTERNAL MEDICINE

## 2018-01-01 PROCEDURE — 96415 CHEMO IV INFUSION ADDL HR: CPT | Performed by: NURSE PRACTITIONER

## 2018-01-01 PROCEDURE — 85610 PROTHROMBIN TIME: CPT | Performed by: FAMILY MEDICINE

## 2018-01-01 PROCEDURE — 81001 URINALYSIS AUTO W/SCOPE: CPT

## 2018-01-01 PROCEDURE — 71045 X-RAY EXAM CHEST 1 VIEW: CPT

## 2018-01-01 PROCEDURE — 87040 BLOOD CULTURE FOR BACTERIA: CPT | Performed by: FAMILY MEDICINE

## 2018-01-01 PROCEDURE — 80053 COMPREHEN METABOLIC PANEL: CPT | Performed by: FAMILY MEDICINE

## 2018-01-01 PROCEDURE — 99024 POSTOP FOLLOW-UP VISIT: CPT | Performed by: PHYSICIAN ASSISTANT

## 2018-01-01 PROCEDURE — 70450 CT HEAD/BRAIN W/O DYE: CPT | Performed by: RADIOLOGY

## 2018-01-01 PROCEDURE — 36598 INJ W/FLUOR EVAL CV DEVICE: CPT

## 2018-01-01 RX ORDER — ATROPINE SULFATE 1 MG/ML
0.25 INJECTION, SOLUTION INTRAMUSCULAR; INTRAVENOUS; SUBCUTANEOUS
Status: CANCELLED | OUTPATIENT
Start: 2018-01-01

## 2018-01-01 RX ORDER — SODIUM CHLORIDE 9 MG/ML
250 INJECTION, SOLUTION INTRAVENOUS ONCE
Status: COMPLETED | OUTPATIENT
Start: 2018-01-01 | End: 2018-01-01

## 2018-01-01 RX ORDER — SODIUM CHLORIDE 9 MG/ML
250 INJECTION, SOLUTION INTRAVENOUS ONCE
Status: CANCELLED | OUTPATIENT
Start: 2018-01-01 | End: 2018-01-01

## 2018-01-01 RX ORDER — SODIUM CHLORIDE 0.9 % (FLUSH) 0.9 %
10 SYRINGE (ML) INJECTION AS NEEDED
Status: DISCONTINUED | OUTPATIENT
Start: 2018-01-01 | End: 2018-01-01 | Stop reason: HOSPADM

## 2018-01-01 RX ORDER — SODIUM CHLORIDE 0.9 % (FLUSH) 0.9 %
10 SYRINGE (ML) INJECTION AS NEEDED
Status: CANCELLED | OUTPATIENT
Start: 2018-01-01

## 2018-01-01 RX ORDER — PALONOSETRON 0.05 MG/ML
0.25 INJECTION, SOLUTION INTRAVENOUS ONCE
Status: CANCELLED | OUTPATIENT
Start: 2018-01-01 | End: 2018-01-01

## 2018-01-01 RX ORDER — FLUCONAZOLE 200 MG/1
TABLET ORAL
COMMUNITY
Start: 2018-01-01 | End: 2018-01-01

## 2018-01-01 RX ORDER — NYSTATIN 100000 [USP'U]/G
POWDER TOPICAL
COMMUNITY
Start: 2018-01-01

## 2018-01-01 RX ORDER — SODIUM CHLORIDE 9 MG/ML
250 INJECTION, SOLUTION INTRAVENOUS ONCE
Status: CANCELLED | OUTPATIENT
Start: 2018-01-01

## 2018-01-01 RX ORDER — ATROPINE SULFATE 1 MG/ML
0.25 INJECTION, SOLUTION INTRAMUSCULAR; INTRAVENOUS; SUBCUTANEOUS
Status: DISCONTINUED | OUTPATIENT
Start: 2018-01-01 | End: 2018-01-01 | Stop reason: HOSPADM

## 2018-01-01 RX ORDER — RANITIDINE 300 MG/1
TABLET ORAL
Refills: 5 | COMMUNITY
Start: 2018-01-01

## 2018-01-01 RX ORDER — PALONOSETRON 0.05 MG/ML
0.25 INJECTION, SOLUTION INTRAVENOUS ONCE
Status: COMPLETED | OUTPATIENT
Start: 2018-01-01 | End: 2018-01-01

## 2018-01-01 RX ORDER — PALONOSETRON 0.05 MG/ML
0.25 INJECTION, SOLUTION INTRAVENOUS ONCE
Status: CANCELLED
Start: 2019-01-01 | End: 2018-01-01

## 2018-01-01 RX ORDER — HYDROCODONE BITARTRATE AND ACETAMINOPHEN 7.5; 325 MG/1; MG/1
1 TABLET ORAL 2 TIMES DAILY
Qty: 60 TABLET | Refills: 0 | Status: SHIPPED | OUTPATIENT
Start: 2018-01-01 | End: 2018-01-01 | Stop reason: SDUPTHER

## 2018-01-01 RX ORDER — SODIUM CHLORIDE 9 MG/ML
1000 INJECTION, SOLUTION INTRAVENOUS ONCE
Status: COMPLETED | OUTPATIENT
Start: 2018-01-01 | End: 2018-01-01

## 2018-01-01 RX ORDER — DIPHENOXYLATE HYDROCHLORIDE AND ATROPINE SULFATE 2.5; .025 MG/1; MG/1
1 TABLET ORAL 4 TIMES DAILY PRN
Qty: 30 TABLET | Refills: 3 | Status: SHIPPED | OUTPATIENT
Start: 2018-01-01

## 2018-01-01 RX ORDER — ONDANSETRON HYDROCHLORIDE 8 MG/1
8 TABLET, FILM COATED ORAL EVERY 8 HOURS PRN
Qty: 30 TABLET | Refills: 5 | Status: SHIPPED | OUTPATIENT
Start: 2018-01-01

## 2018-01-01 RX ORDER — DEXAMETHASONE 4 MG/1
4 TABLET ORAL 2 TIMES DAILY WITH MEALS
Qty: 60 TABLET | Refills: 0 | Status: SHIPPED | OUTPATIENT
Start: 2018-01-01 | End: 2019-01-01 | Stop reason: SDUPTHER

## 2018-01-01 RX ORDER — PALONOSETRON 0.05 MG/ML
0.25 INJECTION, SOLUTION INTRAVENOUS ONCE
Status: CANCELLED | OUTPATIENT
Start: 2018-01-01

## 2018-01-01 RX ORDER — AMOXICILLIN AND CLAVULANATE POTASSIUM 875; 125 MG/1; MG/1
1 TABLET, FILM COATED ORAL EVERY 12 HOURS SCHEDULED
Qty: 20 TABLET | Refills: 0 | Status: SHIPPED | OUTPATIENT
Start: 2018-01-01 | End: 2018-01-01

## 2018-01-01 RX ORDER — FLUCONAZOLE 200 MG/1
200 TABLET ORAL DAILY
Qty: 3 TABLET | Refills: 0 | Status: SHIPPED | OUTPATIENT
Start: 2018-01-01 | End: 2018-01-01

## 2018-01-01 RX ORDER — HYDROCODONE BITARTRATE AND ACETAMINOPHEN 7.5; 325 MG/1; MG/1
1 TABLET ORAL 2 TIMES DAILY PRN
Qty: 60 TABLET | Refills: 0 | Status: SHIPPED | OUTPATIENT
Start: 2018-01-01 | End: 2019-01-01 | Stop reason: SDUPTHER

## 2018-01-01 RX ORDER — AMOXICILLIN AND CLAVULANATE POTASSIUM 875; 125 MG/1; MG/1
1 TABLET, FILM COATED ORAL 2 TIMES DAILY
Qty: 20 TABLET | Refills: 0 | Status: SHIPPED | OUTPATIENT
Start: 2018-01-01

## 2018-01-01 RX ORDER — SODIUM CHLORIDE 9 MG/ML
INJECTION, SOLUTION INTRAVENOUS
Status: COMPLETED
Start: 2018-01-01 | End: 2018-01-01

## 2018-01-01 RX ORDER — ATROPINE SULFATE 1 MG/ML
0.25 INJECTION, SOLUTION INTRAMUSCULAR; INTRAVENOUS; SUBCUTANEOUS
Status: COMPLETED | OUTPATIENT
Start: 2018-01-01 | End: 2018-01-01

## 2018-01-01 RX ORDER — SODIUM CHLORIDE 9 MG/ML
250 INJECTION, SOLUTION INTRAVENOUS ONCE
Status: CANCELLED | OUTPATIENT
Start: 2019-01-01

## 2018-01-01 RX ORDER — MODAFINIL 200 MG/1
200 TABLET ORAL DAILY
Qty: 30 TABLET | Refills: 2 | OUTPATIENT
Start: 2018-01-01

## 2018-01-01 RX ORDER — DEXAMETHASONE 4 MG/1
TABLET ORAL
Qty: 12 TABLET | Refills: 3 | Status: SHIPPED | OUTPATIENT
Start: 2018-01-01 | End: 2018-01-01 | Stop reason: SDUPTHER

## 2018-01-01 RX ORDER — CYCLOBENZAPRINE HCL 5 MG
TABLET ORAL
Refills: 0 | COMMUNITY
Start: 2018-01-01 | End: 2018-01-01

## 2018-01-01 RX ORDER — PALONOSETRON 0.05 MG/ML
0.25 INJECTION, SOLUTION INTRAVENOUS ONCE
Status: CANCELLED
Start: 2018-01-01 | End: 2018-01-01

## 2018-01-01 RX ORDER — PROCHLORPERAZINE MALEATE 10 MG
10 TABLET ORAL EVERY 6 HOURS PRN
Qty: 60 TABLET | Refills: 3 | Status: SHIPPED | OUTPATIENT
Start: 2018-01-01

## 2018-01-01 RX ADMIN — ATROPINE SULFATE 0.25 MG: 1 INJECTION, SOLUTION INTRAMUSCULAR; INTRAVENOUS; SUBCUTANEOUS at 12:07

## 2018-01-01 RX ADMIN — BEVACIZUMAB 1100 MG: 400 INJECTION, SOLUTION INTRAVENOUS at 14:48

## 2018-01-01 RX ADMIN — Medication 10 ML: at 14:08

## 2018-01-01 RX ADMIN — SODIUM CHLORIDE 250 ML: 9 INJECTION, SOLUTION INTRAVENOUS at 10:40

## 2018-01-01 RX ADMIN — DEXAMETHASONE SODIUM PHOSPHATE 12 MG: 4 INJECTION, SOLUTION INTRAMUSCULAR; INTRAVENOUS at 11:22

## 2018-01-01 RX ADMIN — HEPARIN SODIUM (PORCINE) LOCK FLUSH IV SOLN 100 UNIT/ML 300 UNITS: 100 SOLUTION at 19:00

## 2018-01-01 RX ADMIN — HEPARIN SODIUM (PORCINE) LOCK FLUSH IV SOLN 100 UNIT/ML 500 UNITS: 100 SOLUTION at 13:38

## 2018-01-01 RX ADMIN — GADOBENATE DIMEGLUMINE 20 ML: 529 INJECTION, SOLUTION INTRAVENOUS at 10:05

## 2018-01-01 RX ADMIN — HEPARIN SODIUM (PORCINE) LOCK FLUSH IV SOLN 100 UNIT/ML 500 UNITS: 100 SOLUTION at 14:08

## 2018-01-01 RX ADMIN — Medication 10 ML: at 13:30

## 2018-01-01 RX ADMIN — SODIUM CHLORIDE, PRESERVATIVE FREE 500 UNITS: 5 INJECTION INTRAVENOUS at 13:30

## 2018-01-01 RX ADMIN — SODIUM CHLORIDE 250 ML: 9 INJECTION, SOLUTION INTRAVENOUS at 10:28

## 2018-01-01 RX ADMIN — BEVACIZUMAB 1100 MG: 400 INJECTION, SOLUTION INTRAVENOUS at 12:47

## 2018-01-01 RX ADMIN — PALONOSETRON HYDROCHLORIDE 0.25 MG: 0.25 INJECTION INTRAVENOUS at 11:20

## 2018-01-01 RX ADMIN — PALONOSETRON HYDROCHLORIDE 0.25 MG: 0.25 INJECTION INTRAVENOUS at 11:34

## 2018-01-01 RX ADMIN — ALTEPLASE 2 MG: 2.2 INJECTION, POWDER, LYOPHILIZED, FOR SOLUTION INTRAVENOUS at 11:25

## 2018-01-01 RX ADMIN — BEVACIZUMAB 1100 MG: 400 INJECTION, SOLUTION INTRAVENOUS at 12:43

## 2018-01-01 RX ADMIN — HEPARIN SODIUM (PORCINE) LOCK FLUSH IV SOLN 100 UNIT/ML 500 UNITS: 100 SOLUTION at 13:54

## 2018-01-01 RX ADMIN — BEVACIZUMAB 1040 MG: 400 INJECTION, SOLUTION INTRAVENOUS at 12:00

## 2018-01-01 RX ADMIN — PALONOSETRON HYDROCHLORIDE 0.25 MG: 0.25 INJECTION INTRAVENOUS at 10:28

## 2018-01-01 RX ADMIN — SODIUM CHLORIDE 250 ML: 9 INJECTION, SOLUTION INTRAVENOUS at 11:19

## 2018-01-01 RX ADMIN — SODIUM CHLORIDE 1000 ML: 9 INJECTION, SOLUTION INTRAVENOUS at 11:35

## 2018-01-01 RX ADMIN — BEVACIZUMAB 1100 MG: 400 INJECTION, SOLUTION INTRAVENOUS at 12:40

## 2018-01-01 RX ADMIN — DEXAMETHASONE SODIUM PHOSPHATE 12 MG: 4 INJECTION, SOLUTION INTRAMUSCULAR; INTRAVENOUS at 11:23

## 2018-01-01 RX ADMIN — BEVACIZUMAB 1100 MG: 400 INJECTION, SOLUTION INTRAVENOUS at 10:40

## 2018-01-01 RX ADMIN — SODIUM CHLORIDE 250 ML: 9 INJECTION, SOLUTION INTRAVENOUS at 11:45

## 2018-01-01 RX ADMIN — PALONOSETRON HYDROCHLORIDE 0.25 MG: 0.25 INJECTION INTRAVENOUS at 11:22

## 2018-01-01 RX ADMIN — HEPARIN SODIUM (PORCINE) LOCK FLUSH IV SOLN 100 UNIT/ML 500 UNITS: 100 SOLUTION at 11:52

## 2018-01-01 RX ADMIN — BEVACIZUMAB 1080 MG: 400 INJECTION, SOLUTION INTRAVENOUS at 13:44

## 2018-01-01 RX ADMIN — PALONOSETRON 0.25 MG: 0.25 INJECTION, SOLUTION INTRAVENOUS at 11:01

## 2018-01-01 RX ADMIN — SODIUM CHLORIDE 250 ML: 9 INJECTION, SOLUTION INTRAVENOUS at 14:48

## 2018-01-01 RX ADMIN — PALONOSETRON HYDROCHLORIDE 0.25 MG: 0.25 INJECTION INTRAVENOUS at 10:30

## 2018-01-01 RX ADMIN — BEVACIZUMAB 1100 MG: 400 INJECTION, SOLUTION INTRAVENOUS at 12:48

## 2018-01-01 RX ADMIN — CARBOPLATIN 750 MG: 10 INJECTION, SOLUTION INTRAVENOUS at 12:35

## 2018-01-01 RX ADMIN — HEPARIN SODIUM (PORCINE) LOCK FLUSH IV SOLN 100 UNIT/ML 500 UNITS: 100 SOLUTION at 15:34

## 2018-01-01 RX ADMIN — SODIUM CHLORIDE 250 ML: 9 INJECTION, SOLUTION INTRAVENOUS at 10:30

## 2018-01-01 RX ADMIN — ATROPINE SULFATE 0.25 MG: 1 INJECTION, SOLUTION INTRAMUSCULAR; INTRAVENOUS; SUBCUTANEOUS at 11:10

## 2018-01-01 RX ADMIN — HEPARIN SODIUM (PORCINE) LOCK FLUSH IV SOLN 100 UNIT/ML 500 UNITS: 100 SOLUTION at 13:40

## 2018-01-01 RX ADMIN — SODIUM CHLORIDE 250 ML: 9 INJECTION, SOLUTION INTRAVENOUS at 12:47

## 2018-01-01 RX ADMIN — SODIUM CHLORIDE 250 ML: 9 INJECTION, SOLUTION INTRAVENOUS at 10:56

## 2018-01-01 RX ADMIN — SODIUM CHLORIDE 250 ML: 9 INJECTION, SOLUTION INTRAVENOUS at 12:48

## 2018-01-01 RX ADMIN — SODIUM CHLORIDE, PRESERVATIVE FREE 500 UNITS: 5 INJECTION INTRAVENOUS at 12:02

## 2018-01-01 RX ADMIN — ATROPINE SULFATE 0.25 MG: 1 INJECTION, SOLUTION INTRAMUSCULAR; INTRAVENOUS; SUBCUTANEOUS at 11:42

## 2018-01-01 RX ADMIN — BEVACIZUMAB 1050 MG: 400 INJECTION, SOLUTION INTRAVENOUS at 10:56

## 2018-01-01 RX ADMIN — BEVACIZUMAB 1100 MG: 400 INJECTION, SOLUTION INTRAVENOUS at 11:45

## 2018-01-01 RX ADMIN — Medication 10 ML: at 12:46

## 2018-01-01 RX ADMIN — SODIUM CHLORIDE 250 ML: 9 INJECTION, SOLUTION INTRAVENOUS at 11:22

## 2018-01-01 RX ADMIN — SODIUM CHLORIDE, PRESERVATIVE FREE 500 UNITS: 5 INJECTION INTRAVENOUS at 12:30

## 2018-01-01 RX ADMIN — Medication 10 ML: at 15:33

## 2018-01-01 RX ADMIN — BEVACIZUMAB 1100 MG: 400 INJECTION, SOLUTION INTRAVENOUS at 13:04

## 2018-01-01 RX ADMIN — Medication 10 ML: at 13:40

## 2018-01-01 RX ADMIN — DEXAMETHASONE SODIUM PHOSPHATE 12 MG: 4 INJECTION, SOLUTION INTRAMUSCULAR; INTRAVENOUS at 11:02

## 2018-01-01 RX ADMIN — DEXTROSE MONOHYDRATE 260 MG: 50 INJECTION, SOLUTION INTRAVENOUS at 11:44

## 2018-01-01 RX ADMIN — SODIUM CHLORIDE 250 ML: 9 INJECTION, SOLUTION INTRAVENOUS at 12:01

## 2018-01-01 RX ADMIN — GADOBENATE DIMEGLUMINE 20 ML: 529 INJECTION, SOLUTION INTRAVENOUS at 10:24

## 2018-01-01 RX ADMIN — SODIUM CHLORIDE 1000 ML: 9 INJECTION, SOLUTION INTRAVENOUS at 16:23

## 2018-01-01 RX ADMIN — BEVACIZUMAB 1000 MG: 400 INJECTION, SOLUTION INTRAVENOUS at 11:51

## 2018-01-01 RX ADMIN — DEXTROSE MONOHYDRATE 260 MG: 50 INJECTION, SOLUTION INTRAVENOUS at 10:55

## 2018-01-01 RX ADMIN — BEVACIZUMAB 1100 MG: 400 INJECTION, SOLUTION INTRAVENOUS at 11:15

## 2018-01-01 RX ADMIN — SODIUM CHLORIDE, PRESERVATIVE FREE 500 UNITS: 5 INJECTION INTRAVENOUS at 13:45

## 2018-01-01 RX ADMIN — Medication 10 ML: at 13:38

## 2018-01-01 RX ADMIN — SODIUM CHLORIDE, PRESERVATIVE FREE 500 UNITS: 5 INJECTION INTRAVENOUS at 12:47

## 2018-01-01 RX ADMIN — SODIUM CHLORIDE 250 ML: 9 INJECTION, SOLUTION INTRAVENOUS at 11:32

## 2018-01-01 RX ADMIN — SODIUM CHLORIDE, PRESERVATIVE FREE 10 ML: 5 INJECTION INTRAVENOUS at 12:40

## 2018-01-01 RX ADMIN — GADOBENATE DIMEGLUMINE 20 ML: 529 INJECTION, SOLUTION INTRAVENOUS at 11:00

## 2018-01-01 RX ADMIN — SODIUM CHLORIDE, PRESERVATIVE FREE 500 UNITS: 5 INJECTION INTRAVENOUS at 13:55

## 2018-01-01 RX ADMIN — SODIUM CHLORIDE 250 ML: 9 INJECTION, SOLUTION INTRAVENOUS at 11:14

## 2018-01-01 RX ADMIN — SODIUM CHLORIDE 250 ML: 9 INJECTION, SOLUTION INTRAVENOUS at 13:04

## 2018-01-01 RX ADMIN — Medication 10 ML: at 13:45

## 2018-01-01 RX ADMIN — SODIUM CHLORIDE, PRESERVATIVE FREE 10 ML: 5 INJECTION INTRAVENOUS at 13:30

## 2018-01-01 RX ADMIN — BEVACIZUMAB 1100 MG: 400 INJECTION, SOLUTION INTRAVENOUS at 13:21

## 2018-01-01 RX ADMIN — CEFTRIAXONE 1 G: 1 INJECTION, POWDER, FOR SOLUTION INTRAMUSCULAR; INTRAVENOUS at 16:23

## 2018-01-01 RX ADMIN — BEVACIZUMAB 1100 MG: 400 INJECTION, SOLUTION INTRAVENOUS at 10:30

## 2018-01-01 RX ADMIN — HEPARIN SODIUM (PORCINE) LOCK FLUSH IV SOLN 100 UNIT/ML 500 UNITS: 100 SOLUTION at 11:18

## 2018-01-01 RX ADMIN — SODIUM CHLORIDE 250 ML: 9 INJECTION, SOLUTION INTRAVENOUS at 12:43

## 2018-01-01 RX ADMIN — SODIUM CHLORIDE 250 ML: 9 INJECTION, SOLUTION INTRAVENOUS at 10:58

## 2018-01-01 RX ADMIN — SODIUM CHLORIDE, PRESERVATIVE FREE 500 UNITS: 5 INJECTION INTRAVENOUS at 14:45

## 2018-01-01 RX ADMIN — BEVACIZUMAB 1100 MG: 400 INJECTION, SOLUTION INTRAVENOUS at 13:56

## 2018-01-01 RX ADMIN — SODIUM CHLORIDE, PRESERVATIVE FREE 500 UNITS: 5 INJECTION INTRAVENOUS at 12:01

## 2018-01-01 RX ADMIN — DEXAMETHASONE SODIUM PHOSPHATE 12 MG: 4 INJECTION, SOLUTION INTRAMUSCULAR; INTRAVENOUS at 10:28

## 2018-01-01 RX ADMIN — HEPARIN SODIUM (PORCINE) LOCK FLUSH IV SOLN 100 UNIT/ML 500 UNITS: 100 SOLUTION at 13:30

## 2018-01-01 RX ADMIN — Medication 10 ML: at 11:15

## 2018-01-01 RX ADMIN — DEXTROSE MONOHYDRATE 260 MG: 50 INJECTION, SOLUTION INTRAVENOUS at 11:45

## 2018-01-01 RX ADMIN — SODIUM CHLORIDE 150 MG: 9 INJECTION, SOLUTION INTRAVENOUS at 11:16

## 2018-01-01 RX ADMIN — BEVACIZUMAB 1100 MG: 400 INJECTION, SOLUTION INTRAVENOUS at 11:00

## 2018-01-01 RX ADMIN — BEVACIZUMAB 1100 MG: 400 INJECTION, SOLUTION INTRAVENOUS at 10:59

## 2018-01-01 RX ADMIN — DEXTROSE MONOHYDRATE 260 MG: 50 INJECTION, SOLUTION INTRAVENOUS at 12:04

## 2018-01-01 RX ADMIN — DEXAMETHASONE SODIUM PHOSPHATE 12 MG: 4 INJECTION, SOLUTION INTRAMUSCULAR; INTRAVENOUS at 11:36

## 2018-01-01 RX ADMIN — SODIUM CHLORIDE 250 ML: 9 INJECTION, SOLUTION INTRAVENOUS at 11:00

## 2018-01-01 RX ADMIN — DEXAMETHASONE SODIUM PHOSPHATE 12 MG: 4 INJECTION, SOLUTION INTRAMUSCULAR; INTRAVENOUS at 10:30

## 2018-01-01 RX ADMIN — HEPARIN SODIUM (PORCINE) LOCK FLUSH IV SOLN 100 UNIT/ML 500 UNITS: 100 SOLUTION at 12:41

## 2018-01-01 RX ADMIN — DEXTROSE MONOHYDRATE 260 MG: 50 INJECTION, SOLUTION INTRAVENOUS at 12:12

## 2018-01-01 RX ADMIN — IOPAMIDOL 15 ML: 612 INJECTION, SOLUTION INTRAVENOUS at 13:25

## 2018-01-01 RX ADMIN — GADOBENATE DIMEGLUMINE 20 ML: 529 INJECTION, SOLUTION INTRAVENOUS at 10:17

## 2018-01-01 RX ADMIN — SODIUM CHLORIDE, PRESERVATIVE FREE 500 UNITS: 5 INJECTION INTRAVENOUS at 11:25

## 2018-01-01 RX ADMIN — ATROPINE SULFATE 0.25 MG: 1 INJECTION, SOLUTION INTRAMUSCULAR; INTRAVENOUS; SUBCUTANEOUS at 12:02

## 2018-01-01 RX ADMIN — BEVACIZUMAB 1100 MG: 400 INJECTION, SOLUTION INTRAVENOUS at 12:01

## 2018-01-01 RX ADMIN — SODIUM CHLORIDE, PRESERVATIVE FREE 500 UNITS: 5 INJECTION INTRAVENOUS at 11:15

## 2018-01-01 RX ADMIN — ATROPINE SULFATE 0.25 MG: 1 INJECTION, SOLUTION INTRAMUSCULAR; INTRAVENOUS; SUBCUTANEOUS at 13:20

## 2018-01-01 RX ADMIN — SODIUM CHLORIDE, PRESERVATIVE FREE 500 UNITS: 5 INJECTION INTRAVENOUS at 14:28

## 2018-01-01 RX ADMIN — Medication 10 ML: at 11:52

## 2018-01-01 RX ADMIN — ATROPINE SULFATE 0.25 MG: 1 INJECTION, SOLUTION INTRAMUSCULAR; INTRAVENOUS; SUBCUTANEOUS at 12:35

## 2018-01-04 DIAGNOSIS — C71.9 GLIOMA (HCC): ICD-10-CM

## 2018-01-04 RX ORDER — SODIUM CHLORIDE 9 MG/ML
250 INJECTION, SOLUTION INTRAVENOUS ONCE
Status: CANCELLED | OUTPATIENT
Start: 2018-01-01 | End: 2018-01-01

## 2018-01-07 ENCOUNTER — APPOINTMENT (OUTPATIENT)
Dept: PREADMISSION TESTING | Facility: HOSPITAL | Age: 43
End: 2018-01-07

## 2018-01-07 ENCOUNTER — ANESTHESIA EVENT (OUTPATIENT)
Dept: PERIOP | Facility: HOSPITAL | Age: 43
End: 2018-01-07

## 2018-01-07 VITALS — BODY MASS INDEX: 45.3 KG/M2 | HEIGHT: 62 IN | WEIGHT: 246.2 LBS

## 2018-01-07 DIAGNOSIS — M54.41 CHRONIC BILATERAL LOW BACK PAIN WITH BILATERAL SCIATICA: ICD-10-CM

## 2018-01-07 DIAGNOSIS — G89.29 CHRONIC LOW BACK PAIN WITH BILATERAL SCIATICA: ICD-10-CM

## 2018-01-07 DIAGNOSIS — M54.42 CHRONIC LOW BACK PAIN WITH BILATERAL SCIATICA: ICD-10-CM

## 2018-01-07 DIAGNOSIS — M54.42 CHRONIC BILATERAL LOW BACK PAIN WITH BILATERAL SCIATICA: ICD-10-CM

## 2018-01-07 DIAGNOSIS — M54.41 CHRONIC LOW BACK PAIN WITH BILATERAL SCIATICA: ICD-10-CM

## 2018-01-07 DIAGNOSIS — G89.29 CHRONIC BILATERAL LOW BACK PAIN WITH BILATERAL SCIATICA: ICD-10-CM

## 2018-01-07 DIAGNOSIS — C71.9 GLIOMA (HCC): ICD-10-CM

## 2018-01-07 LAB
ANION GAP SERPL CALCULATED.3IONS-SCNC: 9 MMOL/L (ref 3–11)
BASOPHILS # BLD AUTO: 0.04 10*3/MM3 (ref 0–0.2)
BASOPHILS NFR BLD AUTO: 0.3 % (ref 0–1)
BILIRUB UR QL STRIP: NEGATIVE
BUN BLD-MCNC: 14 MG/DL (ref 9–23)
BUN/CREAT SERPL: 17.5 (ref 7–25)
CALCIUM SPEC-SCNC: 9.4 MG/DL (ref 8.7–10.4)
CHLORIDE SERPL-SCNC: 101 MMOL/L (ref 99–109)
CLARITY UR: ABNORMAL
CO2 SERPL-SCNC: 24 MMOL/L (ref 20–31)
COLOR UR: YELLOW
CREAT BLD-MCNC: 0.8 MG/DL (ref 0.6–1.3)
DEPRECATED RDW RBC AUTO: 41.9 FL (ref 37–54)
EOSINOPHIL # BLD AUTO: 0.91 10*3/MM3 (ref 0–0.3)
EOSINOPHIL NFR BLD AUTO: 7.2 % (ref 0–3)
ERYTHROCYTE [DISTWIDTH] IN BLOOD BY AUTOMATED COUNT: 14.3 % (ref 11.3–14.5)
GFR SERPL CREATININE-BSD FRML MDRD: 78 ML/MIN/1.73
GLUCOSE BLD-MCNC: 133 MG/DL (ref 70–100)
GLUCOSE UR STRIP-MCNC: NEGATIVE MG/DL
HCT VFR BLD AUTO: 45.8 % (ref 34.5–44)
HGB BLD-MCNC: 15.6 G/DL (ref 11.5–15.5)
HGB UR QL STRIP.AUTO: NEGATIVE
IMM GRANULOCYTES # BLD: 0.11 10*3/MM3 (ref 0–0.03)
IMM GRANULOCYTES NFR BLD: 0.9 % (ref 0–0.6)
KETONES UR QL STRIP: NEGATIVE
LEUKOCYTE ESTERASE UR QL STRIP.AUTO: NEGATIVE
LYMPHOCYTES # BLD AUTO: 3.01 10*3/MM3 (ref 0.6–4.8)
LYMPHOCYTES NFR BLD AUTO: 24 % (ref 24–44)
MCH RBC QN AUTO: 27.6 PG (ref 27–31)
MCHC RBC AUTO-ENTMCNC: 34.1 G/DL (ref 32–36)
MCV RBC AUTO: 81.1 FL (ref 80–99)
MONOCYTES # BLD AUTO: 0.75 10*3/MM3 (ref 0–1)
MONOCYTES NFR BLD AUTO: 6 % (ref 0–12)
MRSA DNA SPEC QL NAA+PROBE: NEGATIVE
NEUTROPHILS # BLD AUTO: 7.74 10*3/MM3 (ref 1.5–8.3)
NEUTROPHILS NFR BLD AUTO: 61.6 % (ref 41–71)
NITRITE UR QL STRIP: NEGATIVE
PH UR STRIP.AUTO: <=5 [PH] (ref 5–8)
PLATELET # BLD AUTO: 248 10*3/MM3 (ref 150–450)
PMV BLD AUTO: 10.7 FL (ref 6–12)
POTASSIUM BLD-SCNC: 4.1 MMOL/L (ref 3.5–5.5)
PROT UR QL STRIP: NEGATIVE
RBC # BLD AUTO: 5.65 10*6/MM3 (ref 3.89–5.14)
SODIUM BLD-SCNC: 134 MMOL/L (ref 132–146)
SP GR UR STRIP: 1.02 (ref 1–1.03)
UROBILINOGEN UR QL STRIP: ABNORMAL
WBC NRBC COR # BLD: 12.56 10*3/MM3 (ref 3.5–10.8)

## 2018-01-07 PROCEDURE — 85025 COMPLETE CBC W/AUTO DIFF WBC: CPT | Performed by: INTERNAL MEDICINE

## 2018-01-07 PROCEDURE — 81003 URINALYSIS AUTO W/O SCOPE: CPT | Performed by: INTERNAL MEDICINE

## 2018-01-07 PROCEDURE — 80048 BASIC METABOLIC PNL TOTAL CA: CPT | Performed by: INTERNAL MEDICINE

## 2018-01-07 PROCEDURE — 93010 ELECTROCARDIOGRAM REPORT: CPT | Performed by: INTERNAL MEDICINE

## 2018-01-07 PROCEDURE — 36415 COLL VENOUS BLD VENIPUNCTURE: CPT

## 2018-01-07 PROCEDURE — 87641 MR-STAPH DNA AMP PROBE: CPT | Performed by: ANESTHESIOLOGY

## 2018-01-07 PROCEDURE — 93005 ELECTROCARDIOGRAM TRACING: CPT

## 2018-01-07 RX ORDER — RANITIDINE 150 MG/1
150 CAPSULE ORAL 2 TIMES DAILY
COMMUNITY
End: 2018-01-01 | Stop reason: DRUGHIGH

## 2018-01-07 RX ORDER — IBUPROFEN 800 MG/1
800 TABLET ORAL EVERY 8 HOURS PRN
COMMUNITY
End: 2018-01-01

## 2018-01-07 NOTE — PAT
BACTROBAN APPLIED TO EACH NOSTRIL DURING PAT VISIT     Patient to apply Chlorhexadine wipes  to surgical area (as instructed) the night before procedure and the AM of procedure. Wipes provided.

## 2018-01-07 NOTE — DISCHARGE INSTRUCTIONS
The following information and instructions were given:    NPO after MN except sips of water with routine prescribed medication (except blood thinner, diabetes, or weight reducing medication) unless otherwise instructed by your physician.  Do not eat, drink, smoke or chew gum after MN the night before surgery. This also includes no mints.    DO NOT shave for two days before your procedure.  Do not wear makeup.      DO NOT wear fingernail polish (gel/regular) and/or acrylic/artificial nails on the day of surgery.   If a patient had recent manicure and would rather not remove polish or artificial nails, then the minimum requirement is that the polish/artificial nails must be removed from the middle finger on each hand.      If patient was having surgery on an upper extremity, then the patient was instructed that fingernail polish/artificial fingernails must be removed for surgery.  NO EXCEPTIONS.      If patient was having surgery on a lower extremity, then the patient was instructed that toenail polish on both extremities must be removed for surgery.  NO EXCEPTIONS.    Remove all jewelry (advised to go to jeweler if unable to remove).  Jewelry especially rings can no longer be taped for surgery.    Leave anything you consider valuable at home.    Leave your suitcase in the car until after your surgery.    Bring the following with you (if applicable)   -picture ID and insurance cards   -Co-pay/deductible required by insurance   -Medications in the original bottles (not a list) including all over-the-counter  medications if not brought to PAT   -Copy of advance directive, living will or power of  documents if not  brought to PAT   -CPAP or BIPAP mask and tubing (do not bring machine)   -Skin prep instructions sheet   -PAT Pass    Education booklet, brochure, handout or binder given to patient.    Pain Control After Surgery handout given to patient.    Respirex use (handout given to patient) and pneumonia  prevention.    Signs and Symptoms of infection.    DVT Prevention stressing the importance of ambulation.    Patient to apply Chlorhexadine wipes to surgical area (as instructed) the night before procedure and the AM of procedure.    When applicable for ERAS patients (colon, orthropedic), patients were instructed to drink 20 ounces of Gatorade or G2 for diabetics (or until full) the morning of surgery.  The Gatorade or G2 must be consumed at least 3 hours before surgery start time.  No RED Gatorade or G2.  Appropriated ERAS handout given to patient during PAT visit.        Patient to apply Chlorhexadine wipes  to surgical area (as instructed) the night before procedure and the AM of procedure. Wipes provided.  BACTROBAN APPLIED TO EACH NOSTRIL DURING PAT VISIT

## 2018-01-08 ENCOUNTER — HOSPITAL ENCOUNTER (OUTPATIENT)
Facility: HOSPITAL | Age: 43
Discharge: HOME | End: 2018-01-08
Attending: NEUROLOGICAL SURGERY | Admitting: NEUROLOGICAL SURGERY

## 2018-01-08 ENCOUNTER — APPOINTMENT (OUTPATIENT)
Dept: GENERAL RADIOLOGY | Facility: HOSPITAL | Age: 43
End: 2018-01-08

## 2018-01-08 ENCOUNTER — ANESTHESIA (OUTPATIENT)
Dept: PERIOP | Facility: HOSPITAL | Age: 43
End: 2018-01-08

## 2018-01-08 VITALS
OXYGEN SATURATION: 92 % | HEART RATE: 83 BPM | TEMPERATURE: 98.2 F | SYSTOLIC BLOOD PRESSURE: 136 MMHG | DIASTOLIC BLOOD PRESSURE: 86 MMHG | RESPIRATION RATE: 16 BRPM

## 2018-01-08 DIAGNOSIS — G89.29 CHRONIC LOW BACK PAIN WITH BILATERAL SCIATICA: ICD-10-CM

## 2018-01-08 DIAGNOSIS — M54.41 CHRONIC LOW BACK PAIN WITH BILATERAL SCIATICA: ICD-10-CM

## 2018-01-08 DIAGNOSIS — M54.42 CHRONIC LOW BACK PAIN WITH BILATERAL SCIATICA: ICD-10-CM

## 2018-01-08 LAB
B-HCG UR QL: NEGATIVE
GLUCOSE BLDC GLUCOMTR-MCNC: 162 MG/DL (ref 70–130)
INTERNAL NEGATIVE CONTROL: NORMAL
INTERNAL POSITIVE CONTROL: REACTIVE
Lab: NORMAL

## 2018-01-08 PROCEDURE — 63710000001 FAMOTIDINE 20 MG TABLET: Performed by: ANESTHESIOLOGY

## 2018-01-08 PROCEDURE — 63685 INS/RPLC SPI NPG/RCVR POCKET: CPT | Performed by: NEUROLOGICAL SURGERY

## 2018-01-08 PROCEDURE — 25010000002 FENTANYL CITRATE (PF) 100 MCG/2ML SOLUTION: Performed by: NURSE ANESTHETIST, CERTIFIED REGISTERED

## 2018-01-08 PROCEDURE — 25010000002 PROPOFOL 10 MG/ML EMULSION: Performed by: NURSE ANESTHETIST, CERTIFIED REGISTERED

## 2018-01-08 PROCEDURE — 25010000002 HYDROMORPHONE PER 4 MG: Performed by: NURSE ANESTHETIST, CERTIFIED REGISTERED

## 2018-01-08 PROCEDURE — C1778 LEAD, NEUROSTIMULATOR: HCPCS | Performed by: NEUROLOGICAL SURGERY

## 2018-01-08 PROCEDURE — C1767 GENERATOR, NEURO NON-RECHARG: HCPCS | Performed by: NEUROLOGICAL SURGERY

## 2018-01-08 PROCEDURE — 25010000002 NEOSTIGMINE 10 MG/10ML SOLUTION: Performed by: NURSE ANESTHETIST, CERTIFIED REGISTERED

## 2018-01-08 PROCEDURE — 76000 FLUOROSCOPY <1 HR PHYS/QHP: CPT

## 2018-01-08 PROCEDURE — 25010000002 VANCOMYCIN PER 500 MG: Performed by: NEUROLOGICAL SURGERY

## 2018-01-08 PROCEDURE — 25010000002 ONDANSETRON PER 1 MG: Performed by: NURSE ANESTHETIST, CERTIFIED REGISTERED

## 2018-01-08 PROCEDURE — 63655 IMPLANT NEUROELECTRODES: CPT | Performed by: NEUROLOGICAL SURGERY

## 2018-01-08 PROCEDURE — A9270 NON-COVERED ITEM OR SERVICE: HCPCS | Performed by: ANESTHESIOLOGY

## 2018-01-08 PROCEDURE — 82962 GLUCOSE BLOOD TEST: CPT

## 2018-01-08 PROCEDURE — 25010000002 DEXAMETHASONE PER 1 MG: Performed by: NURSE ANESTHETIST, CERTIFIED REGISTERED

## 2018-01-08 DEVICE — LD STIM PENTA PADL KT 16CH 3MM 60CM: Type: IMPLANTABLE DEVICE | Site: SPINE THORACIC | Status: FUNCTIONAL

## 2018-01-08 DEVICE — GEN IPG SCS PROCLAIM 7 ELITE NONRECHG: Type: IMPLANTABLE DEVICE | Site: SPINE THORACIC | Status: FUNCTIONAL

## 2018-01-08 RX ORDER — LIDOCAINE HYDROCHLORIDE 10 MG/ML
0.5 INJECTION, SOLUTION EPIDURAL; INFILTRATION; INTRACAUDAL; PERINEURAL ONCE AS NEEDED
Status: COMPLETED | OUTPATIENT
Start: 2018-01-08 | End: 2018-01-08

## 2018-01-08 RX ORDER — PROPOFOL 10 MG/ML
VIAL (ML) INTRAVENOUS AS NEEDED
Status: DISCONTINUED | OUTPATIENT
Start: 2018-01-08 | End: 2018-01-08 | Stop reason: SURG

## 2018-01-08 RX ORDER — ONDANSETRON 2 MG/ML
4 INJECTION INTRAMUSCULAR; INTRAVENOUS ONCE AS NEEDED
Status: DISCONTINUED | OUTPATIENT
Start: 2018-01-08 | End: 2018-01-09 | Stop reason: HOSPADM

## 2018-01-08 RX ORDER — CEFAZOLIN SODIUM 2 G/100ML
2 INJECTION, SOLUTION INTRAVENOUS ONCE
Status: DISCONTINUED | OUTPATIENT
Start: 2018-01-08 | End: 2018-01-08 | Stop reason: HOSPADM

## 2018-01-08 RX ORDER — ATRACURIUM BESYLATE 10 MG/ML
INJECTION, SOLUTION INTRAVENOUS AS NEEDED
Status: DISCONTINUED | OUTPATIENT
Start: 2018-01-08 | End: 2018-01-08 | Stop reason: SURG

## 2018-01-08 RX ORDER — FENTANYL CITRATE 50 UG/ML
INJECTION, SOLUTION INTRAMUSCULAR; INTRAVENOUS AS NEEDED
Status: DISCONTINUED | OUTPATIENT
Start: 2018-01-08 | End: 2018-01-08 | Stop reason: SURG

## 2018-01-08 RX ORDER — HYDROCODONE BITARTRATE AND ACETAMINOPHEN 7.5; 325 MG/1; MG/1
1 TABLET ORAL EVERY 6 HOURS PRN
Qty: 25 TABLET | Refills: 0 | Status: SHIPPED | OUTPATIENT
Start: 2018-01-08 | End: 2018-01-01

## 2018-01-08 RX ORDER — SODIUM CHLORIDE 0.9 % (FLUSH) 0.9 %
1-10 SYRINGE (ML) INJECTION AS NEEDED
Status: DISCONTINUED | OUTPATIENT
Start: 2018-01-08 | End: 2018-01-08 | Stop reason: HOSPADM

## 2018-01-08 RX ORDER — ONDANSETRON 2 MG/ML
INJECTION INTRAMUSCULAR; INTRAVENOUS AS NEEDED
Status: DISCONTINUED | OUTPATIENT
Start: 2018-01-08 | End: 2018-01-08 | Stop reason: SURG

## 2018-01-08 RX ORDER — SODIUM CHLORIDE, SODIUM LACTATE, POTASSIUM CHLORIDE, CALCIUM CHLORIDE 600; 310; 30; 20 MG/100ML; MG/100ML; MG/100ML; MG/100ML
9 INJECTION, SOLUTION INTRAVENOUS CONTINUOUS
Status: DISCONTINUED | OUTPATIENT
Start: 2018-01-08 | End: 2018-01-09 | Stop reason: HOSPADM

## 2018-01-08 RX ORDER — MAGNESIUM HYDROXIDE 1200 MG/15ML
LIQUID ORAL AS NEEDED
Status: DISCONTINUED | OUTPATIENT
Start: 2018-01-08 | End: 2018-01-08 | Stop reason: HOSPADM

## 2018-01-08 RX ORDER — DEXAMETHASONE SODIUM PHOSPHATE 4 MG/ML
INJECTION, SOLUTION INTRA-ARTICULAR; INTRALESIONAL; INTRAMUSCULAR; INTRAVENOUS; SOFT TISSUE AS NEEDED
Status: DISCONTINUED | OUTPATIENT
Start: 2018-01-08 | End: 2018-01-08 | Stop reason: SURG

## 2018-01-08 RX ORDER — FAMOTIDINE 10 MG/ML
20 INJECTION, SOLUTION INTRAVENOUS ONCE
Status: DISCONTINUED | OUTPATIENT
Start: 2018-01-08 | End: 2018-01-08 | Stop reason: HOSPADM

## 2018-01-08 RX ORDER — FENTANYL CITRATE 50 UG/ML
50 INJECTION, SOLUTION INTRAMUSCULAR; INTRAVENOUS
Status: DISCONTINUED | OUTPATIENT
Start: 2018-01-08 | End: 2018-01-09 | Stop reason: HOSPADM

## 2018-01-08 RX ORDER — FAMOTIDINE 20 MG/1
20 TABLET, FILM COATED ORAL ONCE
Status: COMPLETED | OUTPATIENT
Start: 2018-01-08 | End: 2018-01-08

## 2018-01-08 RX ORDER — LABETALOL HYDROCHLORIDE 5 MG/ML
INJECTION, SOLUTION INTRAVENOUS AS NEEDED
Status: DISCONTINUED | OUTPATIENT
Start: 2018-01-08 | End: 2018-01-08 | Stop reason: SURG

## 2018-01-08 RX ORDER — LIDOCAINE HYDROCHLORIDE 10 MG/ML
INJECTION, SOLUTION EPIDURAL; INFILTRATION; INTRACAUDAL; PERINEURAL AS NEEDED
Status: DISCONTINUED | OUTPATIENT
Start: 2018-01-08 | End: 2018-01-08 | Stop reason: SURG

## 2018-01-08 RX ORDER — GLYCOPYRROLATE 0.2 MG/ML
INJECTION INTRAMUSCULAR; INTRAVENOUS AS NEEDED
Status: DISCONTINUED | OUTPATIENT
Start: 2018-01-08 | End: 2018-01-08 | Stop reason: SURG

## 2018-01-08 RX ORDER — HYDROMORPHONE HYDROCHLORIDE 1 MG/ML
0.5 INJECTION, SOLUTION INTRAMUSCULAR; INTRAVENOUS; SUBCUTANEOUS
Status: DISCONTINUED | OUTPATIENT
Start: 2018-01-08 | End: 2018-01-09 | Stop reason: HOSPADM

## 2018-01-08 RX ORDER — NEOSTIGMINE METHYLSULFATE 1 MG/ML
INJECTION, SOLUTION INTRAVENOUS AS NEEDED
Status: DISCONTINUED | OUTPATIENT
Start: 2018-01-08 | End: 2018-01-08 | Stop reason: SURG

## 2018-01-08 RX ORDER — HYDROCODONE BITARTRATE AND ACETAMINOPHEN 7.5; 325 MG/1; MG/1
1 TABLET ORAL ONCE AS NEEDED
Status: DISCONTINUED | OUTPATIENT
Start: 2018-01-08 | End: 2018-01-09 | Stop reason: HOSPADM

## 2018-01-08 RX ADMIN — FAMOTIDINE 20 MG: 20 TABLET, FILM COATED ORAL at 07:18

## 2018-01-08 RX ADMIN — LABETALOL HYDROCHLORIDE 5 MG: 5 INJECTION, SOLUTION INTRAVENOUS at 10:23

## 2018-01-08 RX ADMIN — GLYCOPYRROLATE 0.2 MG: 0.2 INJECTION, SOLUTION INTRAMUSCULAR; INTRAVENOUS at 10:22

## 2018-01-08 RX ADMIN — ONDANSETRON 4 MG: 2 INJECTION INTRAMUSCULAR; INTRAVENOUS at 10:15

## 2018-01-08 RX ADMIN — ATRACURIUM BESYLATE 50 MG: 10 INJECTION, SOLUTION INTRAVENOUS at 09:33

## 2018-01-08 RX ADMIN — FENTANYL CITRATE 250 MCG: 50 INJECTION, SOLUTION INTRAMUSCULAR; INTRAVENOUS at 09:33

## 2018-01-08 RX ADMIN — LIDOCAINE HYDROCHLORIDE 0.5 ML: 10 INJECTION, SOLUTION EPIDURAL; INFILTRATION; INTRACAUDAL; PERINEURAL at 07:18

## 2018-01-08 RX ADMIN — NEOSTIGMINE METHYLSULFATE 2 MG: 1 INJECTION, SOLUTION INTRAVENOUS at 10:22

## 2018-01-08 RX ADMIN — LIDOCAINE HYDROCHLORIDE 60 MG: 10 INJECTION, SOLUTION EPIDURAL; INFILTRATION; INTRACAUDAL; PERINEURAL at 09:33

## 2018-01-08 RX ADMIN — HYDROMORPHONE HYDROCHLORIDE 0.5 MG: 1 INJECTION, SOLUTION INTRAMUSCULAR; INTRAVENOUS; SUBCUTANEOUS at 11:00

## 2018-01-08 RX ADMIN — DEXAMETHASONE SODIUM PHOSPHATE 8 MG: 4 INJECTION, SOLUTION INTRAMUSCULAR; INTRAVENOUS at 09:33

## 2018-01-08 RX ADMIN — SODIUM CHLORIDE, POTASSIUM CHLORIDE, SODIUM LACTATE AND CALCIUM CHLORIDE 9 ML/HR: 600; 310; 30; 20 INJECTION, SOLUTION INTRAVENOUS at 07:18

## 2018-01-08 RX ADMIN — FENTANYL CITRATE 50 MCG: 50 INJECTION INTRAMUSCULAR; INTRAVENOUS at 11:20

## 2018-01-08 RX ADMIN — PROPOFOL 200 MG: 10 INJECTION, EMULSION INTRAVENOUS at 09:33

## 2018-01-08 NOTE — ANESTHESIA PROCEDURE NOTES
Airway  Urgency: elective    Airway not difficult    General Information and Staff    Patient location during procedure: OR    Indications and Patient Condition  Indications for airway management: airway protection    Preoxygenated: yes  MILS not maintained throughout  Mask difficulty assessment: 1 - vent by mask    Final Airway Details  Final airway type: endotracheal airway      Successful airway: ETT  Cuffed: yes   Successful intubation technique: direct laryngoscopy  Endotracheal tube insertion site: oral  Blade: Lobo  Blade size: #3  ETT size: 6.5 mm  Cormack-Lehane Classification: grade I - full view of glottis  Placement verified by: chest auscultation and capnometry   Measured from: lips  ETT to lips (cm): 20  Number of attempts at approach: 1    Additional Comments  Negative epigastric sounds, Breath sound equal bilaterally with symmetric chest rise and fall

## 2018-01-08 NOTE — H&P (VIEW-ONLY)
Patient: Marcelina Alvarez  : 1975     Primary Care Provider: TAYLOR Heard     Requesting Provider: As above           History     Chief Complaint: Bilateral buttock and leg pain.     History of Present Illness: Ms. Alvarez is a 42 old woman well known to our service.  She's been treated and followed by my colleague Dr. Gregorio for low-grade primary brain tumors.  In addition to prior surgery she is now undergoing treatment with a Avastin.  Her last chemotherapy treatment was early in December of this year.  She has a many year history of a severe pain involving her buttocks that extends globally into her legs.  She really doesn't have much in the way of low back pain.  She's not sure what makes her symptoms better or worse.     Review of Systems   Constitutional: Negative for activity change, appetite change, chills, diaphoresis, fatigue, fever and unexpected weight change.   HENT: Negative for congestion, dental problem, drooling, ear discharge, ear pain, facial swelling, hearing loss, mouth sores, nosebleeds, postnasal drip, rhinorrhea, sinus pressure, sneezing, sore throat, tinnitus, trouble swallowing and voice change.    Eyes: Negative for photophobia, pain, discharge, redness, itching and visual disturbance.   Respiratory: Negative for apnea, cough, choking, chest tightness, shortness of breath, wheezing and stridor.    Cardiovascular: Negative for chest pain, palpitations and leg swelling.   Gastrointestinal: Negative for abdominal distention, abdominal pain, anal bleeding, blood in stool, constipation, diarrhea, nausea, rectal pain and vomiting.   Musculoskeletal: Positive for back pain. Negative for arthralgias, gait problem, joint swelling, myalgias, neck pain and neck stiffness.   Skin: Negative for color change, pallor, rash and wound.   Allergic/Immunologic: Negative for environmental allergies, food allergies and immunocompromised state.   Neurological: Positive for weakness and numbness.  "Negative for dizziness, tremors, seizures, syncope, facial asymmetry, speech difficulty, light-headedness and headaches.   Hematological: Negative for adenopathy. Does not bruise/bleed easily.   Psychiatric/Behavioral: Negative for agitation, behavioral problems, confusion, decreased concentration, dysphoric mood, self-injury, sleep disturbance and suicidal ideas. The patient is not nervous/anxious and is not hyperactive.          The patient's past medical history, past surgical history, family history, and social history have been reviewed at length in the electronic medical record.     Past Medical History:   Diagnosis Date   • Anxiety and depression    • Arthritis     RIGHT FOOT \"DUE TO FOUR STRESS FRACTURES\"    • Brain tumor     SURGERY, CHEMO AND RADIATON    • Diabetes mellitus     DX'D TYPE II NIDDM APPROX 6 WEEKS AGO, \"CAUSED BY CHEMO\" CHECKS BS -150 IN AM -275 PM    • Eczema    • Frequent headaches    • GERD (gastroesophageal reflux disease)    • History of shingles     MULTIPLE OUTBREAKS--NO OPEN AREAS AT THIS TIME \"SHOW UP JUST ABOVE BUTTOCKS\"    • Hypertension     CONTROLLED WITH MEDS PER PT    • IBS (irritable bowel syndrome)    • Low back pain    • Nerve damage     from shingles   • PONV (postoperative nausea and vomiting)      Past Surgical History:   Procedure Laterality Date   • BREAST SURGERY      REDUCTION    • CHOLECYSTECTOMY     • CRANIOTOMY FOR TUMOR Right 6/21/2017    Procedure: CRANIOTOMY FOR TUMOR STEREOTACTIC WITH STEALTH; 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 Gregorio)   • CRANIOTOMY FOR TUMOR  08/31/2007    CRANI & EXCISION OF LOW GRADE GLIOMA & LUMBAR DRAIN (Dr. Gregorio)   • ENDOMETRIAL ABLATION       Family History   Problem Relation Age of Onset   • Hypertension Father      pulmonary     Social History     Social History   • " "Marital status:      Spouse name: N/A   • Number of children: N/A   • Years of education: N/A     Occupational History   • Not on file.     Social History Main Topics   • Smoking status: Never Smoker   • Smokeless tobacco: Never Used   • Alcohol use No   • Drug use: No   • Sexual activity: Defer     Other Topics Concern   • Not on file     Social History Narrative     Allergies   Allergen Reactions   • Morphine And Related Hallucinations   • Diamox [Acetazolamide] Rash       Physical Exam:   Temp 97.4 °F (36.3 °C)  Ht 157.5 cm (62\")  Wt 112 kg (247 lb)  BMI 45.18 kg/m2  MUSCULOSKELETAL:  Straight leg raising is negative.  Bashir's Sign is negative.  ROM in back is normal.  Tenderness in the back to palpation is not observed.  NEUROLOGICAL:  Strength is intact in the lower extremities to direct testing.  Muscle tone is normal throughout.  Station and gait are normal.  Sensation is intact to light touch testing throughout.  Deep tendon reflexes are 1+ and symmetrical.  Coordination is intact.        Medical Decision Making     Data Review:   Lumbar MRI is fairly unrevealing.  Thoracic MRI reveals some diffuse degenerative disc changes however her canal is capacious.     Diagnosis:   Bilateral lower extremity pain of uncertain etiology.     Treatment Options:   The patient has done a trial of a spinal cord stimulator which was quite helpful in terms of her pain.  She did have a fair amount of incisional pain related to the trial itself.  Dr. Heaton has requested St. Crow spinal cord stimulator placement with the lead projecting to the level of the superior endplate of T8.  The nature of the procedure as well as the potential risks, complications, limitations, and alternatives to the procedure were discussed at length with the patient and the patient has agreed to proceed with surgery.  We will endeavor to set this up late the first week in January to try and give her a bit more time off of her a Avastin.  " Certainly with that medication she is at increased risk for wound healing issues.  Obviously an MRI compatible system will be required given the need for repeated MRI studies.          Diagnosis Plan   1. Chronic bilateral low back pain with bilateral sciatica

## 2018-01-08 NOTE — ANESTHESIA PREPROCEDURE EVALUATION
Anesthesia Evaluation         Anesthesia Plan    ASA 3     general     intravenous induction   Anesthetic plan and risks discussed with patient.

## 2018-01-08 NOTE — INTERVAL H&P NOTE
Pre-Op H&P (See Recent Office Note Attached for Full H&P)    Chief complaint: Low back pain into Bilateral buttocks    HPI:      Patient is a 42 y.o. female who presents with chronic bilateral low back pain with bilateral sciatica and here today for St. Crow spinal cord stimulator placement with the lead projecting to the level of the superior endplate of T8    Review of Systems:  General ROS:  no fever, chills, rashes, No change since last office visit  Cardiovascular ROS: no chest pain or dyspnea on exertion  Respiratory ROS: no cough, shortness of breath, or wheezing  HEENT:  +Sinus congestion  Heme/Onc:  Last chemo 12/6/17  :  No dysuria    Immunization History:   Influenza:  Yes 2017  Pneumococcal:  no  Tetanus:  unknown    Meds:    Current Facility-Administered Medications on File Prior to Encounter   Medication Dose Route Frequency Provider Last Rate Last Dose   • mupirocin (BACTROBAN) 2 % nasal ointment   Nasal BID Ирина Interiano PA-C         Current Outpatient Prescriptions on File Prior to Encounter   Medication Sig Dispense Refill   • ACCU-CHEK JACOB PLUS test strip USE BID  3   • ACCU-CHEK SOFTCLIX LANCETS lancets      • ARIPiprazole (ABILIFY) 5 MG tablet Take 5 mg by mouth Daily.     • bevacizumab (AVASTIN) 100 MG/4ML chemo injection Infuse  into a venous catheter.     • clonazePAM (KlonoPIN) 1 MG tablet Take 1 mg by mouth 2 (Two) Times a Day As Needed.     • gabapentin (NEURONTIN) 800 MG tablet Take 800 mg by mouth 3 (Three) Times a Day.     • lidocaine-prilocaine (EMLA) 2.5-2.5 % cream Apply 1 hour prior to VAD access (Patient taking differently: 1 application As Needed (before port access for chemo). Apply 1 hour prior to VAD access) 30 g 5   • lisinopril-hydrochlorothiazide (PRINZIDE,ZESTORETIC) 20-12.5 MG per tablet Take 1 tablet by mouth Daily.     • sertraline (ZOLOFT) 100 MG tablet Take 150 mg by mouth Daily.     • SITagliptin (JANUVIA) 100 MG tablet Take 100 mg by mouth Daily.     •  valACYclovir (VALTREX) 500 MG tablet Take 1,000 mg by mouth As Needed (shingles).     • zolpidem (AMBIEN) 5 MG tablet Take 5 mg by mouth Every Night.         Vital Signs:  /90 (BP Location: Right arm, Patient Position: Lying)  Pulse 83  Temp 97.6 °F (36.4 °C) (Temporal Artery )   Resp 18  SpO2 98%    Physical Exam:    CV:  S1S2 regular rate and rhythm, no murmur               Resp:  Clear to auscultation; respirations regular, even and unlabored    Results Review:    I reviewed the patient's new clinical results. 1/7 WBC 12.56, ROS neg for acute infection    Cancer Staging (if applicable)  Cancer Patient: __ yes _x_no __unknown; If yes, clinical stage T:__ N:__M:__, stage group or __N/A    Assessment/Plan:    Chronic bilateral low back pain with bilateral sciatica - St. Crow spinal cord stimulator placement with the lead projecting to the level of the superior endplate of T8    Laura Rashid, APRN  1/8/2018   7:37 AM

## 2018-01-08 NOTE — ANESTHESIA POSTPROCEDURE EVALUATION
Patient: Marcelina Alvarez    Procedure Summary     Date Anesthesia Start Anesthesia Stop Room / Location    01/08/18 0931 1050 [facility] 12 / [facility]       Procedure Diagnosis Surgeon Provider    SPINAL CORD STIMULATOR INSERTION PHASE (N/A Back) Chronic low back pain with bilateral sciatica  (Chronic low back pain with bilateral sciatica [M54.41, M54.42, G89.29]) MD Curt Parson MD          Anesthesia Type: No value filed.  Last vitals  BP   134/83 (01/08/18 1050)   Temp   97.7 °F (36.5 °C) (01/08/18 1050)   Pulse   74 (01/08/18 1050)   Resp   16 (01/08/18 1050)     SpO2   92 % (01/08/18 1050)     Post Anesthesia Care and Evaluation    Patient location during evaluation: PACU  Patient participation: complete - patient participated  Level of consciousness: awake and alert  Pain management: adequate  Airway patency: patent  Anesthetic complications: No anesthetic complications  PONV Status: none  Cardiovascular status: hemodynamically stable and acceptable  Respiratory status: nonlabored ventilation, acceptable and nasal cannula  Hydration status: acceptable

## 2018-01-08 NOTE — OP NOTE
NEUROSURGICAL OPERATIVE NOTE        PREOPERATIVE DIAGNOSIS:    Chronic bilateral lower extremity pain      POSTOPERATIVE DIAGNOSIS:  Chronic bilateral lower extremity pain      PROCEDURE:  T10 midline laminotomy for placement of St. Crow epidural spinal cord stimulator      SURGEON:  Cirilo Lemons M.D.      ASSISTANT:  Bessie Mathew PA-C      ANESTHESIA:  General      ESTIMATED BLOOD LOSS:  Minimal      SPECIMEN:  None      DRAINS:  None      COMPLICATIONS:  None      CLINICAL NOTE:  The patient is a 39-year-old woman with a protracted history of low-grade low back pain as well as severe bilateral lower extremity pain.  Studies have not revealed an operative lesion.  A trial of a spinal cord stimulator provided substantial relief for her pain and as such she presents at this time for spinal cord stimulator placement.  The nature of the procedure as well as the potential risks, complications, limitations, and alternatives to the procedure were discussed at length with the patient and the patient has agreed to proceed with surgery.      TECHNICAL NOTE:  The patient was brought to the operating room and while on her cart general endotracheal anesthesia was achieved. She was then turned prone onto blanket rolls which were maintained longitudinally under her chest and abdomen. Her dorsal back and flanks were prepared and draped in the usual fashion. The C-arm was brought into use and the T10-11 level was localized and a midline incision was fashioned measuring approximately 3-4 cm. Underlying tissues were divided with cautery to provide exposure to the T10-11 posterior spinal elements. Another radiograph confirmed the operative level. Leksell rongeur, Midas Sidney drill and Kerrison punches were utilized to fashion a central laminotomy on the inferior aspect of T10. Bleeding points were controlled with bone wax and FloSeal. The dorsal epidural space was defined in a cephalad fashion utilizing a Penfield #3. The St. Crow  Penta lead was then advanced in several passes ultimately into the midline with the upper aspect of the lead projecting to the upper endplate of T8, as desired. Lead tethers were utilized and sutured into place. A transverse incision was then made on the lower flank on the left and a subcutaneous pocket was fashioned using cautery and finger dissection. The lead passer was utilized to pass the leads from the thoracic incision to the flank incision. This was affixed to the St. Crow IPG. Impedences were all appropriate. Some vancomycin powder was sprinkled in the pocket. The IPG was placed into the pocket and secured in place with 3-0 silk sutures. The subcutaneous tissues were closed in a single layer in interrupted fashion with 2-0 Vicryl suture. The thoracic incision was washed out with an antibiotic-containing solution. Some vancomycin powder was sprinkled in this wound as well, and the paraspinous muscle and fascia were reapproximated in interrupted fashion with #0 Vicryl suture. The subcutaneous tissues were closed in layers with 3-0 Vicryl suture.  The skin at each site was closed in a running subcuticular fashion with a Stratafix. Prineo dressing was applied at each site. Completion fluoroscopy confirmed good positioning of the lead as desired, projecting to the superior end plate of T8. The patient was subsequently rolled onto her cart, extubated, and taken to the recovery room in satisfactory condition. She did receive preoperative antibiotics and there were no overt intraoperative complications.             Cirilo Lemons M.D.

## 2018-01-09 ENCOUNTER — TELEPHONE (OUTPATIENT)
Dept: ONCOLOGY | Facility: HOSPITAL | Age: 43
End: 2018-01-09

## 2018-01-09 ENCOUNTER — APPOINTMENT (OUTPATIENT)
Dept: MRI IMAGING | Facility: HOSPITAL | Age: 43
End: 2018-01-09
Attending: NEUROLOGICAL SURGERY

## 2018-01-09 NOTE — TELEPHONE ENCOUNTER
Phoned to patient today, on behalf of Dr. Pinto.  Pt had nerve stimulator procedure yesterday, 1/8; Avastin treatment needs to be delayed at least 4 weeks.  Spoke with patient and canceled 1/17 treatment and MD follow up with Dr. Pinto.  Appointments rescheduled for Thursday, 2/8/18 at 9:30am for labs, treatment, and to see Dr. Pinto.  Encouraged for pt to call with any concerns or needs prior to appointment.  Pt verbalized understanding, and is in agreement with plan.

## 2018-01-24 NOTE — PROGRESS NOTES
"    [facility]    Patient: Marcelina Alvarez      Date: 18  : 1975   MRN: [id number]  ______________________________________________________________________      CHIEF COMPLIANT:   Chronic bilateral lower extremity pain    HISTORY OF PRESENT ILLNESS  Ms. Alvarez is a 43 y.o. who presents today for postop follow up.  Pre-operatively, she presented with a protracted history of low-grade low back pain as well as severe bilateral lower extremity pain  A recent trial of an epidural spinal cord stimulator provided substantial relief for her pain syndrome.  On 17, she undewent an uncomplicated T10 midline laminotomy for placement of [place] epidural spinal cord stimulator.       Postoperative course has been uneventful.  Her incision is healing well without drainage, redness, swelling, or pain at incision site.      She reports no wound drainage, redness, swelling, or pain at incision site.  No fevers, chills, malaise, or headaches.   She is being followed by pain doctor and device rep for device management.      The patient's past medical history, past surgical history, family history, and social history have been reviewed at length in the electronic medical record.      Past Medical History:   Diagnosis Date   • Anxiety and depression    • Arthritis     RIGHT FOOT \"DUE TO FOUR STRESS FRACTURES\"    • Brain tumor ,,2017    SURGERY x3, CHEMO AND RADIATON    • Diabetes mellitus     DX'D TYPE II NIDDM APPROX 6 WEEKS AGO, \"CAUSED BY CHEMO\" CHECKS BS -150 IN AM -275 PM    • Eczema    • Frequent headaches    • GERD (gastroesophageal reflux disease)    • History of shingles     MULTIPLE OUTBREAKS--NO OPEN AREAS AT THIS TIME \"SHOW UP JUST ABOVE BUTTOCKS\"    • Hypertension     CONTROLLED WITH MEDS PER PT    • IBS (irritable bowel syndrome)    • Low back pain    • Nerve damage     from shingles   • PONV (postoperative nausea and vomiting)    • " Prophylactic chemotherapy     receives chemotherapy regularly due to brain tumor recurrence    • Sinus disease    • Wears reading eyeglasses      Past Surgical History:   Procedure Laterality Date   • BREAST SURGERY      REDUCTION    • CHOLECYSTECTOMY     • CRANIOTOMY FOR TUMOR Right 6/21/2017    Procedure: CRANIOTOMY FOR TUMOR STEREOTACTIC WITH STEALTH; 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 Gregorio)   • CRANIOTOMY FOR TUMOR  08/31/2007    CRANI & EXCISION OF LOW GRADE GLIOMA & LUMBAR DRAIN (Dr. Gregorio)   • ENDOMETRIAL ABLATION     • PORTACATH PLACEMENT       Social History     Social History   • Marital status:      Spouse name: N/A   • Number of children: N/A   • Years of education: N/A     Occupational History   • Not on file.     Social History Main Topics   • Smoking status: Never Smoker   • Smokeless tobacco: Never Used   • Alcohol use No   • Drug use: No   • Sexual activity: Defer     Other Topics Concern   • Not on file     Social History Narrative     Ms. Alvarez has a current medication list which includes the following prescription(s): accu-chek jonn plus, accu-chek softclix lancets, aripiprazole, bevacizumab, clonazepam, ibuprofen, lidocaine-prilocaine, lisinopril-hydrochlorothiazide, ranitidine, sertraline, sitagliptin, valacyclovir, zolpidem, cyclobenzaprine, gabapentin, and hydrocodone-acetaminophen, and the following Facility-Administered Medications: mupirocin. See EMR for details.  She is allergic to morphine and related and diamox [acetazolamide].      Review of Systems   Constitutional: Negative for activity change, appetite change, chills, diaphoresis, fatigue, fever and unexpected weight change.   HENT: Negative for congestion, dental problem, drooling, ear discharge, ear pain, facial swelling, hearing loss, mouth sores, nosebleeds, postnasal  "drip, rhinorrhea, sinus pressure, sneezing, sore throat, tinnitus, trouble swallowing and voice change.    Eyes: Negative for photophobia, pain, discharge, redness, itching and visual disturbance.   Respiratory: Positive for shortness of breath. Negative for apnea, cough, choking, chest tightness, wheezing and stridor.    Cardiovascular: Negative for chest pain, palpitations and leg swelling.   Gastrointestinal: Negative for abdominal distention, abdominal pain, anal bleeding, blood in stool, constipation, diarrhea, nausea, rectal pain and vomiting.   Endocrine: Negative for cold intolerance, heat intolerance, polydipsia, polyphagia and polyuria.   Genitourinary: Negative for decreased urine volume, difficulty urinating, dysuria, enuresis, flank pain, frequency, genital sores, hematuria and urgency.   Musculoskeletal: Negative for arthralgias, back pain, gait problem, joint swelling, myalgias, neck pain and neck stiffness.   Skin: Negative for color change, pallor, rash and wound.   Allergic/Immunologic: Negative for environmental allergies, food allergies and immunocompromised state.   Neurological: Negative for dizziness, tremors, seizures, syncope, facial asymmetry, speech difficulty, weakness, light-headedness, numbness and headaches.   Hematological: Negative for adenopathy. Does not bruise/bleed easily.   Psychiatric/Behavioral: Negative for agitation, behavioral problems, confusion, decreased concentration, dysphoric mood, hallucinations, self-injury, sleep disturbance and suicidal ideas. The patient is not nervous/anxious and is not hyperactive.         OBJECTIVE  Vitals: Blood pressure 124/86, pulse 89, temperature 97.7 °F (36.5 °C), height 157.5 cm (62\"), weight 112 kg (247 lb), not currently breastfeeding.  Body mass index is 45.18 kg/(m^2).    Physical Exam  Well developed, well nourished female in no acute distress.    She is alert and cooperative.  Mood and affect appropriate.  Breathing unlabored. "   Motor strength is intact in the lower extremities.  Station and gait are normal.  Ambulates independently without assistance  She is able to rise from sitting to standing position without difficulty.  Skin incision is well approximated at appropriate stage of healing without warmth, drainage, or erythema.      Medical Decision Making    ASSESSMENT  Chronic bilateral lower extremity pain s/p spinal cord stimulator placement    DISCUSSION  Ms. Alvarez's incision is healing well with no s/s of infection.   Reviewed activity recommendations, home exercises, and wound care with patient today.  Continue supportive measures as needed.  Continue to f/up as scheduled with pain mgmt and device rep.    She will follow up with our clinic as needed.  She should call or RTC sooner if symptoms worsen or new symptoms develop.        TAYLOR Delgado-C  [facility]  01/24/18    ______________________________________________  Patient Care Team:  TAYLOR Heard as PCP - General (Physician Assistant)  JEFFY Pinto MD as PCP - Claims Attributed  David Gregorio MD as Consulting Physician (Neurosurgery)  Lisseth Mckeon MD as Consulting Physician (Radiation Oncology)  JEFFY Pinto MD as Consulting Physician (Hematology and Oncology)  Burton Heaton MD as Consulting Physician (Pain Medicine)

## 2018-02-08 NOTE — PROGRESS NOTES
Name:  Marcelina Alvarez  :  1975  Date:  2018     REFERRING PHYSICIAN  David Gregorio MD    PRIMARY CARE PROVIDER  TAYLOR Heard    REASON FOR FOLLOWUP  1. Glioma      CHIEF COMPLAINT  Manageable headaches, fatigue and nausea for a few days after each cycle of Avastin, currently resolved. Much improved chronic back pain, status post stimulator placement in early 2018.    Dear Dr. Gregorio,    HISTORY OF PRESENT ILLNESS:   I saw Ms. Alvarez in follow up today in our medical oncology clinic. 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 She was initially diagnosed in , and you performed a successful resection. She subsequently underwent localized XRT (but no chemotherapy). She did very well until , when she developed a localized recurrence; and, again, you performed a successful resection (this time without adjuvant XRT). She was again doing well until late , when she started to develop some mild forgetfulness and headaches; and a repeat MRI showed evidence of recurrent disease again. This time, it was felt that repeat surgery was not in her best interest (at least at that time). She underwent Cyberknife treatment in early 2017 and tolerated it well. She was subsequently referred to our clinic to consider chemotherapy (Temodar). At the time of her initial appointment with us (on 2017), she was agreeable to this treatment (patient dose: 320 mg on days 1-5 of a 28-day cycle). She did overall well on this regimen for several months; however, unfortunately, repeat imaging in May 2017 showed evidence of disease reprogression. She underwent a repeat craniotomy for debulking in early summer 2017 and has been doing overall well since then on second-line palliative therapy with [id number] Avastin.    INTERIM HISTORY:  Ms. Alvarez returns to clinic today for follow up by herself. She  "began palliative [id number] Avastin on 08/16/2017, has received a total of eight (8) cycles to date and is overall still tolerating them well. She most recently underwent a repeat neurosurgery and MRI evaluation in mid-November 2017, which showed no evidence of disease progression. The Avastin had to be held for the past six weeks or so in order for her to undergo the planned placement of an epidural spinal cord stimulator (for chronic, worsening low back pain). This was successfully done on 01/08/2018, and she states today that this procedure has helped \"immensely\". She overall feels well and is ready to resume her Avastin therapy today.    Past Medical History:   Diagnosis Date   • Anxiety and depression    • Arthritis     RIGHT FOOT \"DUE TO FOUR STRESS FRACTURES\"    • Brain tumor 2010,2014,2017    SURGERY x3, CHEMO AND RADIATON    • Diabetes mellitus     DX'D TYPE II NIDDM APPROX 6 WEEKS AGO, \"CAUSED BY CHEMO\" CHECKS BS -150 IN AM -275 PM    • Eczema    • Frequent headaches    • GERD (gastroesophageal reflux disease)    • History of shingles     MULTIPLE OUTBREAKS--NO OPEN AREAS AT THIS TIME \"SHOW UP JUST ABOVE BUTTOCKS\"    • Hypertension     CONTROLLED WITH MEDS PER PT    • IBS (irritable bowel syndrome)    • Low back pain    • Nerve damage     from shingles   • PONV (postoperative nausea and vomiting)    • Prophylactic chemotherapy     receives chemotherapy regularly due to brain tumor recurrence    • Sinus disease    • Wears reading eyeglasses        Past Surgical History:   Procedure Laterality Date   • BREAST SURGERY      REDUCTION    • CHOLECYSTECTOMY     • CRANIOTOMY FOR TUMOR Right 6/21/2017    Procedure: CRANIOTOMY FOR TUMOR STEREOTACTIC WITH STEALTH; planned right frontal lobectomy for astrocytoma progression;  Surgeon: David Gregorio MD;  Location: [facility];  Service:    • CRANIOTOMY FOR TUMOR  12/05/2014    CRANI FOR GLIOMA (Dr. Gregorio)   • CRANIOTOMY FOR TUMOR  08/31/2007    CRANI & " EXCISION OF LOW GRADE GLIOMA & LUMBAR DRAIN (Dr. Gregorio)   • ENDOMETRIAL ABLATION     • PORTACATH PLACEMENT         Social History     Social History   • Marital status:      Spouse name: N/A   • Number of children: N/A   • Years of education: N/A     Occupational History   • Not on file.     Social History Main Topics   • Smoking status: Never Smoker   • Smokeless tobacco: Never Used   • Alcohol use No   • Drug use: No   • Sexual activity: Defer     Other Topics Concern   • Not on file     Social History Narrative       Family History   Problem Relation Age of Onset   • Hypertension Father      pulmonary       Allergies   Allergen Reactions   • Morphine And Related Hallucinations   • Diamox [Acetazolamide] Rash       Current Outpatient Prescriptions   Medication Sig Dispense Refill   • ACCU-CHEK JACOB PLUS test strip USE BID  3   • ACCU-CHEK SOFTCLIX LANCETS lancets      • ARIPiprazole (ABILIFY) 5 MG tablet Take 5 mg by mouth Daily.     • bevacizumab (AVASTIN) 100 MG/4ML chemo injection Infuse  into a venous catheter.     • clonazePAM (KlonoPIN) 1 MG tablet Take 1 mg by mouth 2 (Two) Times a Day As Needed.     • cyclobenzaprine (FLEXERIL) 5 MG tablet TK 1 T PO  Q 6 H PRF MUSCLE SPASMS  0   • gabapentin (NEURONTIN) 800 MG tablet Take 800 mg by mouth 3 (Three) Times a Day.     • HYDROcodone-acetaminophen (NORCO) 7.5-325 MG per tablet Take 1 tablet by mouth Every 6 (Six) Hours As Needed for Moderate Pain  (Pain). 25 tablet 0   • ibuprofen (ADVIL,MOTRIN) 800 MG tablet Take 800 mg by mouth Every 8 (Eight) Hours As Needed for Mild Pain .     • lidocaine-prilocaine (EMLA) 2.5-2.5 % cream Apply 1 hour prior to VAD access (Patient taking differently: 1 application As Needed (before port access for chemo). Apply 1 hour prior to VAD access) 30 g 5   • lisinopril-hydrochlorothiazide (PRINZIDE,ZESTORETIC) 20-12.5 MG per tablet Take 1 tablet by mouth Daily.     • ranitidine (ZANTAC) 150 MG capsule Take 150 mg by mouth 2  (Two) Times a Day.     • sertraline (ZOLOFT) 100 MG tablet Take 150 mg by mouth Daily.     • SITagliptin (JANUVIA) 100 MG tablet Take 100 mg by mouth Daily.     • valACYclovir (VALTREX) 500 MG tablet Take 1,000 mg by mouth As Needed (shingles).     • zolpidem (AMBIEN) 5 MG tablet Take 5 mg by mouth Every Night.       No current facility-administered medications for this visit.      Facility-Administered Medications Ordered in Other Visits   Medication Dose Route Frequency Provider Last Rate Last Dose   • mupirocin (BACTROBAN) 2 % nasal ointment   Nasal BID Ирина Interiano PA-C           REVIEW OF SYSTEMS  CONSTITUTIONAL:  No fever, chills or night sweats. Intermittent fatigue.  EYES:  No blurry vision, diplopia or other vision changes.  ENT:  No hearing loss, nosebleeds or sore throat.  CARDIOVASCULAR:  No palpitations, arrhythmia, syncopal episodes or edema.  PULMONARY:  No hemoptysis, wheezing, chronic cough or shortness of breath.  GASTROINTESTINAL:  No constipation or diarrhea.  No abdominal pain. Occasional nausea (though not as much as when she was on Temodar), controlled with prn antiemetics.  GENITOURINARY:  No hematuria, kidney stones or frequent urination.  MUSCULOSKELETAL:  Chronic back pains; nerve stimulation procedure planned next week.  INTEGUMENTARY: No rashes or pruritus.  ENDOCRINE:  No excessive thirst or hot flashes.  HEMATOLOGIC:  No history of free bleeding, spontaneous bleeding or clotting.  IMMUNOLOGIC:  No allergies or frequent infections.  NEUROLOGIC: Chronic nerve pain in the lower back, now much improved status post epidural spinal cord stimulator in early January 2018. Intermittent headaches, as per the HPI above.  PSYCHIATRIC:  No anxiety or depression.    PHYSICAL EXAMINATION    /73  Pulse 95  Temp 97.3 °F (36.3 °C) (Oral)   Resp 18  Wt 113 kg (248 lb 12.8 oz)  SpO2 98%  BMI 45.51 kg/m2    GENERAL:  A well-developed, well-nourished, obese, white female in no acute  "distress.  HEENT:  Pupils equally round and reactive to light.  Extraocular muscles intact.  CARDIOVASCULAR:  Regular rate and rhythm.  No murmurs, gallops or rubs.  LUNGS:  Clear to auscultation bilaterally.  ABDOMEN:  Soft, nontender, nondistended with positive bowel sounds.  : Deferred.  EXTREMITIES:  No clubbing, cyanosis or edema bilaterally.  SKIN:  No rashes or petechiae.  NEURO:  Cranial nerves grossly intact.  No focal deficits.  PSYCH:  Alert and oriented x3.    LABORATORY    Lab Results   Component Value Date    WBC 12.56 (H) 01/07/2018    HGB 15.6 (H) 01/07/2018    HCT 45.8 (H) 01/07/2018    MCV 81.1 01/07/2018     01/07/2018    NEUTROABS 7.74 01/07/2018       Lab Results   Component Value Date     01/07/2018    K 4.1 01/07/2018     01/07/2018    CO2 24.0 01/07/2018    BUN 14 01/07/2018    CREATININE 0.80 01/07/2018    GLUCOSE 133 (H) 01/07/2018    CALCIUM 9.4 01/07/2018    AST 39 (H) 11/08/2017    ALT 65 (H) 11/08/2017    ALKPHOS 65 11/08/2017    BILITOT 0.6 11/08/2017    PROTEINTOT 7.3 11/08/2017    ALBUMIN 4.20 11/08/2017       CBC (01/07/2018): WBCs: 12.56; HgB: 15.6; Hct: 45.8; platelets: 248  CBC (05/31/2017): WBCs: 10.27; HgB: 15.8; Hct: 44.7; platelets: 289  CBC (05/03/2017): WBCs: 7.8; HgB: 16.3; Hct: 45.7; platelets: 341  CBC (03/23/2017): WBCs: 5.6; HgB: 15.2; Hct: 43.6; platelets: 249  CBC (03/13/2017): WBCs: 10.0; HgB: 15.7; Hct: 45.0; platelets: 314  CBC (02/23/2017): WBCs: 6.4; HgB: 15.4; Hct: 43.9; platelets: 248    IMAGING  MRI brain with and without contrast (06/13/2016):  Impression: There are bilateral frontal changes related to prior surgery, right greater than left. There is some associated gliosis and minimal contrast enhancement in the superior leftward aspect of the surgical \"bed\". Most importantly, there has been no change since 02/22/2016.    MRI cyberknife brain with contrast (12/28/2016):  Impression: Areas of increased blood flow in the right frontal " lobe in areas of abnormal contrast enhancement and therefore the abnormality seen on prior MRI is highly suspicious for tumor.    MRI brain with and without contrast (02/28/2017):  Impression: Slight interval increase in the size of the area of abnormal contrast enhancement diffusely throughout the right frontal region. The amount of surrounding edema is also increased in the interval. Findings may be related to progression of disease however postradiation changes cannot be excluded. The edema extends into the right basal ganglia and parietal lobe. No new area of abnormal contrast enhancement identified. [Per a neurosurgery read and evaluation that day, the intensity of the enhancement is somewhat lessened, and the flare appears to be about the same.]    MRI brain with and without contrast (04/27/2017):  Impression: Increased cerebral blood volume in the area of abnormal contrast enhancement most consistent with neovascularity and tumor progression. The size and surrounding edema is stable.    MRI brain with and without contrast (05/30/2017):  Impression:  1) Intense, enhancing lesion right frontal lobe with interval evidence of increasing mass effect and edema with increasing midline shift from right to left and increasing compression of the anterior horn right lateral ventricle. The size of the enhancing lesion is very slightly larger by careful comparison and measurement. The degree of overall edema in the right frontal lobe has increased and now crosses the midline to the left frontal lobe. There is midline shift right to left which has increased somewhat since previous studies.  2) Therefore, the findings in the right frontal lobe are slowly progressive by multiple parameters. A distant lesion is not identified elsewhere.    CT cerebral perfusion with and without contrast (05/30/2017):  Impression:  1) Large mass lesion right frontal lobe exhibiting marked increase in permeability. There is mass effect and  edema diffusely in the right frontal lobe with a high grade defect of cerebral blood flow with sporadic areas of preserved cerebral blood volume.  2) There is marked delay in the mean transit time and time to drain in the right frontal tumor and mass region.  3) No other cerebral insults, ischemic abnormalities or parametric map abnormalities are identified elsewhere, outside of the large right frontal mass.    MRI brain with and without contrast (09/19/2017):  Impression:  1) Continued, interval decrease in right frontal lobe postoperative findings with overall decreased vasogenic edema and hemorrhage from prior. No acute hemorrhage or enhancing soft tissue focus to suggest residual enhancing tumor. No evidence for distant, enhancing lesion.  2) No acute intracranial pathology.    MRI brain with and without contrast (11/14/17)  Impression:  1. Complex septated cystic mass with perimeter enhancement right frontal  region surrounded by gliotic increased FLAIR and T2 signal radiating  into the contralateral left frontal lobe and surrounding the mass in the  right frontal lobe. 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  2. The size of the right frontal cystic lesion, the degree of perimeter  enhancement and the overall degree of abnormal FLAIR and T2 signal in  the frontal lobes is stable without change or progression when compared  to previous studies of 09/19/2017.  3. Further, new or enhancing lesion elsewhere is not currently  identified. Therefore, the findings in the brain, more specifically in  the frontal lobes, are stable when compared to the most recent studies.    IMPRESSION AND PLAN  Ms. Alvarez is a 43 y.o., white female with:  1. Glioma: Initially diagnosed in 2007, with recurrences in 2014, late 2016, and, most recently, summer 2017. Now status post resection x 3 (in 2007, 2014 and 06/21/2017) and localized radiation x 2 (adjuvantly in 2007; and, most recently, a Cyberknife boost  delivered in January 2017). Since a third resection was felt to not be in her best interest (at least at the time, in early 2017) and she completed localized XRT, we agreed with neurosurgery's recommendation to start chemotherapy. She began Temodar (patient dose: 320 mg daily on days 1-5 of a 28 day cycle) by late January 2017 and completed five (5), qmonthly cycles. She tolerated this regimen overall well, with some mild, and manageable nausea and increasing fatigue her only noticeable side effects. With this treatment, her CBCs remained unremarkable with no developing cytopenias. Unfortunately, despite this therapy, she developed reworsening headaches; and a repeat MRI in late May 2017 was consistent with reprogressive disease. As a result, she underwent a repeat craniotomy on 06/21/17. She tolerated this debulking surgery well, and she was subsequently started on second-line, palliative therapy with [id number] Avastin. She began this treatment on 08/16/2017, has received eight (8) cycles to date and continues to tolerate this it overall very well. The most recent repeat MRI of the brain (performed on 11/14/2017 and summarized above) again showed no evidence of disease progression. Due to the recent placement of an epidural, spinal cord stimulator (to treat her chronic, recently worsening back pain, see below), the bevacizumab has had to be held for the past six weeks or so. As it has now been about a month since her procedure, we will resume this treatment regimen today (with the ninth cycle). She will follow up with neurosurgery with a repeat MRI again on 02/20/2018. We will see her back in our clinic in two weeks, on her next bevacizumab treatment day (day of the tenth cycle), shortly after this appointment.  2. Chronic back pain: As discussed above, her bevacizumab was recently held for about six weeks to allow placement of an epidural spinal cord stimulator. This was successfully done on 01/08/2018, to good  palliative effect. Continue to follow up with neurosurgery, as previously planned.  3. Nausea: Continue prn Zofran, compazine and dramamine. Continue to monitor.  The patient was in agreement with these plans.    It is a pleasure to participate in Ms. Devlin's care. Please do not hesitate to call with any questions or concerns that you may have.    A total of 30 minutes were spent coordinating this patient’s care in clinic today; more than 50% of this time was spent face-to-face with the patient, reviewing her interim medical history and counseling on the current treatment and followup plan. All questions were answered to her satisfaction.    FOLLOW UP  Resume palliative, [id number] bevacizumab (Avastin) as planned (9th cycle today). With neurosurgery on 02/20/2018 with a repeat MRI of the brain, as previously planned. Return to our clinic in 2 weeks (on the day of the 10th cycle of Avastin).      This document was electronically signed by JEFFY Pinto MD February 8, 2018 11:06 AM      CC: MD Cirilo Becerra MD Marta Hayne, MD Beverly Paige Neace, PA

## 2018-02-13 NOTE — PROGRESS NOTES
"Chief Complaint: \"I am doing great with the stimulator device.\"    Brief History: Mrs. Marcelina Alvarez is a 43 y.o. female, who underwent implantation of a spinal cord stimulator device with Dr. Cirilo Lemons on 01/08/2017 with [employer] Penta paddle lead (Full Body MRI compatible) with the top electrodes projecting at the level of the superior endplate of the T8 vertebral level. IPG:[employer] Proclaim 7 IPG Non-Rechargeable (Full Body MRI compatible). The device was implanted primarily for treatment of lower back and bilateral lower extremity pain. Patient returns to the clinic for evaluation of her chronic pain and possible spinal cord stimulator reprogramming. Patient has remained afebrile and surgical wounds are well healed and without erythema, drainage, or fluid accumulation.   Ms. Marcelina Alvarez reports % relief along with remarkable functional improvement with the use of her stimulator device. Patient reports significant relief of her previously severe neurogenic claudication.   Pain level is rated as 1/10 with the stimulator \"turned on.”  Patient level ranges from 0/10 to 2/10 with the use of the spinal cord stimulator device.    Patient denies pain, numbness and weakness in the lower extremities.  Patient denies  any new bladder or bowel problems.     Pain History Prior to SCS Implant:  Referring physician: Dr. David Gregorio  Reason for referral: Consultation for intractable chronic lower back pain secondary to postherpetic neuralgia.   Pain history: Patient reports a 9-year history of pain, which began after multiple (greater than 70) cases of shingles. Pain has progressed in intensity over the past 4 months. Her last outbreak was 6 weeks ago. Lesions are centered around the spine without spread into her legs.  Pain description: constant pain with intermittent exacerbation, described as aching and burning sensation.   Radiation of pain: The pain radiates into the posterior aspect of the gluteal, " lateral aspect of the hips, lateral aspect of the thighs, lateral aspect of the legs, lateral aspect of the ankles and plantar and dorsal aspect of the feet.  Pain intensity today: 6/10  Average pain intensity last week: 9/10  Pain intensity ranges from: 5/10 to 10/10  Aggravating factors: Pain increases with heat.  Alleviating factors: Pain decreases with nothing.   Associated symptoms:   Patient reports pain, numbness and sometimes weakness in the bilateral lower extremities.      Review of previous therapies and additional medical records:  Marcelina Alvarez has already failed the following measures, including:   Conservative measures: oral analgesics (extensive trials with oral analgesics including but not limited to gabapentin, Lyrica, opioids, amitriptyline),  physical therapy, heat, changing beds, steroid shots every 3 months that helps only for a few days.   Interventional measures: SCS trial, as referenced previously  Surgical measures: No previous lumbar spine surgery  Marcelina Alvarez underwent neurosurgical  consultation with Dr. David Gregorio and was found not to be a surgical candidate.  Marcelina Alvarez presents with significant comorbidities including anxiety and depression, engaged in treatment, morbid obesity, hypertension, non-insulin dependent diabetes, PHN, GERD, headaches, astrocytoma, glioma, osteoarthritis, engaged in treatment.  In terms of current analgesics, Marcelina Alvarez takes:  gabapentin, ibuprofen   I have reviewed Felipe Report #[id number] consistent to medication reconciliation.  Patient is very satisfied with the trial and would like to move forward with implantation of a spinal cord stimulator device.     Diagnostic Studies:    MRI LUMBAR SPINE WO CONTRAST- 11/24/2017. Mild increased lumbar lordosis. Otherwise, vertebral marrow signal alteration, fracture, subluxation  or intramedullary lesion is not identified. There is no evidence of dominant dural sac defect, lateralizing impingement or  acute focal abnormality.  MRI THORACIC SPINE WO CONTRAST- 11/24/2017. Mild focal degenerative disc and arthropathic disease noted at T8-T9 producing mild dural sac effacement without dominant defect or lateralization. Slight increased thoracolumbar kyphosis. Otherwise, negative MR datasets of the thoracic spine thoracic cord and the epidural space.   MRI BRAIN W WO CONTRAST- 11/14/2017. COMPARISON: Comparison is made to previous studies of 2 months ago, 09/19/2017.  FINDINGS: There is a complex septated cystic lesion in the right frontal area anterior to the rightward aspect of the genu of the corpus callosum and anterior to the anterior horn of the right lateral ventricle. This cystic mass is surrounded by reactive gliosis with increased T2 and FLAIR signal extending in both the right frontal and left frontal lobes across the midline. 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 On the enhanced datasets, there is perimeter enhancement of this cystic mass, however, the configuration size and degree of enhancement is stable. There is dural enhancement of the right frontal reflection, also stable. Enhancing lesion elsewhere is not currently identified. No other acute focal abnormalities are identified within the brain. There is no extracerebral collection or midline shift. The FLAIR datasets are  negative for advanced white matter disease. The flow-voids are within normal limits. The patient has ballooning of the sella turcica with near empty sella with marked pituitary atrophy, a finding that is stable from previous exams.  There is no evidence of acute restricted diffusion.  IMPRESSION: Complex septated cystic mass with perimeter enhancement right frontal region surrounded by gliotic increased FLAIR and T2 signal radiating into the contralateral left frontal lobe and surrounding the mass in the right frontal lobe.  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 The size of the right frontal cystic lesion, the degree of perimeter enhancement and the overall degree of abnormal FLAIR and T2 signal in the frontal lobes is stable without change or progression when compared to previous studies of 09/19/2017. Further, new or enhancing lesion elsewhere is not currently identified. Therefore, the findings in the brain, more specifically in the frontal lobes, are stable when compared to the most recent studies.      The following portions of the patient's history were reviewed and updated as appropriate: problem list, past medical history, past surgery history, social history, family history, medications, and allergies    Review of Systems    /82  Pulse 98  Temp 98.2 °F (36.8 °C) (Temporal Artery )   Resp 16  Ht 157.5 cm (62\")  Wt 113 kg (249 lb)  SpO2 98%  BMI 45.54 kg/m2      Physical Exam   Neurologic Exam  Constitutional: Patient is oriented to person, place, and time. Vital signs are normal. Patient appears well-developed and well-nourished.   HENT: Head: Normocephalic and atraumatic. Eyes: Conjunctivae and lids are normal. Pupils: Equal, round, reactive to light.   Neck: Trachea normal. Neck supple. No JVD present.   Pulmonary Respiratory effort: No increased work of breathing or signs of respiratory distress. Auscultation of lungs: Clear to auscultation.   Cardiovascular Auscultation of heart: Normal rate and rhythm, normal S1 and S2, no murmurs.   Abdomen: The abdomen was soft and nontender. Bowel sounds were normal.   Musculoskeletal   Gait and station: Gait evaluation demonstrated a normal gait   Lumbar spine: The range of motion of the lumbar spine is full and without pain. Extension, flexion, lateral flexion, rotation of the lumbar spine did not increase or reproduce pain. Lumbar facet joint loading maneuvers are negative.   Bashir and Gaenslen's tests are negative   Piriformis maneuvers are " negative   Palpation of the bilateral ischial tuberosities, unrevealing   Palpation of the bilateral greater trochanter, unrevealing   Examination of the Iliotibial band: unrevealing   Hip joints: The range of motion of the hip joints is full and without pain   Neurological: Patient is alert and oriented to person, place, and time. Speech: speech is normal. Cortical function: Normal mental status.   Cranial nerves: Cranial nerves 2-12 intact.   Reflex Scores:  Right patellar: 2+  Left patellar: 2+  Right achilles: 1+  Left achilles: 1+  Motor strength: 5/5  Motor Tone: normal tone.   Involuntary movements: none.   Superficial/Primitive Reflexes: primitive reflexes were absent.   Right Smith: absent  Left Smith: absent  Right ankle clonus: absent  Left ankle clonus: absent   Spurling sign is negative. Lhermitte sign is negative. Negative long tract signs. Straight leg raising test is negative. Femoral stretch sign is negative.   Sensation: No sensory loss. Sensory exam: intact to light touch, intact pain and temperature sensation, intact vibration sensation and normal proprioception. There is some hyperalgesia and hyperesthesia in the lumbar region  Coordination: Normal finger to nose and heel to shin. Normal balance and negative. Romberg's sign negative.   Skin and subcutaneous tissue: Skin is warm and intact. No rash noted. No cyanosis. Both surgical wounds look unremarkable  Psychiatric: Judgment and insight: Normal. Orientation to person, place and time: Normal. Recent and remote memory: Intact. Mood and affect: Normal.     PROCEDURE: Analysis of the spinal cord stimulator device without spinal cord stimulator reprogramming   Patient used the [employer] spinal cord stimulator device for 864 hours since last reprogramming, and 864 lifetime hours (average 24 hours per day).   Analysis of impedence reveals normal impedence for all contacts.    Program ONE   Electrode polarities: 4+, 6-, 12+, 14-  Amplitude:  0.4-1.5 mA     Pulse width: 1000 mcs  Rate: 40 Hz  IntraBurst rate: 500 Hz  Micro-dosin:90    Patient experienced significant pain relief with coverage with pleasant paresthesia in all areas of chronic pain.  Walking ability test performed revealing significant improvement of her previously severe neurogenic claudication.  A copy of the telemetry report will be scanned in the patient's chart.    ASSESSMENT:   1. Postherpetic neuralgia    2. Type 2 diabetes mellitus with complication, without long-term current use of insulin    3. Glioma    4. Portacath in place    5. Astrocytoma    6. Morbid obesity    7. Anxiety and depression      PLAN:  Patient's chronic pain condition is significantly improved since implantation of her SCS device. Therefore, I have proposed the following plan:  1. Follow up in about 6-8 weeks   2. Long-term rehabilitation efforts:  A. The patient does not have a history of falls. I did complete a risk assessment for falls.   B. Start a comprehensive physical therapy program for reconditioning, therapeutic exercise, core strengthening, gait and balance training, neurodynamics and desensitization techniques   B. Start an exercise program such as water therapy  C. Referral to [facility]  D. Referral to endocrinology, if possible in [place], in consultation for diabetes and obesity  3. The patient has been instructed to contact my office with any questions or difficulties. The patient understands the plan and agrees to proceed accordingly.      Patient Care Team:  TAYLOR Heard as PCP - General (Physician Assistant)  JEFFY Pinto MD as PCP - Claims Attributed  David Gregorio MD as Consulting Physician (Neurosurgery)  Lisseth Mckeon MD as Consulting Physician (Radiation Oncology)  JEFFY Pinto MD as Consulting Physician (Hematology and Oncology)  Burton Heaton MD as Consulting Physician (Pain Medicine)  iCrilo Lemons MD as Consulting  Physician (Neurosurgery)  Bessie Mathew PA-C as Physician Assistant (Physician Assistant)     No orders of the defined types were placed in this encounter.        Future Appointments  Date Time Provider Department Center   2/20/2018 10:00 AM [facility] 3T  [facility]   2/20/2018 2:20 PM David Gregorio MD [facility] None   2/22/2018 11:00 AM [facility]   2/22/2018 11:00 AM  [facility] OP INFUS CHAIR 13  [facility] INF [facility]   2/22/2018 1:15 PM JEFFY Pinto MD [facility]         Burton Heaton MD    EMR Dragon/Transcription disclaimer:  Much of this encounter note is an electronic transcription of spoken language to printed text. Electronic transcription of spoken language may permit erroneous, or at times, nonsensical words or phrases to be inadvertently transcribed. Although I have reviewed the note for such errors, some may still exist.

## 2018-03-22 NOTE — PROGRESS NOTES
Name:  Marcelina Alvarez  :  1975  Date:  3/22/2018     REFERRING PHYSICIAN  David Gregorio MD    PRIMARY CARE PROVIDER  TAYLOR Heard    REASON FOR FOLLOWUP  1. Glioma      CHIEF COMPLAINT  Manageable headaches, fatigue and nausea for a few days after each cycle of Avastin, currently still resolved. Much improved chronic back pain, status post stimulator placement in early 2018. Ongoing sinus congestion for at least the past week.    Dear Dr. Gregorio,    HISTORY OF PRESENT ILLNESS:   I saw Ms. Alvarez in follow up today in our medical oncology clinic. 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 She was initially diagnosed in , and you performed a successful resection. She subsequently underwent localized XRT (but no chemotherapy). She did very well until , when she developed a localized recurrence; and, again, you performed a successful resection (this time without adjuvant XRT). She was again doing well until late , when she started to develop some mild forgetfulness and headaches; and a repeat MRI showed evidence of recurrent disease again. This time, it was felt that repeat surgery was not in her best interest (at least at that time). She underwent Cyberknife treatment in early 2017 and tolerated it well. She was subsequently referred to our clinic to consider chemotherapy (Temodar). At the time of her initial appointment with us (on 2017), she was agreeable to this treatment (patient dose: 320 mg on days 1-5 of a 28-day cycle). She did overall well on this regimen for several months; however, unfortunately, repeat imaging in May 2017 showed evidence of disease reprogression. She underwent a repeat craniotomy for debulking in early summer 2017 and has been doing overall well since then on second-line palliative therapy with [id number] Avastin.    INTERIM HISTORY:  Ms.  "Kim returns to clinic today for follow up by herself. She began palliative [id number] Avastin on 08/16/2017, has received a total of eleven (11) cycles to date and is overall still tolerating them well. She most recently underwent a repeat neurosurgery and MRI evaluation in mid-November 2017, which showed no evidence of disease progression. The Avastin had to be held for about six weeks in late 2017 in order for her to undergo the planned placement of an epidural spinal cord stimulator (for chronic, worsening low back pain). This was successfully done on 01/08/2018, and she reiterates today that this procedure has helped \"immensely\". Her only complaint today is of persistent sinus congestion that she has been unable to shake for the past week. She otherwise feels overall well and is again doing well on [id number] Avastin.    Past Medical History:   Diagnosis Date   • Anxiety and depression    • Arthritis     RIGHT FOOT \"DUE TO FOUR STRESS FRACTURES\"    • Brain tumor 2010,2014,2017    SURGERY x3, CHEMO AND RADIATON    • Diabetes mellitus     DX'D TYPE II NIDDM APPROX 6 WEEKS AGO, \"CAUSED BY CHEMO\" CHECKS BS -150 IN AM -275 PM    • Eczema    • Frequent headaches    • GERD (gastroesophageal reflux disease)    • History of shingles     MULTIPLE OUTBREAKS--NO OPEN AREAS AT THIS TIME \"SHOW UP JUST ABOVE BUTTOCKS\"    • Hypertension     CONTROLLED WITH MEDS PER PT    • IBS (irritable bowel syndrome)    • Low back pain    • Nerve damage     from shingles   • PONV (postoperative nausea and vomiting)    • Prophylactic chemotherapy     receives chemotherapy regularly due to brain tumor recurrence    • Sinus disease    • Wears reading eyeglasses        Past Surgical History:   Procedure Laterality Date   • BREAST SURGERY      REDUCTION    • CHOLECYSTECTOMY     • CRANIOTOMY FOR TUMOR Right 6/21/2017    Procedure: CRANIOTOMY FOR TUMOR STEREOTACTIC WITH STEALTH; planned right frontal lobectomy for astrocytoma " progression;  Surgeon: David Gregorio MD;  Location:  [facility];  Service:    • CRANIOTOMY FOR TUMOR  12/05/2014    CRANI FOR GLIOMA (Dr. Gregorio)   • CRANIOTOMY FOR TUMOR  08/31/2007    CRANI & EXCISION OF LOW GRADE GLIOMA & LUMBAR DRAIN (Dr. Gregorio)   • ENDOMETRIAL ABLATION     • PORTACATH PLACEMENT     • SPINAL CORD STIMULATOR IMPLANT N/A 1/8/2018    Procedure: SPINAL CORD STIMULATOR INSERTION PHASE;  Surgeon: Cirilo Lemons MD;  Location:  [facility];  Service:        Social History     Social History   • Marital status:      Spouse name: N/A   • Number of children: N/A   • Years of education: N/A     Occupational History   • Not on file.     Social History Main Topics   • Smoking status: Never Smoker   • Smokeless tobacco: Never Used   • Alcohol use No   • Drug use: No   • Sexual activity: Defer     Other Topics Concern   • Not on file     Social History Narrative   • No narrative on file       Family History   Problem Relation Age of Onset   • Hypertension Father      pulmonary       Allergies   Allergen Reactions   • Morphine And Related Hallucinations   • Diamox [Acetazolamide] Rash       Current Outpatient Prescriptions   Medication Sig Dispense Refill   • ACCU-CHEK JACOB PLUS test strip USE BID  3   • ACCU-CHEK SOFTCLIX LANCETS lancets      • ARIPiprazole (ABILIFY) 5 MG tablet Take 5 mg by mouth Daily.     • bevacizumab (AVASTIN) 100 MG/4ML chemo injection Infuse  into a venous catheter.     • clonazePAM (KlonoPIN) 1 MG tablet Take 1 mg by mouth 2 (Two) Times a Day As Needed.     • cyclobenzaprine (FLEXERIL) 5 MG tablet TK 1 T PO  Q 6 H PRF MUSCLE SPASMS  0   • gabapentin (NEURONTIN) 800 MG tablet Take 800 mg by mouth 3 (Three) Times a Day.     • HYDROcodone-acetaminophen (NORCO) 7.5-325 MG per tablet Take 1 tablet by mouth Every 6 (Six) Hours As Needed for Moderate Pain  (Pain). 25 tablet 0   • ibuprofen (ADVIL,MOTRIN) 800 MG tablet Take 800 mg by mouth Every 8 (Eight) Hours As Needed for Mild  Pain .     • lidocaine-prilocaine (EMLA) 2.5-2.5 % cream Apply 1 hour prior to VAD access (Patient taking differently: 1 application As Needed (before port access for chemo). Apply 1 hour prior to VAD access) 30 g 5   • lisinopril-hydrochlorothiazide (PRINZIDE,ZESTORETIC) 20-12.5 MG per tablet Take 1 tablet by mouth Daily.     • ranitidine (ZANTAC) 150 MG capsule Take 150 mg by mouth 2 (Two) Times a Day.     • sertraline (ZOLOFT) 100 MG tablet Take 150 mg by mouth Daily.     • SITagliptin (JANUVIA) 100 MG tablet Take 100 mg by mouth Daily.     • valACYclovir (VALTREX) 500 MG tablet Take 1,000 mg by mouth As Needed (shingles).     • zolpidem (AMBIEN) 5 MG tablet Take 5 mg by mouth Every Night.       No current facility-administered medications for this visit.      Facility-Administered Medications Ordered in Other Visits   Medication Dose Route Frequency Provider Last Rate Last Dose   • mupirocin (BACTROBAN) 2 % nasal ointment   Nasal BID Ирина Interiano PA-C           REVIEW OF SYSTEMS  CONSTITUTIONAL:  No fever, chills or night sweats. Intermittent fatigue.  EYES:  No blurry vision, diplopia or other vision changes.  ENT:  No hearing loss, nosebleeds or sore throat. Sinus congestion for at least the past week, as per the HPI above.  CARDIOVASCULAR:  No palpitations, arrhythmia, syncopal episodes or edema.  PULMONARY:  No hemoptysis, wheezing, chronic cough or shortness of breath.  GASTROINTESTINAL:  No constipation or diarrhea.  No abdominal pain. Occasional nausea (though not as much as when she was on Temodar), controlled with prn antiemetics.  GENITOURINARY:  No hematuria, kidney stones or frequent urination.  MUSCULOSKELETAL:  Chronic back pains, improved status post stimulator implantation.  INTEGUMENTARY: No rashes or pruritus.  ENDOCRINE:  No excessive thirst or hot flashes.  HEMATOLOGIC:  No history of free bleeding, spontaneous bleeding or clotting.  IMMUNOLOGIC:  No allergies or frequent  "infections.  NEUROLOGIC: Chronic nerve pain in the lower back, now much improved status post epidural spinal cord stimulator in early January 2018. Intermittent headaches, stable.  PSYCHIATRIC:  No anxiety or depression.    PHYSICAL EXAMINATION    /86   Pulse 90   Temp 97.6 °F (36.4 °C) (Oral)   Resp 20   Wt 111 kg (244 lb 6.4 oz)   SpO2 98%   BMI 44.70 kg/m²     GENERAL:  A well-developed, well-nourished, obese, white female in no acute distress.  HEENT:  Pupils equally round and reactive to light.  Extraocular muscles intact.  CARDIOVASCULAR:  Regular rate and rhythm.  No murmurs, gallops or rubs.  LUNGS:  Clear to auscultation bilaterally.  ABDOMEN:  Soft, nontender, nondistended with positive bowel sounds.  EXTREMITIES:  No clubbing, cyanosis or edema bilaterally.  SKIN:  No rashes or petechiae.  NEURO:  Cranial nerves grossly intact.  No focal deficits.  PSYCH:  Alert and oriented x3.    LABORATORY    Lab Results   Component Value Date    WBC 9.81 03/22/2018    HGB 17.0 (H) 03/22/2018    HCT 48.2 (H) 03/22/2018    MCV 79.9 (L) 03/22/2018     03/22/2018    NEUTROABS 6.44 03/22/2018       Lab Results   Component Value Date     03/08/2018    K 4.3 03/08/2018     03/08/2018    CO2 24.0 (L) 03/08/2018    BUN 13 03/08/2018    CREATININE 0.88 03/08/2018    GLUCOSE 154 (H) 03/08/2018    CALCIUM 9.3 03/08/2018    AST 39 (H) 11/08/2017    ALT 65 (H) 11/08/2017    ALKPHOS 65 11/08/2017    BILITOT 0.6 11/08/2017    PROTEINTOT 7.3 11/08/2017    ALBUMIN 4.20 11/08/2017     IMAGING  MRI brain with and without contrast (06/13/2016):  Impression: There are bilateral frontal changes related to prior surgery, right greater than left. There is some associated gliosis and minimal contrast enhancement in the superior leftward aspect of the surgical \"bed\". Most importantly, there has been no change since 02/22/2016.    MRI cyberknife brain with contrast (12/28/2016):  Impression: Areas of increased " blood flow in the right frontal lobe in areas of abnormal contrast enhancement and therefore the abnormality seen on prior MRI is highly suspicious for tumor.    MRI brain with and without contrast (02/28/2017):  Impression: Slight interval increase in the size of the area of abnormal contrast enhancement diffusely throughout the right frontal region. The amount of surrounding edema is also increased in the interval. Findings may be related to progression of disease however postradiation changes cannot be excluded. The edema extends into the right basal ganglia and parietal lobe. No new area of abnormal contrast enhancement identified. [Per a neurosurgery read and evaluation that day, the intensity of the enhancement is somewhat lessened, and the flare appears to be about the same.]    MRI brain with and without contrast (04/27/2017):  Impression: Increased cerebral blood volume in the area of abnormal contrast enhancement most consistent with neovascularity and tumor progression. The size and surrounding edema is stable.    MRI brain with and without contrast (05/30/2017):  Impression:  1) Intense, enhancing lesion right frontal lobe with interval evidence of increasing mass effect and edema with increasing midline shift from right to left and increasing compression of the anterior horn right lateral ventricle. The size of the enhancing lesion is very slightly larger by careful comparison and measurement. The degree of overall edema in the right frontal lobe has increased and now crosses the midline to the left frontal lobe. There is midline shift right to left which has increased somewhat since previous studies.  2) Therefore, the findings in the right frontal lobe are slowly progressive by multiple parameters. A distant lesion is not identified elsewhere.    CT cerebral perfusion with and without contrast (05/30/2017):  Impression:  1) Large mass lesion right frontal lobe exhibiting marked increase in  permeability. There is mass effect and edema diffusely in the right frontal lobe with a high grade defect of cerebral blood flow with sporadic areas of preserved cerebral blood volume.  2) There is marked delay in the mean transit time and time to drain in the right frontal tumor and mass region.  3) No other cerebral insults, ischemic abnormalities or parametric map abnormalities are identified elsewhere, outside of the large right frontal mass.    MRI brain with and without contrast (09/19/2017):  Impression:  1) Continued, interval decrease in right frontal lobe postoperative findings with overall decreased vasogenic edema and hemorrhage from prior. No acute hemorrhage or enhancing soft tissue focus to suggest residual enhancing tumor. No evidence for distant, enhancing lesion.  2) No acute intracranial pathology.    MRI brain with and without contrast (11/14/17)  Impression:  1. Complex septated cystic mass with perimeter enhancement right frontal  region surrounded by gliotic increased FLAIR and T2 signal radiating  into the contralateral left frontal lobe and surrounding the mass in the  right frontal lobe. 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  2. The size of the right frontal cystic lesion, the degree of perimeter  enhancement and the overall degree of abnormal FLAIR and T2 signal in  the frontal lobes is stable without change or progression when compared  to previous studies of 09/19/2017.  3. Further, new or enhancing lesion elsewhere is not currently  identified. Therefore, the findings in the brain, more specifically in  the frontal lobes, are stable when compared to the most recent studies.    IMPRESSION AND PLAN  Ms. Alvarez is a 43 y.o., white female with:  1. Glioma: Initially diagnosed in 2007, with recurrences in 2014, late 2016, and, most recently, summer 2017. Now status post resection x 3 (in 2007, 2014 and 06/21/2017) and localized radiation x 2 (adjuvantly in 2007;  and, most recently, a Cyberknife boost delivered in January 2017). Since a third resection was felt to not be in her best interest (at least at the time, in early 2017) and she completed localized XRT, we agreed with neurosurgery's recommendation to start chemotherapy. She began Temodar (patient dose: 320 mg daily on days 1-5 of a 28 day cycle) by late January 2017 and completed five (5), qmonthly cycles. She tolerated this regimen overall well, with some mild, and manageable nausea and increasing fatigue her only noticeable side effects. With this treatment, her CBCs remained unremarkable with no developing cytopenias. Unfortunately, despite this therapy, she developed reworsening headaches; and a repeat MRI in late May 2017 was consistent with reprogressive disease. As a result, she underwent a repeat craniotomy on 06/21/17. She tolerated this debulking surgery well, and she was subsequently started on second-line, palliative therapy with [id number] Avastin. She began this treatment on 08/16/2017, has received a total of eleven (11) cycles to date (due to her needing to undergo placement of an epidural spinal cord stimulator to treat her worsening, chronic back pain, a delay of about six weeks was required in between her eighth and ninth cycles). The most recent repeat MRI of the brain (performed on 11/14/2017 and summarized above) again showed no evidence of disease progression. We will proceed with her current treatment regimen as planned (twelfth cycle of bevacizumab today). She will follow up with neurosurgery with a repeat MRI again on 04/03/2018, as previously planned. We will see her back in our clinic in two weeks (the day of the thirteenth cycle of bevacizumab), shortly after this (the neurosurgery) appointment.  2. Chronic back pain: As discussed above, her bevacizumab had to be held for about six weeks in late 2017/early 2018 to allow placement of an epidural spinal cord stimulator. This was successfully  done on 01/08/2018, to good palliative effect. Continue to follow up with neurosurgery, as previously planned.  3. Nausea: Continue prn Zofran, compazine and dramamine. Continue to monitor.  4. Sinusitis: Rx for Augmentin 875 mg BID x 10 days provided today. Continue to monitor.  The patient was in agreement with these plans.    [facility] Quality Measures:  a) The patient does not use (and has never used) tobacco products.  b) The patient's BMI is above normal parameters. Plan includes a referral to primary care.  c) The current outpatient and discharge medications have been reconciled for the patient.    It is a pleasure to participate in Ms. Devlin's care. Please do not hesitate to call with any questions or concerns that you may have.    A total of 30 minutes were spent coordinating this patient’s care in clinic today; more than 50% of this time was spent face-to-face with the patient, reviewing her interim medical history and counseling on the current treatment and followup plan. All questions were answered to her satisfaction.    FOLLOW UP  Rx for Augmentin provided today. Proceed with palliative, [id number] bevacizumab (Avastin) as planned (12th cycle today). With neurosurgery on 04/03/2018 with a repeat MRI of the brain, as previously planned. Return to our clinic in 2 weeks (on the day of the 13th cycle of Avastin), shortly after this (the neurosurgery) appointment.      This document was electronically signed by JEFFY Pinto MD March 22, 2018 10:50 AM      CC: MD Cirilo Becerra MD Marta Hayne, MD Beverly Paige Neace, PA

## 2018-04-02 PROBLEM — Z45.42 ENCOUNTER FOR ADJUSTMENT AND MANAGEMENT OF NEUROPACEMAKER (BRAIN) (PERIPHERAL NERVE) (SPINAL CORD): Status: ACTIVE | Noted: 2018-01-01

## 2018-04-02 NOTE — PROGRESS NOTES
"Chief Complaint: \"I am doing wonderful with the stimulator. You have no idea.\"    Brief History: Mrs. Marcelina Alvarez is a 43 y.o. female, who underwent implantation of a spinal cord stimulator device with Dr. Cirilo Lemons on 01/08/2017 with [employer] Penta paddle lead (Full Body MRI compatible) with the top electrodes projecting at the level of the superior endplate of the T8 vertebral level. IPG:[employer] Proclaim 7 IPG Non-Rechargeable (Full Body MRI compatible). The device was implanted primarily for treatment of lower back and bilateral lower extremity pain.   Patient returns to the clinic for evaluation of her chronic pain and possible spinal cord stimulator reprogramming. Patient has remained afebrile and surgical wounds are well healed and without erythema, drainage, or fluid accumulation. Patient was last seen on 02/13/2018. She did not require SCS reprogramming due to excellent pain relief from her SCS device. Patient reports that she had another outbreak of shingles and that the pain was well controlled with her SCS device.   Ms. Marcelina Alvarez reports % relief along with remarkable functional improvement with the use of her stimulator device. Patient reports significant relief of her previously severe neurogenic claudication.   Pain level is rated as 1/10 with the stimulator \"turned on.”  Patient level ranges from 0/10 to 2/10 with the use of the spinal cord stimulator device.    Patient denies pain, numbness and weakness in the lower extremities.  Patient denies  any new bladder or bowel problems.     Pain History Prior to SCS Implant:  Referring physician: Dr. David Gregorio  Reason for referral: Consultation for intractable chronic lower back pain secondary to postherpetic neuralgia.   Pain history: Patient reports a 9-year history of pain, which began after multiple (greater than 70) cases of shingles. Pain has progressed in intensity over the past 4 months. Her last outbreak was 6 weeks ago. " Lesions are centered around the spine without spread into her legs.  Pain description: constant pain with intermittent exacerbation, described as aching and burning sensation.   Radiation of pain: The pain radiates into the posterior aspect of the gluteal, lateral aspect of the hips, lateral aspect of the thighs, lateral aspect of the legs, lateral aspect of the ankles and plantar and dorsal aspect of the feet.  Pain intensity today: 6/10  Average pain intensity last week: 9/10  Pain intensity ranges from: 5/10 to 10/10  Aggravating factors: Pain increases with heat.  Alleviating factors: Pain decreases with nothing.   Associated symptoms:   Patient reports pain, numbness and sometimes weakness in the bilateral lower extremities.      Review of previous therapies and additional medical records:  Marcelina Alvarez has already failed the following measures, including:   Conservative measures: oral analgesics (extensive trials with oral analgesics including but not limited to gabapentin, Lyrica, opioids, amitriptyline), physical therapy, heat, changing beds, steroid shots every 3 months that helps only for a few days.   Interventional measures: SCS trial and SCS implant, as referenced previously  Surgical measures: No history of previous lumbar spine surgery  Marcelina Alvarez underwent neurosurgical  consultation with Dr. David Gregorio and was found not to be a surgical candidate.  Marcelina Alvarez presents with significant comorbidities including anxiety and depression, engaged in treatment, morbid obesity, hypertension, non-insulin dependent diabetes, PHN, GERD, headaches, astrocytoma, glioma, osteoarthritis, engaged in treatment.  In terms of current analgesics, Marcelina Alvarez takes:  gabapentin, ibuprofen   I have reviewed Felipe Report #[id number] consistent to medication reconciliation.  Patient is very satisfied with the trial and would like to move forward with implantation of a spinal cord stimulator device.      Diagnostic Studies:    MRI LUMBAR SPINE WO CONTRAST- 11/24/2017. Mild increased lumbar lordosis. Otherwise, vertebral marrow signal alteration, fracture, subluxation or intramedullary lesion is not identified. There is no evidence of dominant dural sac defect, lateralizing impingement or acute focal abnormality.  MRI THORACIC SPINE WO CONTRAST- 11/24/2017. Mild focal degenerative disc and arthropathic disease noted at T8-T9 producing mild dural sac effacement without dominant defect or lateralization. Slight increased thoracolumbar kyphosis. Otherwise, negative MR datasets of the thoracic spine thoracic cord and the epidural space.   MRI BRAIN W WO CONTRAST- 11/14/2017. COMPARISON: Comparison is made to previous studies of 2 months ago, 09/19/2017.  FINDINGS: There is a complex septated cystic lesion in the right frontal area anterior to the rightward aspect of the genu of the corpus callosum and anterior to the anterior horn of the right lateral ventricle. This cystic mass is surrounded by reactive gliosis with increased T2 and FLAIR signal extending in both the right frontal and left frontal lobes across the midline. 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 On the enhanced datasets, there is perimeter enhancement of this cystic mass, however, the configuration size and degree of enhancement is stable. There is dural enhancement of the right frontal reflection, also stable. Enhancing lesion elsewhere is not currently identified. No other acute focal abnormalities are identified within the brain. There is no extracerebral collection or midline shift. The FLAIR datasets are negative for advanced white matter disease. The flow-voids are within normal limits. The patient has ballooning of the sella turcica with near empty sella with marked pituitary atrophy, a finding that is stable from previous exams.  There  "is no evidence of acute restricted diffusion.  IMPRESSION: Complex septated cystic mass with perimeter enhancement right frontal region surrounded by gliotic increased FLAIR and T2 signal radiating into the contralateral left frontal lobe and surrounding the mass in the right frontal lobe. 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 The size of the right frontal cystic lesion, the degree of perimeter enhancement and the overall degree of abnormal FLAIR and T2 signal in the frontal lobes is stable without change or progression when compared to previous studies of 09/19/2017. Further, new or enhancing lesion elsewhere is not currently identified. Therefore, the findings in the brain, more specifically in the frontal lobes, are stable when compared to the most recent studies.      The following portions of the patient's history were reviewed and updated as appropriate: problem list, past medical history, past surgery history, social history, family history, medications, and allergies    Review of Systems   All other systems reviewed and are negative.    /80 (BP Location: Right arm)   Pulse 83   Temp 98.3 °F (36.8 °C) (Temporal Artery )   Resp 18   Ht 157.5 cm (62\")   Wt 113 kg (248 lb 6.4 oz)   SpO2 99%   BMI 45.43 kg/m²       Physical Exam   Neurologic Exam  Constitutional: Patient is oriented to person, place, and time. Vital signs are normal. Patient appears well-developed and well-nourished.   HENT: Head: Normocephalic and atraumatic. Eyes: Conjunctivae and lids are normal. Pupils: Equal, round, reactive to light.   Neck: Trachea normal. Neck supple. No JVD present.   Pulmonary Respiratory effort: No increased work of breathing or signs of respiratory distress. Auscultation of lungs: Clear to auscultation.   Cardiovascular Auscultation of heart: Normal rate and rhythm, normal S1 and S2, no murmurs.   Abdomen: The abdomen was soft and nontender. Bowel sounds were normal. "   Musculoskeletal   Gait and station: Gait evaluation demonstrated a normal gait   Lumbar spine: The range of motion of the lumbar spine is full and without pain. Extension, flexion, lateral flexion, rotation of the lumbar spine did not increase or reproduce pain. Lumbar facet joint loading maneuvers are negative.   Bashir and Gaenslen's tests are negative   Piriformis maneuvers are negative   Palpation of the bilateral ischial tuberosities, unrevealing   Palpation of the bilateral greater trochanter, unrevealing   Examination of the Iliotibial band: unrevealing   Hip joints: The range of motion of the hip joints is full and without pain   Neurological: Patient is alert and oriented to person, place, and time. Speech: speech is normal. Cortical function: Normal mental status.   Cranial nerves: Cranial nerves 2-12 intact.   Reflex Scores:  Right patellar: 2+  Left patellar: 2+  Right achilles: 1+  Left achilles: 1+  Motor strength: 5/5  Motor Tone: normal tone.   Involuntary movements: none.   Superficial/Primitive Reflexes: primitive reflexes were absent.   Right Smith: absent  Left Smith: absent  Right ankle clonus: absent  Left ankle clonus: absent   Spurling sign is negative. Lhermitte sign is negative. Negative long tract signs. Straight leg raising test is negative. Femoral stretch sign is negative.   Sensation: No sensory loss. Sensory exam: intact to light touch, intact pain and temperature sensation, intact vibration sensation and normal proprioception. There is some hyperalgesia and hyperesthesia in the lumbar region  Coordination: Normal finger to nose and heel to shin. Normal balance and negative. Romberg's sign negative.   Skin and subcutaneous tissue: Skin is warm and intact. No rash noted. No cyanosis. Both surgical wounds look unremarkable  Psychiatric: Judgment and insight: Normal. Orientation to person, place and time: Normal. Recent and remote memory: Intact. Mood and affect: Normal.      PROCEDURE: Analysis of the spinal cord stimulator device without spinal cord stimulator reprogramming   Patient used the [employer] spinal cord stimulator device for 1176 hours since last reprogramming, and 2040 lifetime hours (average 24 hours per day). Analysis of impedence reveals normal impedence for all contacts.     Program ONE   Electrode polarities: 4+, 6-, 12+, 14-  Amplitude: 0.4-1.5 mA     Pulse width: 1000 mcs  Rate: 40 Hz  IntraBurst rate: 500 Hz  Micro-dosin:90    A copy of the telemetry report will be scanned in the patient's chart.    ASSESSMENT:   1. Postherpetic neuralgia    2. Type 2 diabetes mellitus with complication, without long-term current use of insulin    3. Astrocytoma    4. Glioma    5. Anxiety and depression    6. Morbid obesity    7. Chronic bilateral low back pain with bilateral sciatica    8. Encounter for adjustment and management of neuropacemaker (brain) (peripheral nerve) (spinal cord)      PLAN:  Patient's chronic pain condition is significantly improved since implantation of her SCS device. Therefore, I have proposed the following plan:  1. Follow up in about 12 weeks   2. Long-term rehabilitation efforts:  A. The patient does not have a history of falls. I did complete a risk assessment for falls.   B. Start a comprehensive physical therapy program for reconditioning, therapeutic exercise, core strengthening, gait and balance training, neurodynamics and desensitization techniques   B. Start an exercise program such as water therapy  C. Referral to [facility]  D. Referral to endocrinology, if possible in [place], in consultation for diabetes and obesity  3. The patient has been instructed to contact my office with any questions or difficulties. The patient understands the plan and agrees to proceed accordingly.      Patient Care Team:  TAYLOR Heard as PCP - General (Physician Assistant)  JEFFY Pinto MD as PCP - Claims  Attributed  David Gregorio MD as Consulting Physician (Neurosurgery)  Lisseth Mckeon MD as Consulting Physician (Radiation Oncology)  JEFFY Pinto MD as Consulting Physician (Hematology and Oncology)  Burton Heaton MD as Consulting Physician (Pain Medicine)  Cirilo Lemons MD as Consulting Physician (Neurosurgery)  Bessie Mathew PA-C as Physician Assistant (Physician Assistant)     New Medications Ordered This Visit   Medications   • Dulaglutide (TRULICITY) 0.75 MG/0.5ML solution pen-injector     Sig: Inject  under the skin.         Future Appointments  Date Time Provider Department Center   4/3/2018 1:45 PM [facility] 3T  [facility]   4/3/2018 4:40 PM David Gregorio MD [facility] None   4/5/2018 10:30 AM  [facility] OP INFUS CHAIR 11  [facility]   4/5/2018 10:30 AM [facility]   4/5/2018 10:45 AM JEFFY Pinto MD [facility] ONC [facility] COR         Burton Heaton MD    EMR Dragon/Transcription disclaimer:  Much of this encounter note is an electronic transcription of spoken language to printed text. Electronic transcription of spoken language may permit erroneous, or at times, nonsensical words or phrases to be inadvertently transcribed. Although I have reviewed the note for such errors, some may still exist.

## 2018-04-05 NOTE — PROGRESS NOTES
Name:  Marcelina Alvarez  :  1975  Date:  2018     REFERRING PHYSICIAN  David Gregorio MD    PRIMARY CARE PROVIDER  TAYLOR Heard    REASON FOR FOLLOWUP  1. Glioma      CHIEF COMPLAINT  Manageable headaches, fatigue and nausea for a few days after each cycle of Avastin, currently still resolved. Much improved chronic back pain, status post stimulator placement in early 2018. Ongoing sinus congestion/allergies.    Dear Dr. Gregorio,    HISTORY OF PRESENT ILLNESS:   I saw Ms. Alvarez in follow up today in our medical oncology clinic. 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 She was initially diagnosed in , and you performed a successful resection. She subsequently underwent localized XRT (but no chemotherapy). She did very well until , when she developed a localized recurrence; and, again, you performed a successful resection (this time without adjuvant XRT). She was again doing well until late , when she started to develop some mild forgetfulness and headaches; and a repeat MRI showed evidence of recurrent disease again. This time, it was felt that repeat surgery was not in her best interest (at least at that time). She underwent Cyberknife treatment in early 2017 and tolerated it well. She was subsequently referred to our clinic to consider chemotherapy (Temodar). At the time of her initial appointment with us (on 2017), she was agreeable to this treatment (patient dose: 320 mg on days 1-5 of a 28-day cycle). She did overall well on this regimen for several months; however, unfortunately, repeat imaging in May 2017 showed evidence of disease reprogression. She underwent a repeat craniotomy for debulking in early summer 2017 and has been doing overall well since then on second-line palliative therapy with [id number] Avastin.    INTERIM HISTORY:  Ms. Alvarez returns to  "clinic today for follow up by herself. She began palliative [id number] Avastin on 08/16/2017, has received a total of twelve (12) cycles to date and is overall still tolerating them well. She most recently underwent a repeat neurosurgery and MRI evaluation in mid-November 2017, which showed no evidence of disease progression. The Avastin had to be held for about six weeks in late 2017 in order for her to undergo the planned placement of an epidural spinal cord stimulator (for chronic, worsening low back pain). This was successfully done on 01/08/2018, and she reiterates today that this procedure has helped \"immensely\". Her only complaint today is of persistent sinus congestion that she has been unable to shake. As a recent course of Augmentin did not improve her symptoms, she believes they are related to allergies. She otherwise continues to feel overall well and again has no new or other specific complaints.    Past Medical History:   Diagnosis Date   • Anxiety and depression    • Arthritis     RIGHT FOOT \"DUE TO FOUR STRESS FRACTURES\"    • Brain tumor 2010,2014,2017    SURGERY x3, CHEMO AND RADIATON    • Diabetes mellitus     DX'D TYPE II NIDDM APPROX 6 WEEKS AGO, \"CAUSED BY CHEMO\" CHECKS BS -150 IN AM -275 PM    • Eczema    • Frequent headaches    • GERD (gastroesophageal reflux disease)    • History of shingles     MULTIPLE OUTBREAKS--NO OPEN AREAS AT THIS TIME \"SHOW UP JUST ABOVE BUTTOCKS\"    • Hypertension     CONTROLLED WITH MEDS PER PT    • IBS (irritable bowel syndrome)    • Low back pain    • Nerve damage     from shingles   • PONV (postoperative nausea and vomiting)    • Prophylactic chemotherapy     receives chemotherapy regularly due to brain tumor recurrence    • Sinus disease    • Wears reading eyeglasses        Past Surgical History:   Procedure Laterality Date   • BREAST SURGERY      REDUCTION    • CHOLECYSTECTOMY     • CRANIOTOMY FOR TUMOR Right 6/21/2017    Procedure: CRANIOTOMY FOR " TUMOR STEREOTACTIC WITH STEALTH; planned right frontal lobectomy for astrocytoma progression;  Surgeon: David Gregorio MD;  Location:  [facility];  Service:    • CRANIOTOMY FOR TUMOR  12/05/2014    CRANI FOR GLIOMA (Dr. Gregorio)   • CRANIOTOMY FOR TUMOR  08/31/2007    CRANI & EXCISION OF LOW GRADE GLIOMA & LUMBAR DRAIN (Dr. Gregorio)   • ENDOMETRIAL ABLATION     • PORTACATH PLACEMENT     • SPINAL CORD STIMULATOR IMPLANT N/A 1/8/2018    Procedure: SPINAL CORD STIMULATOR INSERTION PHASE;  Surgeon: Cirilo Lemons MD;  Location:  [facility];  Service:        Social History     Social History   • Marital status:      Spouse name: N/A   • Number of children: N/A   • Years of education: N/A     Occupational History   • Not on file.     Social History Main Topics   • Smoking status: Never Smoker   • Smokeless tobacco: Never Used   • Alcohol use No   • Drug use: No   • Sexual activity: Defer     Other Topics Concern   • Not on file     Social History Narrative   • No narrative on file       Family History   Problem Relation Age of Onset   • Hypertension Father      pulmonary       Allergies   Allergen Reactions   • Morphine And Related Hallucinations   • Diamox [Acetazolamide] Rash       Current Outpatient Prescriptions   Medication Sig Dispense Refill   • ACCU-CHEK JACOB PLUS test strip USE BID  3   • ACCU-CHEK SOFTCLIX LANCETS lancets      • ARIPiprazole (ABILIFY) 5 MG tablet Take 5 mg by mouth Daily.     • bevacizumab (AVASTIN) 100 MG/4ML chemo injection Infuse  into a venous catheter.     • clonazePAM (KlonoPIN) 1 MG tablet Take 1 mg by mouth 2 (Two) Times a Day As Needed.     • cyclobenzaprine (FLEXERIL) 5 MG tablet TK 1 T PO  Q 6 H PRF MUSCLE SPASMS  0   • Dulaglutide (TRULICITY) 0.75 MG/0.5ML solution pen-injector Inject  under the skin.     • gabapentin (NEURONTIN) 800 MG tablet Take 800 mg by mouth 3 (Three) Times a Day.     • HYDROcodone-acetaminophen (NORCO) 7.5-325 MG per tablet Take 1 tablet by mouth Every  6 (Six) Hours As Needed for Moderate Pain  (Pain). 25 tablet 0   • ibuprofen (ADVIL,MOTRIN) 800 MG tablet Take 800 mg by mouth Every 8 (Eight) Hours As Needed for Mild Pain .     • lidocaine-prilocaine (EMLA) 2.5-2.5 % cream Apply 1 hour prior to VAD access (Patient taking differently: 1 application As Needed (before port access for chemo). Apply 1 hour prior to VAD access) 30 g 5   • lisinopril-hydrochlorothiazide (PRINZIDE,ZESTORETIC) 20-12.5 MG per tablet Take 1 tablet by mouth Daily.     • ranitidine (ZANTAC) 150 MG capsule Take 150 mg by mouth 2 (Two) Times a Day.     • sertraline (ZOLOFT) 100 MG tablet Take 150 mg by mouth Daily.     • SITagliptin (JANUVIA) 100 MG tablet Take 100 mg by mouth Daily.     • valACYclovir (VALTREX) 500 MG tablet Take 1,000 mg by mouth As Needed (shingles).     • zolpidem (AMBIEN) 5 MG tablet Take 5 mg by mouth Every Night.       No current facility-administered medications for this visit.      Facility-Administered Medications Ordered in Other Visits   Medication Dose Route Frequency Provider Last Rate Last Dose   • mupirocin (BACTROBAN) 2 % nasal ointment   Nasal BID Ирина Interiano PA-C           REVIEW OF SYSTEMS  CONSTITUTIONAL:  No fever, chills or night sweats. Intermittent fatigue.  EYES:  No blurry vision, diplopia or other vision changes.  ENT:  No hearing loss, nosebleeds or sore throat. Ongoing sinus congestion/allergies, as per the HPI above.  CARDIOVASCULAR:  No palpitations, arrhythmia, syncopal episodes or edema.  PULMONARY:  No hemoptysis, wheezing, chronic cough or shortness of breath.  GASTROINTESTINAL:  No constipation or diarrhea.  No abdominal pain. Occasional nausea (though not as much as when she was on Temodar), controlled with prn antiemetics.  GENITOURINARY:  No hematuria, kidney stones or frequent urination.  MUSCULOSKELETAL:  Chronic back pains, improved status post stimulator implantation.  INTEGUMENTARY: No rashes or pruritus.  ENDOCRINE:  No excessive  "thirst or hot flashes.  HEMATOLOGIC:  No history of free bleeding, spontaneous bleeding or clotting.  IMMUNOLOGIC:  No allergies or frequent infections.  NEUROLOGIC: Chronic nerve pain in the lower back, now much improved status post epidural spinal cord stimulator in early January 2018. Intermittent headaches, stable.  PSYCHIATRIC:  No anxiety or depression.    PHYSICAL EXAMINATION    /82   Pulse 89   Temp 97.6 °F (36.4 °C) (Oral)   Resp 18   Wt 110 kg (243 lb 9.6 oz)   SpO2 98%   BMI 44.56 kg/m²     GENERAL:  A well-developed, well-nourished, obese, white female in no acute distress.  HEENT:  Pupils equally round and reactive to light.  Extraocular muscles intact.  CARDIOVASCULAR:  Regular rate and rhythm.  No murmurs, gallops or rubs.  LUNGS:  Clear to auscultation bilaterally.  ABDOMEN:  Soft, nontender, nondistended with positive bowel sounds.  EXTREMITIES:  No clubbing, cyanosis or edema bilaterally.  SKIN:  No rashes or petechiae.  NEURO:  Cranial nerves grossly intact.  No focal deficits.  PSYCH:  Alert and oriented x3.    LABORATORY    Lab Results   Component Value Date    WBC 8.48 04/05/2018    HGB 15.9 04/05/2018    HCT 46.5 04/05/2018    MCV 80.4 04/05/2018     04/05/2018    NEUTROABS 5.18 04/05/2018       Lab Results   Component Value Date     03/22/2018    K 4.3 03/22/2018     03/22/2018    CO2 21.8 (L) 03/22/2018    BUN 14 03/22/2018    CREATININE 0.93 03/22/2018    GLUCOSE 243 (H) 03/22/2018    CALCIUM 9.7 03/22/2018    AST 37 (H) 03/22/2018    ALT 55 (H) 03/22/2018    ALKPHOS 62 03/22/2018    BILITOT 0.4 03/22/2018    PROTEINTOT 7.5 03/22/2018    ALBUMIN 4.30 03/22/2018     IMAGING  MRI brain with and without contrast (06/13/2016):  Impression: There are bilateral frontal changes related to prior surgery, right greater than left. There is some associated gliosis and minimal contrast enhancement in the superior leftward aspect of the surgical \"bed\". Most importantly, " there has been no change since 02/22/2016.    MRI cyberknife brain with contrast (12/28/2016):  Impression: Areas of increased blood flow in the right frontal lobe in areas of abnormal contrast enhancement and therefore the abnormality seen on prior MRI is highly suspicious for tumor.    MRI brain with and without contrast (02/28/2017):  Impression: Slight interval increase in the size of the area of abnormal contrast enhancement diffusely throughout the right frontal region. The amount of surrounding edema is also increased in the interval. Findings may be related to progression of disease however postradiation changes cannot be excluded. The edema extends into the right basal ganglia and parietal lobe. No new area of abnormal contrast enhancement identified. [Per a neurosurgery read and evaluation that day, the intensity of the enhancement is somewhat lessened, and the flare appears to be about the same.]    MRI brain with and without contrast (04/27/2017):  Impression: Increased cerebral blood volume in the area of abnormal contrast enhancement most consistent with neovascularity and tumor progression. The size and surrounding edema is stable.    MRI brain with and without contrast (05/30/2017):  Impression:  1) Intense, enhancing lesion right frontal lobe with interval evidence of increasing mass effect and edema with increasing midline shift from right to left and increasing compression of the anterior horn right lateral ventricle. The size of the enhancing lesion is very slightly larger by careful comparison and measurement. The degree of overall edema in the right frontal lobe has increased and now crosses the midline to the left frontal lobe. There is midline shift right to left which has increased somewhat since previous studies.  2) Therefore, the findings in the right frontal lobe are slowly progressive by multiple parameters. A distant lesion is not identified elsewhere.    CT cerebral perfusion with and  without contrast (05/30/2017):  Impression:  1) Large mass lesion right frontal lobe exhibiting marked increase in permeability. There is mass effect and edema diffusely in the right frontal lobe with a high grade defect of cerebral blood flow with sporadic areas of preserved cerebral blood volume.  2) There is marked delay in the mean transit time and time to drain in the right frontal tumor and mass region.  3) No other cerebral insults, ischemic abnormalities or parametric map abnormalities are identified elsewhere, outside of the large right frontal mass.    MRI brain with and without contrast (09/19/2017):  Impression:  1) Continued, interval decrease in right frontal lobe postoperative findings with overall decreased vasogenic edema and hemorrhage from prior. No acute hemorrhage or enhancing soft tissue focus to suggest residual enhancing tumor. No evidence for distant, enhancing lesion.  2) No acute intracranial pathology.    MRI brain with and without contrast (11/14/17)  Impression:  1. Complex septated cystic mass with perimeter enhancement right frontal  region surrounded by gliotic increased FLAIR and T2 signal radiating  into the contralateral left frontal lobe and surrounding the mass in the  right frontal lobe. 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  2. The size of the right frontal cystic lesion, the degree of perimeter  enhancement and the overall degree of abnormal FLAIR and T2 signal in  the frontal lobes is stable without change or progression when compared  to previous studies of 09/19/2017.  3. Further, new or enhancing lesion elsewhere is not currently  identified. Therefore, the findings in the brain, more specifically in  the frontal lobes, are stable when compared to the most recent studies.    IMPRESSION AND PLAN  Ms. Alvarez is a 43 y.o., white female with:  1. Glioma: Initially diagnosed in 2007, with recurrences in 2014, late 2016, and, most recently, summer  2017. Now status post resection x 3 (in 2007, 2014 and 06/21/2017) and localized radiation x 2 (adjuvantly in 2007; and, most recently, a Cyberknife boost delivered in January 2017). Since a third resection was felt to not be in her best interest (at least at the time, in early 2017) and she completed localized XRT, we agreed with neurosurgery's recommendation to start chemotherapy. She began Temodar (patient dose: 320 mg daily on days 1-5 of a 28 day cycle) by late January 2017 and completed five (5), qmonthly cycles. She tolerated this regimen overall well, with some mild, and manageable nausea and increasing fatigue her only noticeable side effects. With this treatment, her CBCs remained unremarkable with no developing cytopenias. Unfortunately, despite this therapy, she developed reworsening headaches; and a repeat MRI in late May 2017 was consistent with reprogressive disease. As a result, she underwent a repeat craniotomy on 06/21/17. She tolerated this debulking surgery well, and she was subsequently started on second-line, palliative therapy with [id number] Avastin. She began this treatment on 08/16/2017, has received a total of twelve (12) cycles to date (due to her needing to undergo placement of an epidural spinal cord stimulator to treat her worsening, chronic back pain, a delay of about six weeks was required in between her eighth and ninth cycles). The most recent repeat MRI of the brain (performed on 11/14/2017 and summarized above) again showed no evidence of disease progression. We will proceed with her current treatment regimen as planned (thirteenth cycle of bevacizumab today). She will follow up with neurosurgery with a repeat MRI again on 04/23/2018. We will see her back in our clinic in one month (on the day of the fifteenth cycle of bevacizumab), shortly after this (the neurosurgery) appointment.  2. Chronic back pain: As discussed above, her bevacizumab had to be held for about six weeks in  late 2017/early 2018 to allow placement of an epidural spinal cord stimulator. This was successfully done on 01/08/2018, to good palliative effect. Continue to follow up with neurosurgery, as previously planned.  3. Nausea: Continue prn Zofran, compazine and dramamine. Continue to monitor.  4. Chronic sinusitis/allergies: A recent course of Augmentin 875 mg BID did not improve her symptoms. She was advised to start and routinely use an oral histamine and intranasal steroids. Continue to monitor.  The patient was in agreement with these plans.    ACO Quality Measures:  a) The patient does not use (and has never used) tobacco products.  b) The patient's BMI is above normal parameters. Plan includes a referral to primary care.  c) The current outpatient and discharge medications have been reconciled for the patient.    It is a pleasure to participate in Ms. Devlin's care. Please do not hesitate to call with any questions or concerns that you may have.    A total of 30 minutes were spent coordinating this patient’s care in clinic today; more than 50% of this time was spent face-to-face with the patient, reviewing her interim medical history and counseling on the current treatment and followup plan. All questions were answered to her satisfaction.    FOLLOW UP  Proceed with palliative, [id number] bevacizumab (Avastin) as planned (13th cycle today). With neurosurgery on 04/23/2018 with a repeat MRI of the brain. Return to our clinic in 1 month (on the day of the 15th cycle of Avastin), shortly after this (the neurosurgery) appointment.      This document was electronically signed by JEFFY Pinto MD April 5, 2018 10:45 AM      CC: MD Cirilo Becerra MD Marta Hayne, MD Beverly Paige Neace, PA

## 2018-04-23 NOTE — PROGRESS NOTES
"Marcelina Alvarez  1975  [id number]                       CURRENT WORKING DIAGNOSIS:  [ Right frontal glioma]         MEDICAL HISTORY SINCE LAST ENCOUNTER:  [This 43-year-old female is doing well from a neurosurgical perspective.  She had a right frontal glioma resected in 2007; recurrence in 2017.  She is now approaching 1 year.  She is on Avastin.  She reports today for MRI. ]           Past Medical History:   Diagnosis Date   • Anxiety and depression    • Arthritis     RIGHT FOOT \"DUE TO FOUR STRESS FRACTURES\"    • Brain tumor 2010,2014,2017    SURGERY x3, CHEMO AND RADIATON    • Diabetes mellitus     DX'D TYPE II NIDDM APPROX 6 WEEKS AGO, \"CAUSED BY CHEMO\" CHECKS BS -150 IN AM -275 PM    • Eczema    • Frequent headaches    • GERD (gastroesophageal reflux disease)    • History of shingles     MULTIPLE OUTBREAKS--NO OPEN AREAS AT THIS TIME \"SHOW UP JUST ABOVE BUTTOCKS\"    • Hypertension     CONTROLLED WITH MEDS PER PT    • IBS (irritable bowel syndrome)    • Low back pain    • Nerve damage     from shingles   • PONV (postoperative nausea and vomiting)    • Prophylactic chemotherapy     receives chemotherapy regularly due to brain tumor recurrence    • Sinus disease    • Wears reading eyeglasses               Past Surgical History:   Procedure Laterality Date   • BREAST SURGERY      REDUCTION    • CHOLECYSTECTOMY     • CRANIOTOMY FOR TUMOR Right 6/21/2017    Procedure: CRANIOTOMY FOR TUMOR STEREOTACTIC WITH STEALTH; 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 Gregorio)   • CRANIOTOMY FOR TUMOR  08/31/2007    CRANI & EXCISION OF LOW GRADE GLIOMA & LUMBAR DRAIN (Dr. Gregorio)   • ENDOMETRIAL ABLATION     • PORTACATH PLACEMENT     • SPINAL CORD STIMULATOR IMPLANT N/A 1/8/2018    Procedure: SPINAL CORD STIMULATOR INSERTION PHASE;  Surgeon: Cirilo Lemons MD;  " Location: [facility] OR;  Service:               Family History   Problem Relation Age of Onset   • Hypertension Father      pulmonary              Social History     Social History   • Marital status:      Spouse name: N/A   • Number of children: N/A   • Years of education: N/A     Occupational History   • Not on file.     Social History Main Topics   • Smoking status: Never Smoker   • Smokeless tobacco: Never Used   • Alcohol use No   • Drug use: No   • Sexual activity: Defer     Other Topics Concern   • Not on file     Social History Narrative   • No narrative on file              Allergies   Allergen Reactions   • Morphine And Related Hallucinations   • Diamox [Acetazolamide] Rash              Current Outpatient Prescriptions:   •  ACCU-CHEK JACOB PLUS test strip, USE BID, Disp: , Rfl: 3  •  ACCU-CHEK SOFTCLIX LANCETS lancets, , Disp: , Rfl:   •  ARIPiprazole (ABILIFY) 5 MG tablet, Take 5 mg by mouth Daily., Disp: , Rfl:   •  bevacizumab (AVASTIN) 100 MG/4ML chemo injection, Infuse  into a venous catheter., Disp: , Rfl:   •  clonazePAM (KlonoPIN) 1 MG tablet, Take 1 mg by mouth 2 (Two) Times a Day As Needed., Disp: , Rfl:   •  cyclobenzaprine (FLEXERIL) 5 MG tablet, TK 1 T PO  Q 6 H PRF MUSCLE SPASMS, Disp: , Rfl: 0  •  Dulaglutide (TRULICITY) 0.75 MG/0.5ML solution pen-injector, Inject  under the skin., Disp: , Rfl:   •  gabapentin (NEURONTIN) 800 MG tablet, Take 800 mg by mouth 3 (Three) Times a Day., Disp: , Rfl:   •  HYDROcodone-acetaminophen (NORCO) 7.5-325 MG per tablet, Take 1 tablet by mouth Every 6 (Six) Hours As Needed for Moderate Pain  (Pain)., Disp: 25 tablet, Rfl: 0  •  ibuprofen (ADVIL,MOTRIN) 800 MG tablet, Take 800 mg by mouth Every 8 (Eight) Hours As Needed for Mild Pain ., Disp: , Rfl:   •  lidocaine-prilocaine (EMLA) 2.5-2.5 % cream, Apply 1 hour prior to VAD access (Patient taking differently: 1 application As Needed (before port access for chemo). Apply 1 hour prior to VAD access), Disp:  30 g, Rfl: 5  •  lisinopril-hydrochlorothiazide (PRINZIDE,ZESTORETIC) 20-12.5 MG per tablet, Take 1 tablet by mouth Daily., Disp: , Rfl:   •  ranitidine (ZANTAC) 150 MG capsule, Take 150 mg by mouth 2 (Two) Times a Day., Disp: , Rfl:   •  sertraline (ZOLOFT) 100 MG tablet, Take 150 mg by mouth Daily., Disp: , Rfl:   •  SITagliptin (JANUVIA) 100 MG tablet, Take 100 mg by mouth Daily., Disp: , Rfl:   •  valACYclovir (VALTREX) 500 MG tablet, Take 1,000 mg by mouth As Needed (shingles)., Disp: , Rfl:   •  zolpidem (AMBIEN) 5 MG tablet, Take 5 mg by mouth Every Night., Disp: , Rfl:   No current facility-administered medications for this visit.     Facility-Administered Medications Ordered in Other Visits:   •  mupirocin (BACTROBAN) 2 % nasal ointment, , Nasal, BID, Ирина Interiano PA-C         Review of Systems   Constitutional: Negative for activity change, appetite change, chills, diaphoresis, fatigue, fever and unexpected weight change.   HENT: Negative for congestion, dental problem, drooling, ear discharge, ear pain, facial swelling, hearing loss, mouth sores, nosebleeds, postnasal drip, rhinorrhea, sinus pressure, sneezing, sore throat, tinnitus, trouble swallowing and voice change.    Eyes: Negative for photophobia, pain, discharge, redness, itching and visual disturbance.   Respiratory: Positive for shortness of breath. Negative for apnea, cough, choking, chest tightness, wheezing and stridor.    Cardiovascular: Negative for chest pain, palpitations and leg swelling.   Gastrointestinal: Negative for abdominal distention, abdominal pain, anal bleeding, blood in stool, constipation, diarrhea, nausea, rectal pain and vomiting.   Endocrine: Negative for cold intolerance, heat intolerance, polydipsia, polyphagia and polyuria.   Genitourinary: Negative for decreased urine volume, difficulty urinating, dysuria, enuresis, flank pain, frequency, genital sores, hematuria and urgency.   Musculoskeletal: Negative for  "arthralgias, back pain, gait problem, joint swelling, myalgias, neck pain and neck stiffness.   Skin: Negative for color change, pallor, rash and wound.   Allergic/Immunologic: Negative for environmental allergies, food allergies and immunocompromised state.   Neurological: Negative for dizziness, tremors, seizures, syncope, facial asymmetry, speech difficulty, weakness, light-headedness, numbness and headaches.   Hematological: Negative for adenopathy. Does not bruise/bleed easily.   Psychiatric/Behavioral: Negative for agitation, behavioral problems, confusion, decreased concentration, dysphoric mood, hallucinations, self-injury, sleep disturbance and suicidal ideas. The patient is not nervous/anxious and is not hyperactive.                Vitals:    04/23/18 1135   Height: 157.5 cm (62\")               EXAMINATION: No weakness, sensory loss or reflex asymmetry.  No papilledema.            MEDICAL DECISION MAKING: The MRI is stable showing no evidence of progression at this time.           ASSESSMENT/DISPOSITION: Thus far she seemed to be progressing well and tolerating chemotherapy without sequelae.  She will return to see me in 4 months for continued follow-up.              I APPRECIATE THE OPPORTUNITY OF THIS REFERRAL. PLEASE CALL IF ANY       QUESTIONS [phone]    Scribed for David Gregorio MD by Liza Hanley CMA. 4/23/2018  12:22 PM      "

## 2018-05-03 NOTE — PROGRESS NOTES
Name:  Marcelina Alvarez  :  1975  Date:  5/3/2018     REFERRING PHYSICIAN  David Gregorio MD    PRIMARY CARE PROVIDER  TAYLOR Heard    REASON FOR FOLLOWUP  1. Glioma      CHIEF COMPLAINT  Much improved chronic back pain, status post stimulator placement in early 2018. Ongoing sinus congestion/allergies. Diffuse joint/bone aches.    Dear Dr. Gregorio,    HISTORY OF PRESENT ILLNESS:   I saw Ms. Alvarez in follow up today in our medical oncology clinic. 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 She was initially diagnosed in , and you performed a successful resection. She subsequently underwent localized XRT (but no chemotherapy). She did very well until , when she developed a localized recurrence; and, again, you performed a successful resection (this time without adjuvant XRT). She was again doing well until late , when she started to develop some mild forgetfulness and headaches; and a repeat MRI showed evidence of recurrent disease again. This time, it was felt that repeat surgery was not in her best interest (at least at that time). She underwent Cyberknife treatment in early 2017 and tolerated it well. She was subsequently referred to our clinic to consider chemotherapy (Temodar). At the time of her initial appointment with us (on 2017), she was agreeable to this treatment (patient dose: 320 mg on days 1-5 of a 28-day cycle). She did overall well on this regimen for several months; however, unfortunately, repeat imaging in May 2017 showed evidence of disease reprogression. She underwent a repeat craniotomy for debulking in early summer 2017 and has been doing overall well since then on second-line palliative therapy with [id number] Avastin.    INTERIM HISTORY:  Ms. Alvarez returns to clinic today for follow up by herself. She began palliative [id number] Avastin on  "08/16/2017, has received a total of fourteen (14) cycles to date and is overall still tolerating them well. She most recently underwent a repeat neurosurgery and MRI evaluation in late April 2018, which showed no evidence of disease progression. The Avastin had to be held for about six weeks in late 2017 in order for her to undergo the planned placement of an epidural spinal cord stimulator (for chronic, worsening low back pain). This was successfully done on 01/08/2018, and this procedure helped \"immensely\".    Past Medical History:   Diagnosis Date   • Anxiety and depression    • Arthritis     RIGHT FOOT \"DUE TO FOUR STRESS FRACTURES\"    • Brain tumor 2010,2014,2017    SURGERY x3, CHEMO AND RADIATON    • Diabetes mellitus     DX'D TYPE II NIDDM APPROX 6 WEEKS AGO, \"CAUSED BY CHEMO\" CHECKS BS -150 IN AM -275 PM    • Eczema    • Frequent headaches    • GERD (gastroesophageal reflux disease)    • History of shingles     MULTIPLE OUTBREAKS--NO OPEN AREAS AT THIS TIME \"SHOW UP JUST ABOVE BUTTOCKS\"    • Hypertension     CONTROLLED WITH MEDS PER PT    • IBS (irritable bowel syndrome)    • Low back pain    • Nerve damage     from shingles   • PONV (postoperative nausea and vomiting)    • Prophylactic chemotherapy     receives chemotherapy regularly due to brain tumor recurrence    • Sinus disease    • Wears reading eyeglasses        Past Surgical History:   Procedure Laterality Date   • BREAST SURGERY      REDUCTION    • CHOLECYSTECTOMY     • CRANIOTOMY FOR TUMOR Right 6/21/2017    Procedure: CRANIOTOMY FOR TUMOR STEREOTACTIC WITH STEALTH; planned right frontal lobectomy for astrocytoma progression;  Surgeon: David Gregorio MD;  Location: [facility];  Service:    • CRANIOTOMY FOR TUMOR  12/05/2014    CRANI FOR GLIOMA (Dr. Gregorio)   • CRANIOTOMY FOR TUMOR  08/31/2007    CRANI & EXCISION OF LOW GRADE GLIOMA & LUMBAR DRAIN (Dr. Gregorio)   • ENDOMETRIAL ABLATION     • PORTACATH PLACEMENT     • SPINAL CORD " STIMULATOR IMPLANT N/A 1/8/2018    Procedure: SPINAL CORD STIMULATOR INSERTION PHASE;  Surgeon: Cirilo Lemons MD;  Location: [facility] beds at the [facility];  Service:        Social History     Social History   • Marital status:      Spouse name: N/A   • Number of children: N/A   • Years of education: N/A     Occupational History   • Not on file.     Social History Main Topics   • Smoking status: Never Smoker   • Smokeless tobacco: Never Used   • Alcohol use No   • Drug use: No   • Sexual activity: Defer     Other Topics Concern   • Not on file     Social History Narrative   • No narrative on file       Family History   Problem Relation Age of Onset   • Hypertension Father      pulmonary       Allergies   Allergen Reactions   • Morphine And Related Hallucinations   • Diamox [Acetazolamide] Rash       Current Outpatient Prescriptions   Medication Sig Dispense Refill   • ACCU-CHEK JACOB PLUS test strip USE BID  3   • ACCU-CHEK SOFTCLIX LANCETS lancets      • ARIPiprazole (ABILIFY) 5 MG tablet Take 5 mg by mouth Daily.     • bevacizumab (AVASTIN) 100 MG/4ML chemo injection Infuse  into a venous catheter.     • clonazePAM (KlonoPIN) 1 MG tablet Take 1 mg by mouth 2 (Two) Times a Day As Needed.     • cyclobenzaprine (FLEXERIL) 5 MG tablet TK 1 T PO  Q 6 H PRF MUSCLE SPASMS  0   • Dulaglutide (TRULICITY) 0.75 MG/0.5ML solution pen-injector Inject  under the skin.     • gabapentin (NEURONTIN) 800 MG tablet Take 800 mg by mouth 3 (Three) Times a Day.     • HYDROcodone-acetaminophen (NORCO) 7.5-325 MG per tablet Take 1 tablet by mouth Every 6 (Six) Hours As Needed for Moderate Pain  (Pain). 25 tablet 0   • ibuprofen (ADVIL,MOTRIN) 800 MG tablet Take 800 mg by mouth Every 8 (Eight) Hours As Needed for Mild Pain .     • lidocaine-prilocaine (EMLA) 2.5-2.5 % cream Apply 1 hour prior to VAD access (Patient taking differently: 1 application As Needed (before port access for chemo). Apply 1 hour prior to VAD access) 30 g 5   •  lisinopril-hydrochlorothiazide (PRINZIDE,ZESTORETIC) 20-12.5 MG per tablet Take 1 tablet by mouth Daily.     • ranitidine (ZANTAC) 150 MG capsule Take 150 mg by mouth 2 (Two) Times a Day.     • sertraline (ZOLOFT) 100 MG tablet Take 150 mg by mouth Daily.     • SITagliptin (JANUVIA) 100 MG tablet Take 100 mg by mouth Daily.     • valACYclovir (VALTREX) 500 MG tablet Take 1,000 mg by mouth As Needed (shingles).     • zolpidem (AMBIEN) 5 MG tablet Take 5 mg by mouth Every Night.       No current facility-administered medications for this visit.      Facility-Administered Medications Ordered in Other Visits   Medication Dose Route Frequency Provider Last Rate Last Dose   • mupirocin (BACTROBAN) 2 % nasal ointment   Nasal BID Ирина Interiano PA-C           REVIEW OF SYSTEMS  CONSTITUTIONAL:  No fever, chills or night sweats. Intermittent fatigue.  EYES:  No blurry vision, diplopia or other vision changes.  ENT:  No hearing loss, nosebleeds or sore throat. Sinus congestion/allergies.  CARDIOVASCULAR:  No palpitations, arrhythmia, syncopal episodes or edema.  PULMONARY:  No hemoptysis, wheezing, chronic cough or shortness of breath.  GASTROINTESTINAL:  No constipation or diarrhea.  No abdominal pain. Occasional nausea (though not as much as when she was on Temodar), controlled with prn antiemetics.  GENITOURINARY:  No hematuria, kidney stones or frequent urination.  MUSCULOSKELETAL:  Chronic back pains, improved status post stimulator implantation. Diffuse joint/bone aches.  INTEGUMENTARY: No rashes or pruritus.  ENDOCRINE:  No excessive thirst or hot flashes.  HEMATOLOGIC:  No history of free bleeding, spontaneous bleeding or clotting.  IMMUNOLOGIC:  No allergies or frequent infections.  NEUROLOGIC: Chronic nerve pain in the lower back, now much improved status post epidural spinal cord stimulator in early January 2018. Intermittent headaches, stable.  PSYCHIATRIC:  No anxiety or depression.    PHYSICAL EXAMINATION    BP  "132/91   Pulse 87   Temp 97 °F (36.1 °C) (Oral)   Resp 20   Wt 112 kg (246 lb 6.4 oz)   SpO2 100%   BMI 45.07 kg/m²     GENERAL:  A well-developed, well-nourished, obese, white female in no acute distress.  HEENT:  Pupils equally round and reactive to light.  Extraocular muscles intact.  CARDIOVASCULAR:  Regular rate and rhythm.  No murmurs, gallops or rubs.  LUNGS:  Clear to auscultation bilaterally.  ABDOMEN:  Soft, nontender, nondistended with positive bowel sounds.  EXTREMITIES:  No clubbing, cyanosis or edema bilaterally.  SKIN:  No rashes or petechiae.  NEURO:  Cranial nerves grossly intact.  No focal deficits.  PSYCH:  Alert and oriented x3.    LABORATORY    Lab Results   Component Value Date    WBC 8.76 05/03/2018    HGB 16.0 05/03/2018    HCT 46.8 05/03/2018    MCV 80.7 05/03/2018     05/03/2018    NEUTROABS 5.12 05/03/2018       Lab Results   Component Value Date     05/03/2018    K 4.6 05/03/2018     05/03/2018    CO2 24.2 (L) 05/03/2018    BUN 15 05/03/2018    CREATININE 0.85 05/03/2018    GLUCOSE 201 (H) 05/03/2018    CALCIUM 9.6 05/03/2018    AST 30 04/05/2018    ALT 50 (H) 04/05/2018    ALKPHOS 57 04/05/2018    BILITOT 0.5 04/05/2018    PROTEINTOT 7.1 04/05/2018    ALBUMIN 4.00 04/05/2018     IMAGING  MRI brain with and without contrast (06/13/2016):  Impression: There are bilateral frontal changes related to prior surgery, right greater than left. There is some associated gliosis and minimal contrast enhancement in the superior leftward aspect of the surgical \"bed\". Most importantly, there has been no change since 02/22/2016.    MRI cyberknife brain with contrast (12/28/2016):  Impression: Areas of increased blood flow in the right frontal lobe in areas of abnormal contrast enhancement and therefore the abnormality seen on prior MRI is highly suspicious for tumor.    MRI brain with and without contrast (02/28/2017):  Impression: Slight interval increase in the size of the " area of abnormal contrast enhancement diffusely throughout the right frontal region. The amount of surrounding edema is also increased in the interval. Findings may be related to progression of disease however postradiation changes cannot be excluded. The edema extends into the right basal ganglia and parietal lobe. No new area of abnormal contrast enhancement identified. [Per a neurosurgery read and evaluation that day, the intensity of the enhancement is somewhat lessened, and the flare appears to be about the same.]    MRI brain with and without contrast (04/27/2017):  Impression: Increased cerebral blood volume in the area of abnormal contrast enhancement most consistent with neovascularity and tumor progression. The size and surrounding edema is stable.    MRI brain with and without contrast (05/30/2017):  Impression:  1) Intense, enhancing lesion right frontal lobe with interval evidence of increasing mass effect and edema with increasing midline shift from right to left and increasing compression of the anterior horn right lateral ventricle. The size of the enhancing lesion is very slightly larger by careful comparison and measurement. The degree of overall edema in the right frontal lobe has increased and now crosses the midline to the left frontal lobe. There is midline shift right to left which has increased somewhat since previous studies.  2) Therefore, the findings in the right frontal lobe are slowly progressive by multiple parameters. A distant lesion is not identified elsewhere.    CT cerebral perfusion with and without contrast (05/30/2017):  Impression:  1) Large mass lesion right frontal lobe exhibiting marked increase in permeability. There is mass effect and edema diffusely in the right frontal lobe with a high grade defect of cerebral blood flow with sporadic areas of preserved cerebral blood volume.  2) There is marked delay in the mean transit time and time to drain in the right frontal tumor  and mass region.  3) No other cerebral insults, ischemic abnormalities or parametric map abnormalities are identified elsewhere, outside of the large right frontal mass.    MRI brain with and without contrast (09/19/2017):  Impression:  1) Continued, interval decrease in right frontal lobe postoperative findings with overall decreased vasogenic edema and hemorrhage from prior. No acute hemorrhage or enhancing soft tissue focus to suggest residual enhancing tumor. No evidence for distant, enhancing lesion.  2) No acute intracranial pathology.    MRI brain with and without contrast (11/14/17)  Impression:  1. Complex septated cystic mass with perimeter enhancement right frontal  region surrounded by gliotic increased FLAIR and T2 signal radiating  into the contralateral left frontal lobe and surrounding the mass in the  right frontal lobe. 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  2. The size of the right frontal cystic lesion, the degree of perimeter  enhancement and the overall degree of abnormal FLAIR and T2 signal in  the frontal lobes is stable without change or progression when compared  to previous studies of 09/19/2017.  3. Further, new or enhancing lesion elsewhere is not currently  identified. Therefore, the findings in the brain, more specifically in  the frontal lobes, are stable when compared to the most recent studies.    MRI brain with and without contrast (04/23/2018):  Impression: Stable appearance of cystic collection right frontal lobe with surrounding vasogenic edema and/or nonenhancing tumor. Peripheral enhancement is persistent; however, no new enhancement, progression or enhancing nodularity is identified. No abnormal enhancement otherwise noted. No developing hydrocephalus.    IMPRESSION AND PLAN  Ms. Alvarez is a 43 y.o., white female with:  1. Glioma: Initially diagnosed in 2007, with recurrences in 2014, late 2016, and, most recently, summer 2017. Now status post  resection x 3 (in 2007, 2014 and 06/21/2017) and localized radiation x 2 (adjuvantly in 2007; and, most recently, a Cyberknife boost delivered in January 2017). Since a third resection was felt to not be in her best interest (at least at the time, in early 2017) and she completed localized XRT, we agreed with neurosurgery's recommendation to start chemotherapy. She began Temodar (patient dose: 320 mg daily on days 1-5 of a 28 day cycle) by late January 2017 and completed five (5), qmonthly cycles. She tolerated this regimen overall well, with some mild, and manageable nausea and increasing fatigue her only noticeable side effects. With this treatment, her CBCs remained unremarkable with no developing cytopenias. Unfortunately, despite this therapy, she developed reworsening headaches; and a repeat MRI in late May 2017 was consistent with reprogressive disease. As a result, she underwent a repeat craniotomy on 06/21/17. She tolerated this debulking surgery well, and she was subsequently started on second-line, palliative therapy with [id number] Avastin. She began this treatment on 08/16/2017, has received a total of fourteen (14) cycles to date (due to her needing to undergo placement of an epidural spinal cord stimulator to treat her worsening, chronic back pain, a delay of about six weeks was required in between her eighth and ninth cycles). The most recent repeat MRI of the brain (performed on 04/23/2018 and summarized above) again showed no evidence of disease progression. We will proceed with her current treatment regimen as planned (fifteenth cycle of bevacizumab today). She will follow up with neurosurgery with a repeat MRI again in about four months (~late August 2018). We will see her back in our clinic in both two months, on the day of the nineteenth cycle of bevacizumab, for a symptom check and in four months (also on a bevacizumab treatment day), shortly after the repeat MRI and neurosurgery.  2. Chronic  back pain: As discussed above, her bevacizumab had to be held for about six weeks in late 2017/early 2018 to allow placement of an epidural spinal cord stimulator. This was successfully done on 01/08/2018, to good palliative effect. Continue to follow up with neurosurgery, as previously planned.  3. Nausea: Continue prn Zofran, compazine and dramamine. Continue to monitor.  4. Chronic sinusitis/allergies: Ongoing management per primary care.  The patient was in agreement with these plans.    ACO Quality Measures:  a) The patient does not use (and has never used) tobacco products.  b) The patient's BMI is above normal parameters. Plan includes a referral to primary care.  c) The current outpatient and discharge medications have been reconciled for the patient.    It is a pleasure to participate in Ms. Devlin's care. Please do not hesitate to call with any questions or concerns that you may have.    A total of 20 minutes were spent coordinating this patient’s care in clinic today; more than 50% of this time was spent face-to-face with the patient, reviewing her interim medical history, discussing the results of the recent repeat MRI and counseling on the current treatment and followup plan. All questions were answered to her satisfaction.    FOLLOW UP  Proceed with palliative, [id number] bevacizumab (Avastin) as planned (15th cycle today). With neurosurgery in about 4 months (~late August 2018) with a repeat MRI of the brain, as previously planned. Return to our clinic in 2 months (on the day of the 19th cycle of Avastin) and in 4 months (on the day of the 23rd cycle of Avastin).      This document was electronically signed by JEFFY Pinto MD May 3, 2018 10:26 AM      CC: MD Cirilo Becerra MD Marta Hayne, MD Beverly Paige Neace, PA

## 2018-07-16 NOTE — PROGRESS NOTES
Name:  Marcelina Alvarez  :  1975  Date:  2018     REFERRING PHYSICIAN  David Gregorio MD    PRIMARY CARE PROVIDER  TAYLOR Heard    REASON FOR FOLLOWUP  1. Glioma (CMS/HCC)      CHIEF COMPLAINT  Follow up of Glioma/toxicity check     Dear Dr. Gregorio,    HISTORY OF PRESENT ILLNESS:   I saw Ms. Alvarez in follow up today in our medical oncology clinic. 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 She was initially diagnosed in , and you performed a successful resection. She subsequently underwent localized XRT (but no chemotherapy). She did very well until , when she developed a localized recurrence; and, again, you performed a successful resection (this time without adjuvant XRT). She was again doing well until late , when she started to develop some mild forgetfulness and headaches; and a repeat MRI showed evidence of recurrent disease again. This time, it was felt that repeat surgery was not in her best interest (at least at that time). She underwent Cyberknife treatment in early 2017 and tolerated it well. She was subsequently referred to our clinic to consider chemotherapy (Temodar). At the time of her initial appointment with us (on 2017), she was agreeable to this treatment (patient dose: 320 mg on days 1-5 of a 28-day cycle). She did overall well on this regimen for several months; however, unfortunately, repeat imaging in May 2017 showed evidence of disease reprogression. She underwent a repeat craniotomy for debulking in early summer 2017 and has been doing overall well since then on second-line palliative therapy with [id number] Avastin.    INTERIM HISTORY:  Ms. Alvarez returns to clinic today for follow up. She began palliative [id number] Avastin on 2017, has received a total of nineteen (19) cycles to date and is overall still tolerating them well. She underwent a  "repeat neurosurgery and MRI evaluation in late April 2018, which showed no evidence of disease progression. The Avastin had to be held for about six weeks in late 2017 in order for her to undergo the planned placement of an epidural spinal cord stimulator for chronic, worsening low back pain which was done on 1/8/18. She says this procedure significantly helped with her pain. Since her last visit, she reports she has been doing well and denies any new complaints today.     Past Medical History:   Diagnosis Date   • Anxiety and depression    • Arthritis     RIGHT FOOT \"DUE TO FOUR STRESS FRACTURES\"    • Brain tumor (CMS/HCC) 2010,2014,2017    SURGERY x3, CHEMO AND RADIATON    • Diabetes mellitus (CMS/HCC)     DX'D TYPE II NIDDM APPROX 6 WEEKS AGO, \"CAUSED BY CHEMO\" CHECKS BS -150 IN AM -275 PM    • Eczema    • Frequent headaches    • GERD (gastroesophageal reflux disease)    • History of shingles     MULTIPLE OUTBREAKS--NO OPEN AREAS AT THIS TIME \"SHOW UP JUST ABOVE BUTTOCKS\"    • Hypertension     CONTROLLED WITH MEDS PER PT    • IBS (irritable bowel syndrome)    • Low back pain    • Nerve damage     from shingles   • PONV (postoperative nausea and vomiting)    • Prophylactic chemotherapy     receives chemotherapy regularly due to brain tumor recurrence    • Sinus disease    • Wears reading eyeglasses        Past Surgical History:   Procedure Laterality Date   • BREAST SURGERY      REDUCTION    • CHOLECYSTECTOMY     • CRANIOTOMY FOR TUMOR Right 6/21/2017    Procedure: CRANIOTOMY FOR TUMOR STEREOTACTIC WITH STEALTH; planned right frontal lobectomy for astrocytoma progression;  Surgeon: David Gregorio MD;  Location: [facility] [place];  Service:    • CRANIOTOMY FOR TUMOR  12/05/2014    CRANI FOR GLIOMA (Dr. Gregorio)   • CRANIOTOMY FOR TUMOR  08/31/2007    CRANI & EXCISION OF LOW GRADE GLIOMA & LUMBAR DRAIN (Dr. Gregorio)   • ENDOMETRIAL ABLATION     • PORTACATH PLACEMENT     • SPINAL CORD STIMULATOR IMPLANT N/A " 1/8/2018    Procedure: SPINAL CORD STIMULATOR INSERTION PHASE;  Surgeon: Cirilo Lemons MD;  Location: [facility];  Service:        Social History     Social History   • Marital status:      Spouse name: N/A   • Number of children: N/A   • Years of education: N/A     Occupational History   • Not on file.     Social History Main Topics   • Smoking status: Never Smoker   • Smokeless tobacco: Never Used   • Alcohol use No   • Drug use: No   • Sexual activity: Defer     Other Topics Concern   • Not on file     Social History Narrative   • No narrative on file       Family History   Problem Relation Age of Onset   • Hypertension Father         pulmonary       Allergies   Allergen Reactions   • Morphine And Related Hallucinations   • Diamox [Acetazolamide] Rash       Current Outpatient Prescriptions   Medication Sig Dispense Refill   • ACCU-CHEK JACOB PLUS test strip USE BID  3   • ACCU-CHEK SOFTCLIX LANCETS lancets      • ARIPiprazole (ABILIFY) 5 MG tablet Take 5 mg by mouth Daily.     • bevacizumab (AVASTIN) 100 MG/4ML chemo injection Infuse  into a venous catheter.     • clonazePAM (KlonoPIN) 1 MG tablet Take 1 mg by mouth 2 (Two) Times a Day As Needed.     • cyclobenzaprine (FLEXERIL) 5 MG tablet TK 1 T PO  Q 6 H PRF MUSCLE SPASMS  0   • Dulaglutide (TRULICITY) 0.75 MG/0.5ML solution pen-injector Inject  under the skin.     • gabapentin (NEURONTIN) 800 MG tablet Take 800 mg by mouth 3 (Three) Times a Day.     • HYDROcodone-acetaminophen (NORCO) 7.5-325 MG per tablet Take 1 tablet by mouth Every 6 (Six) Hours As Needed for Moderate Pain  (Pain). 25 tablet 0   • ibuprofen (ADVIL,MOTRIN) 800 MG tablet Take 800 mg by mouth Every 8 (Eight) Hours As Needed for Mild Pain .     • lidocaine-prilocaine (EMLA) 2.5-2.5 % cream Apply 1 hour prior to VAD access (Patient taking differently: 1 application As Needed (before port access for chemo). Apply 1 hour prior to VAD access) 30 g 5   • lisinopril-hydrochlorothiazide  (PRINZIDE,ZESTORETIC) 20-12.5 MG per tablet Take 1 tablet by mouth Daily.     • ranitidine (ZANTAC) 150 MG capsule Take 150 mg by mouth 2 (Two) Times a Day.     • sertraline (ZOLOFT) 100 MG tablet Take 150 mg by mouth Daily.     • SITagliptin (JANUVIA) 100 MG tablet Take 100 mg by mouth Daily.     • valACYclovir (VALTREX) 500 MG tablet Take 1,000 mg by mouth As Needed (shingles).     • zolpidem (AMBIEN) 5 MG tablet Take 5 mg by mouth Every Night.       No current facility-administered medications for this visit.      Facility-Administered Medications Ordered in Other Visits   Medication Dose Route Frequency Provider Last Rate Last Dose   • bevacizumab (AVASTIN) 1,100 mg in sodium chloride 0.9 % 144 mL chemo IVPB  1,100 mg Intravenous Once T Deni Pinto MD       • heparin flush (porcine) 100 UNIT/ML injection 500 Units  500 Units Intravenous PRN T Deni Pinto MD       • mupirocin (BACTROBAN) 2 % nasal ointment   Nasal BID Ирина Interiano PA-C       • sodium chloride 0.9 % flush 10 mL  10 mL Intravenous PRN T Deni Pinto MD       • sodium chloride 0.9 % infusion 250 mL  250 mL Intravenous Once T Deni Pinto MD           REVIEW OF SYSTEMS  CONSTITUTIONAL:  No fever, chills or night sweats. Intermittent fatigue.  EYES:  No blurry vision, diplopia or other vision changes.  ENT:  No hearing loss, nosebleeds or sore throat. +Chronic sinus congestion/allergies.  CARDIOVASCULAR:  No palpitations, arrhythmia, syncopal episodes or edema.  PULMONARY:  No hemoptysis, wheezing, chronic cough or shortness of breath.  GASTROINTESTINAL:  No constipation or diarrhea.  No abdominal pain. Occasional nausea (though not as much as when she was on Temodar), controlled with prn antiemetics.  GENITOURINARY:  No hematuria, kidney stones or frequent urination.  MUSCULOSKELETAL:  Chronic back pains, improved status post stimulator implantation. Diffuse joint/bone aches.  INTEGUMENTARY: No rashes or pruritus.  ENDOCRINE:  No  "excessive thirst or hot flashes.  HEMATOLOGIC:  No history of free bleeding, spontaneous bleeding or clotting.  IMMUNOLOGIC:  No allergies or frequent infections.  NEUROLOGIC: Chronic nerve pain in the lower back, now much improved status post epidural spinal cord stimulator in early January 2018. Intermittent headaches, stable.  PSYCHIATRIC:  No anxiety or depression.    PHYSICAL EXAMINATION  /89   Pulse 80   Temp 96.6 °F (35.9 °C) (Oral)   Resp 18   Wt 111 kg (244 lb 14.4 oz)   SpO2 96%   BMI 44.79 kg/m²   GENERAL:  A well-developed, well-nourished, obese, white female in no acute distress.  HEENT:  Pupils equally round and reactive to light.  Extraocular muscles intact.  CARDIOVASCULAR:  Regular rate and rhythm.  No murmurs, gallops or rubs.  LUNGS:  Clear to auscultation bilaterally.  ABDOMEN:  Soft, nontender, nondistended with positive bowel sounds.  EXTREMITIES:  No clubbing, cyanosis or edema bilaterally.  SKIN:  No rashes or petechiae.  NEURO:  Cranial nerves grossly intact.  No focal deficits.  PSYCH:  Alert and oriented x3.    LABORATORY    Lab Results   Component Value Date    WBC 7.64 07/16/2018    HGB 15.9 07/16/2018    HCT 46.4 07/16/2018    MCV 81.5 07/16/2018     07/16/2018    NEUTROABS 3.99 07/16/2018       Lab Results   Component Value Date     07/16/2018    K 4.4 07/16/2018     07/16/2018    CO2 23.1 (L) 07/16/2018    BUN 12 07/16/2018    CREATININE 0.84 07/16/2018    GLUCOSE 184 (H) 07/16/2018    CALCIUM 9.1 07/16/2018    AST 30 04/05/2018    ALT 50 (H) 04/05/2018    ALKPHOS 57 04/05/2018    BILITOT 0.5 04/05/2018    PROTEINTOT 7.1 04/05/2018    ALBUMIN 4.00 04/05/2018     IMAGING  MRI brain with and without contrast (06/13/2016):  Impression: There are bilateral frontal changes related to prior surgery, right greater than left. There is some associated gliosis and minimal contrast enhancement in the superior leftward aspect of the surgical \"bed\". Most importantly, " there has been no change since 02/22/2016.    MRI cyberknife brain with contrast (12/28/2016):  Impression: Areas of increased blood flow in the right frontal lobe in areas of abnormal contrast enhancement and therefore the abnormality seen on prior MRI is highly suspicious for tumor.    MRI brain with and without contrast (02/28/2017):  Impression: Slight interval increase in the size of the area of abnormal contrast enhancement diffusely throughout the right frontal region. The amount of surrounding edema is also increased in the interval. Findings may be related to progression of disease however postradiation changes cannot be excluded. The edema extends into the right basal ganglia and parietal lobe. No new area of abnormal contrast enhancement identified. [Per a neurosurgery read and evaluation that day, the intensity of the enhancement is somewhat lessened, and the flare appears to be about the same.]    MRI brain with and without contrast (04/27/2017):  Impression: Increased cerebral blood volume in the area of abnormal contrast enhancement most consistent with neovascularity and tumor progression. The size and surrounding edema is stable.    MRI brain with and without contrast (05/30/2017):  Impression:  1) Intense, enhancing lesion right frontal lobe with interval evidence of increasing mass effect and edema with increasing midline shift from right to left and increasing compression of the anterior horn right lateral ventricle. The size of the enhancing lesion is very slightly larger by careful comparison and measurement. The degree of overall edema in the right frontal lobe has increased and now crosses the midline to the left frontal lobe. There is midline shift right to left which has increased somewhat since previous studies.  2) Therefore, the findings in the right frontal lobe are slowly progressive by multiple parameters. A distant lesion is not identified elsewhere.    CT cerebral perfusion with and  without contrast (05/30/2017):  Impression:  1) Large mass lesion right frontal lobe exhibiting marked increase in permeability. There is mass effect and edema diffusely in the right frontal lobe with a high grade defect of cerebral blood flow with sporadic areas of preserved cerebral blood volume.  2) There is marked delay in the mean transit time and time to drain in the right frontal tumor and mass region.  3) No other cerebral insults, ischemic abnormalities or parametric map abnormalities are identified elsewhere, outside of the large right frontal mass.    MRI brain with and without contrast (09/19/2017):  Impression:  1) Continued, interval decrease in right frontal lobe postoperative findings with overall decreased vasogenic edema and hemorrhage from prior. No acute hemorrhage or enhancing soft tissue focus to suggest residual enhancing tumor. No evidence for distant, enhancing lesion.  2) No acute intracranial pathology.    MRI brain with and without contrast (11/14/17)  Impression:  1. Complex septated cystic mass with perimeter enhancement right frontal  region surrounded by gliotic increased FLAIR and T2 signal radiating  into the contralateral left frontal lobe and surrounding the mass in the  right frontal lobe. 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  2. The size of the right frontal cystic lesion, the degree of perimeter  enhancement and the overall degree of abnormal FLAIR and T2 signal in  the frontal lobes is stable without change or progression when compared  to previous studies of 09/19/2017.  3. Further, new or enhancing lesion elsewhere is not currently  identified. Therefore, the findings in the brain, more specifically in  the frontal lobes, are stable when compared to the most recent studies.    MRI brain with and without contrast (04/23/2018):  Impression: Stable appearance of cystic collection right frontal lobe with surrounding vasogenic edema and/or  nonenhancing tumor. Peripheral enhancement is persistent; however, no new enhancement, progression or enhancing nodularity is identified. No abnormal enhancement otherwise noted. No developing hydrocephalus.    IMPRESSION AND PLAN  Ms. Alvarez is a 43 y.o., white female with:  1. Glioma: Initially diagnosed in 2007, with recurrences in 2014, late 2016, and, most recently, summer 2017. Now status post resection x 3 (in 2007, 2014 and 06/21/2017) and localized radiation x 2 (adjuvantly in 2007; and, most recently, a Cyberknife boost delivered in January 2017). Since a third resection was felt to not be in her best interest (at least at the time, in early 2017) and she completed localized XRT, we agreed with neurosurgery's recommendation to start chemotherapy. She began Temodar (patient dose: 320 mg daily on days 1-5 of a 28 day cycle) by late January 2017 and completed five (5), qmonthly cycles. She tolerated this regimen overall well, with some mild, and manageable nausea and increasing fatigue her only noticeable side effects. With this treatment, her CBCs remained unremarkable with no developing cytopenias. Unfortunately, despite this therapy, she developed reworsening headaches; and a repeat MRI in late May 2017 was consistent with reprogressive disease. As a result, she underwent a repeat craniotomy on 06/21/17. She tolerated this debulking surgery well, and she was subsequently started on second-line, palliative therapy with [id number] Avastin. She began this treatment on 08/16/2017, has received a total of nineteen (19) cycles to date (due to her needing to undergo placement of an epidural spinal cord stimulator to treat her worsening, chronic back pain, a delay of about six weeks was required in between her eighth and ninth cycles). The most recent repeat MRI of the brain (performed on 04/23/2018 and summarized above) again showed no evidence of disease progression and she continues to tolerate this treatment  well. We will proceed with her current treatment regimen as planned (20th cycle of bevacizumab today). She will follow up with neurosurgery with a repeat MRI again in ~late August 2018. We will see her back in our clinic on a bevacizumab treatment day, shortly after the repeat MRI and neurosurgery for another symptom/toxicity check.  2. Chronic back pain: As discussed above, her bevacizumab had to be held for about six weeks in late 2017/early 2018 to allow placement of an epidural spinal cord stimulator. This was successfully done on 01/08/2018, to good palliative effect. Continue to follow up with neurosurgery, as previously planned.  3. Nausea: Currently denies. Continue prn Zofran, compazine and dramamine. Continue to monitor.  4. Chronic sinusitis/allergies: Ongoing management per primary care.    The patient was in agreement with these plans.    ACO Quality Measures:  a) The patient does not use (and has never used) tobacco products.  b) The patient's BMI is above normal parameters. Plan includes a referral to primary care.  c) The current outpatient and discharge medications have been reconciled for the patient.    It is a pleasure to participate in Ms. Devlin's care. Please do not hesitate to call with any questions or concerns that you may have.    A total of 15 minutes were spent coordinating this patient’s care in clinic today; more than 50% of this time was spent face-to-face with the patient, reviewing her interim medical history and counseling on the current treatment and followup plan. All questions were answered to her satisfaction.    FOLLOW UP  Proceed with palliative, [id number] bevacizumab (Avastin) as planned (20th cycle today). With neurosurgery in~late August 2018 with a repeat MRI of the brain, as previously planned.We will see her back in our clinic on a bevacizumab treatment day, shortly after the repeat MRI and neurosurgery for another symptom/toxicity check.      This document was  electronically signed by Bonita Krueger, APRN July 16, 2018 10:07 AM      CC: MD Cirilo Becerra MD Marta Hayne, MD Beverly Paige Neace, PA

## 2018-07-31 NOTE — TELEPHONE ENCOUNTER
I have called the patient as a follow up in regards to her treatment yesterday. The patient said that she is doing good, just a little tired (which is to be expected). I have encouraged her to call back if she has any questions, problems, or concerns. She verbalized understanding.

## 2018-08-07 NOTE — PROGRESS NOTES
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  He was initially resected in 2007.  She had a recurrence in 2017 which was followed by [facility].  She was on Temodar without effect and was switched to Avastin.  She continues to do well with no specific symptoms and reports for follow-up and surveillance MRI of the brain.           Past Medical History:   Diagnosis Date   • Anxiety and depression    • Arthritis     RIGHT FOOT \"DUE TO FOUR STRESS FRACTURES\"    • Brain tumor (CMS/HCC) 2010,2014,2017    SURGERY x3, CHEMO AND RADIATON    • Diabetes mellitus (CMS/HCC)     DX'D TYPE II NIDDM APPROX 6 WEEKS AGO, \"CAUSED BY CHEMO\" CHECKS BS -150 IN AM -275 PM    • Eczema    • Frequent headaches    • GERD (gastroesophageal reflux disease)    • History of shingles     MULTIPLE OUTBREAKS--NO OPEN AREAS AT THIS TIME \"SHOW UP JUST ABOVE BUTTOCKS\"    • Hypertension     CONTROLLED WITH MEDS PER PT    • IBS (irritable bowel syndrome)    • Low back pain    • Nerve damage     from shingles   • PONV (postoperative nausea and vomiting)    • Prophylactic chemotherapy     receives chemotherapy regularly due to brain tumor recurrence    • Sinus disease    • Wears reading eyeglasses               Past Surgical History:   Procedure Laterality Date   • BREAST SURGERY      REDUCTION    • CHOLECYSTECTOMY     • CRANIOTOMY FOR TUMOR Right 6/21/2017    Procedure: CRANIOTOMY FOR TUMOR STEREOTACTIC WITH STEALTH; 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 Gregorio)   • CRANIOTOMY FOR TUMOR  08/31/2007    CRANI & EXCISION OF LOW GRADE GLIOMA & LUMBAR DRAIN (Dr. Gregorio)   • ENDOMETRIAL ABLATION     • PORTACATH PLACEMENT   "   • SPINAL CORD STIMULATOR IMPLANT N/A 1/8/2018    Procedure: SPINAL CORD STIMULATOR INSERTION PHASE;  Surgeon: Cirilo Lemons MD;  Location: [facility];  Service:               Family History   Problem Relation Age of Onset   • Hypertension Father         pulmonary              Social History     Social History   • Marital status:      Spouse name: N/A   • Number of children: N/A   • Years of education: N/A     Occupational History   • Not on file.     Social History Main Topics   • Smoking status: Never Smoker   • Smokeless tobacco: Never Used   • Alcohol use No   • Drug use: No   • Sexual activity: Defer     Other Topics Concern   • Not on file     Social History Narrative   • No narrative on file              Allergies   Allergen Reactions   • Morphine And Related Hallucinations   • Diamox [Acetazolamide] Rash              Current Outpatient Prescriptions:   •  ACCU-CHEK JACOB PLUS test strip, USE BID, Disp: , Rfl: 3  •  ACCU-CHEK SOFTCLIX LANCETS lancets, , Disp: , Rfl:   •  ARIPiprazole (ABILIFY) 5 MG tablet, Take 5 mg by mouth Daily., Disp: , Rfl:   •  bevacizumab (AVASTIN) 100 MG/4ML chemo injection, Infuse  into a venous catheter., Disp: , Rfl:   •  clonazePAM (KlonoPIN) 1 MG tablet, Take 1 mg by mouth 2 (Two) Times a Day As Needed., Disp: , Rfl:   •  cyclobenzaprine (FLEXERIL) 5 MG tablet, TK 1 T PO  Q 6 H PRF MUSCLE SPASMS, Disp: , Rfl: 0  •  Dulaglutide (TRULICITY) 0.75 MG/0.5ML solution pen-injector, Inject  under the skin., Disp: , Rfl:   •  gabapentin (NEURONTIN) 800 MG tablet, Take 800 mg by mouth 3 (Three) Times a Day., Disp: , Rfl:   •  HYDROcodone-acetaminophen (NORCO) 7.5-325 MG per tablet, Take 1 tablet by mouth Every 6 (Six) Hours As Needed for Moderate Pain  (Pain)., Disp: 25 tablet, Rfl: 0  •  ibuprofen (ADVIL,MOTRIN) 800 MG tablet, Take 800 mg by mouth Every 8 (Eight) Hours As Needed for Mild Pain ., Disp: , Rfl:   •  lidocaine-prilocaine (EMLA) 2.5-2.5 % cream, Apply 1 hour prior to  VAD access (Patient taking differently: 1 application As Needed (before port access for chemo). Apply 1 hour prior to VAD access), Disp: 30 g, Rfl: 5  •  lisinopril-hydrochlorothiazide (PRINZIDE,ZESTORETIC) 20-12.5 MG per tablet, Take 1 tablet by mouth Daily., Disp: , Rfl:   •  ranitidine (ZANTAC) 150 MG capsule, Take 150 mg by mouth 2 (Two) Times a Day., Disp: , Rfl:   •  sertraline (ZOLOFT) 100 MG tablet, Take 150 mg by mouth Daily., Disp: , Rfl:   •  SITagliptin (JANUVIA) 100 MG tablet, Take 100 mg by mouth Daily., Disp: , Rfl:   •  valACYclovir (VALTREX) 500 MG tablet, Take 1,000 mg by mouth As Needed (shingles)., Disp: , Rfl:   •  zolpidem (AMBIEN) 5 MG tablet, Take 5 mg by mouth Every Night., Disp: , Rfl:   No current facility-administered medications for this visit.     Facility-Administered Medications Ordered in Other Visits:   •  mupirocin (BACTROBAN) 2 % nasal ointment, , Nasal, BID, Ирина Interiano, KIMBERLY         Review of Systems   Constitutional: Negative for activity change, appetite change, chills, diaphoresis, fatigue, fever and unexpected weight change.   HENT: Negative for congestion, dental problem, drooling, ear discharge, ear pain, facial swelling, hearing loss, mouth sores, nosebleeds, postnasal drip, rhinorrhea, sinus pressure, sneezing, sore throat, tinnitus, trouble swallowing and voice change.    Eyes: Negative for photophobia, pain, discharge, redness, itching and visual disturbance.   Respiratory: Negative for apnea, cough, choking, chest tightness, shortness of breath, wheezing and stridor.    Cardiovascular: Negative for chest pain, palpitations and leg swelling.   Gastrointestinal: Negative for abdominal distention, abdominal pain, anal bleeding, blood in stool, constipation, diarrhea, nausea, rectal pain and vomiting.   Endocrine: Negative for cold intolerance, heat intolerance, polydipsia, polyphagia and polyuria.   Genitourinary: Negative for decreased urine volume, difficulty  "urinating, dysuria, enuresis, flank pain, frequency, genital sores, hematuria and urgency.   Musculoskeletal: Negative for arthralgias, back pain, gait problem, joint swelling, myalgias, neck pain and neck stiffness.   Skin: Negative for color change, pallor, rash and wound.   Allergic/Immunologic: Negative for environmental allergies, food allergies and immunocompromised state.   Neurological: Negative for dizziness, tremors, seizures, syncope, facial asymmetry, speech difficulty, weakness, light-headedness, numbness and headaches.   Hematological: Negative for adenopathy. Does not bruise/bleed easily.   Psychiatric/Behavioral: Negative for agitation, behavioral problems, confusion, decreased concentration, dysphoric mood, hallucinations, self-injury, sleep disturbance and suicidal ideas. The patient is not nervous/anxious and is not hyperactive.                Vitals:    08/07/18 1216   BP: 122/80   BP Location: Left arm   Patient Position: Sitting   Cuff Size: Adult   Temp: 98 °F (36.7 °C)   TempSrc: Temporal Artery    Weight: 112 kg (248 lb)   Height: 157.5 cm (62.01\")               EXAMINATION: No weakness sensory loss or reflex asymmetry.  Speech is normal.            MEDICAL DECISION MAKING: MRI of the brain indicates the presence of progression the right frontal region extending toward the basal ganglia.  This is a clear change from her most recent MRI.           ASSESSMENT/DISPOSITION: I will speak with oncology regarding alterations of chemotherapy.  She has failed to respond to Temodar; is been taking Avastin with progression.  I would hope that we will be able to formulate another pharmacological protocol undoubtedly off label.  I will also contact [facility] to see if there are any other options for her.  He return to see me in 8 weeks with repeat MRI.              I APPRECIATE THE OPPORTUNITY OF THIS REFERRAL. PLEASE CALL IF ANY       QUESTIONS [phone]    Scribed for David Gregorio MD by " Liza Hanley CMA. 8/7/2018  12:19 PM    I have read and concur with the information provided by the scribe.  David Gregorio MD

## 2018-08-13 NOTE — PROGRESS NOTES
Name:  Marcelina Alvarez  :  1975  Date:  2018     REFERRING PHYSICIAN  David Gregorio MD    PRIMARY CARE PROVIDER  Sarah Amado PA    REASON FOR FOLLOWUP  1. Glioma (CMS/HCC)      CHIEF COMPLAINT  Recently slightly increasing headaches frequency and intensity.    Dear Dr. Gregorio,    HISTORY OF PRESENT ILLNESS:   I saw Ms. Alvarez in follow up today in our medical oncology clinic. 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 She was initially diagnosed in , and you performed a successful resection. She subsequently underwent localized XRT (but no chemotherapy). She did very well until , when she developed a localized recurrence; and, again, you performed a successful resection (this time without adjuvant XRT). She was again doing well until late , when she started to develop some mild forgetfulness and headaches; and a repeat MRI showed evidence of recurrent disease again. This time, it was felt that repeat surgery was not in her best interest (at least at that time). She underwent Cyberknife treatment in early 2017 and tolerated it well. She was subsequently referred to our clinic to consider chemotherapy (Temodar). At the time of her initial appointment with us (on 2017), she was agreeable to this treatment (patient dose: 320 mg on days 1-5 of a 28-day cycle). She did overall well on this regimen for several months; however, unfortunately, repeat imaging in May 2017 showed evidence of disease reprogression. She underwent a repeat craniotomy for debulking in early summer 2017 and has been doing overall well since then on second-line palliative therapy with [id number] Avastin.    INTERIM HISTORY:  Ms. Alvarez returns to clinic today for follow up. She began palliative [id number] Avastin on 2017, has received a total of twenty-one (21) cycles to date and is overall still  "tolerating them well. The Avastin had to be held for about six weeks in late 2017 in order for her to undergo the planned placement of an epidural spinal cord stimulator for chronic, worsening low back pain which was done on 01/08/18. This procedure significantly helped with her pain. Unfortunately, for the past couple of weeks, she has been having mildly increased (in both frequency and intensity) headaches; and, unfortunately, the most recent repeat MRI of the brain and neurosurgical follow up (performed on 08/07/2018) confirmed progression of her disease.    Past Medical History:   Diagnosis Date   • Anxiety and depression    • Arthritis     RIGHT FOOT \"DUE TO FOUR STRESS FRACTURES\"    • Brain tumor (CMS/HCC) 2010,2014,2017    SURGERY x3, CHEMO AND RADIATON    • Diabetes mellitus (CMS/HCC)     DX'D TYPE II NIDDM APPROX 6 WEEKS AGO, \"CAUSED BY CHEMO\" CHECKS BS -150 IN AM -275 PM    • Eczema    • Frequent headaches    • GERD (gastroesophageal reflux disease)    • History of shingles     MULTIPLE OUTBREAKS--NO OPEN AREAS AT THIS TIME \"SHOW UP JUST ABOVE BUTTOCKS\"    • Hypertension     CONTROLLED WITH MEDS PER PT    • IBS (irritable bowel syndrome)    • Low back pain    • Nerve damage     from shingles   • PONV (postoperative nausea and vomiting)    • Prophylactic chemotherapy     receives chemotherapy regularly due to brain tumor recurrence    • Sinus disease    • Wears reading eyeglasses        Past Surgical History:   Procedure Laterality Date   • BREAST SURGERY      REDUCTION    • CHOLECYSTECTOMY     • CRANIOTOMY FOR TUMOR Right 6/21/2017    Procedure: CRANIOTOMY FOR TUMOR STEREOTACTIC WITH STEALTH; planned right frontal lobectomy for astrocytoma progression;  Surgeon: David Gregorio MD;  Location: [facility] [place];  Service:    • CRANIOTOMY FOR TUMOR  12/05/2014    CRANI FOR GLIOMA (Dr. Gregorio)   • CRANIOTOMY FOR TUMOR  08/31/2007    CRANI & EXCISION OF LOW GRADE GLIOMA & LUMBAR DRAIN (Dr. Gregorio)   • " ENDOMETRIAL ABLATION     • PORTACATH PLACEMENT     • SPINAL CORD STIMULATOR IMPLANT N/A 1/8/2018    Procedure: SPINAL CORD STIMULATOR INSERTION PHASE;  Surgeon: Cirilo Lemons MD;  Location: [facility];  Service:        Social History     Social History   • Marital status:      Spouse name: N/A   • Number of children: N/A   • Years of education: N/A     Occupational History   • Not on file.     Social History Main Topics   • Smoking status: Never Smoker   • Smokeless tobacco: Never Used   • Alcohol use No   • Drug use: No   • Sexual activity: Defer     Other Topics Concern   • Not on file     Social History Narrative   • No narrative on file       Family History   Problem Relation Age of Onset   • Hypertension Father         pulmonary       Allergies   Allergen Reactions   • Morphine And Related Hallucinations   • Diamox [Acetazolamide] Rash       Current Outpatient Prescriptions   Medication Sig Dispense Refill   • ACCU-CHEK JACOB PLUS test strip USE BID  3   • ACCU-CHEK SOFTCLIX LANCETS lancets      • ARIPiprazole (ABILIFY) 5 MG tablet Take 5 mg by mouth Daily.     • bevacizumab (AVASTIN) 100 MG/4ML chemo injection Infuse  into a venous catheter.     • clonazePAM (KlonoPIN) 1 MG tablet Take 1 mg by mouth 2 (Two) Times a Day As Needed.     • cyclobenzaprine (FLEXERIL) 5 MG tablet TK 1 T PO  Q 6 H PRF MUSCLE SPASMS  0   • Dulaglutide (TRULICITY) 0.75 MG/0.5ML solution pen-injector Inject  under the skin.     • gabapentin (NEURONTIN) 800 MG tablet Take 800 mg by mouth 3 (Three) Times a Day.     • HYDROcodone-acetaminophen (NORCO) 7.5-325 MG per tablet Take 1 tablet by mouth Every 6 (Six) Hours As Needed for Moderate Pain  (Pain). 25 tablet 0   • ibuprofen (ADVIL,MOTRIN) 800 MG tablet Take 800 mg by mouth Every 8 (Eight) Hours As Needed for Mild Pain .     • lidocaine-prilocaine (EMLA) 2.5-2.5 % cream Apply 1 hour prior to VAD access (Patient taking differently: 1 application As Needed (before port access for  chemo). Apply 1 hour prior to VAD access) 30 g 5   • lisinopril-hydrochlorothiazide (PRINZIDE,ZESTORETIC) 20-12.5 MG per tablet Take 1 tablet by mouth Daily.     • ranitidine (ZANTAC) 150 MG capsule Take 150 mg by mouth 2 (Two) Times a Day.     • sertraline (ZOLOFT) 100 MG tablet Take 150 mg by mouth Daily.     • SITagliptin (JANUVIA) 100 MG tablet Take 100 mg by mouth Daily.     • valACYclovir (VALTREX) 500 MG tablet Take 1,000 mg by mouth As Needed (shingles).     • zolpidem (AMBIEN) 5 MG tablet Take 5 mg by mouth Every Night.       No current facility-administered medications for this visit.      Facility-Administered Medications Ordered in Other Visits   Medication Dose Route Frequency Provider Last Rate Last Dose   • mupirocin (BACTROBAN) 2 % nasal ointment   Nasal BID Ирина Interiano, KIMBERLY         REVIEW OF SYSTEMS  CONSTITUTIONAL:  No fever, chills or night sweats. Intermittent fatigue.  EYES:  No blurry vision, diplopia or other vision changes.  ENT:  No hearing loss, nosebleeds or sore throat. Chronic sinus congestion/allergies.  CARDIOVASCULAR:  No palpitations, arrhythmia, syncopal episodes or edema.  PULMONARY:  No hemoptysis, wheezing, chronic cough or shortness of breath.  GASTROINTESTINAL:  No constipation or diarrhea.  No abdominal pain. Occasional nausea (though not as much as when she was on Temodar), controlled with prn antiemetics.  GENITOURINARY:  No hematuria, kidney stones or frequent urination.  MUSCULOSKELETAL:  Chronic back pains, improved status post stimulator implantation. Diffuse joint/bone aches.  INTEGUMENTARY: No rashes or pruritus.  ENDOCRINE:  No excessive thirst or hot flashes.  HEMATOLOGIC:  No history of free bleeding, spontaneous bleeding or clotting.  IMMUNOLOGIC:  No allergies or frequent infections.  NEUROLOGIC: Chronic nerve pain in the lower back, now much improved status post epidural spinal cord stimulator in early January 2018. Intermittent headaches, recently a getting  "a little worse, as per the HPI above.  PSYCHIATRIC:  No anxiety or depression.    PHYSICAL EXAMINATION  /83   Pulse 70   Temp 97.1 °F (36.2 °C) (Oral)   Resp 20   Wt 111 kg (244 lb 9.6 oz)   SpO2 98%   BMI 44.73 kg/m²     GENERAL:  A well-developed, well-nourished, obese, white female in no acute distress.  HEENT:  Pupils equally round and reactive to light.  Extraocular muscles intact.  CARDIOVASCULAR:  Regular rate and rhythm.  No murmurs, gallops or rubs.  LUNGS:  Clear to auscultation bilaterally.  ABDOMEN:  Soft, nontender, nondistended with positive bowel sounds.  EXTREMITIES:  No clubbing, cyanosis or edema bilaterally.  SKIN:  No rashes or petechiae.  NEURO:  Cranial nerves grossly intact.  No focal deficits.  PSYCH:  Alert and oriented x3.    LABORATORY    Lab Results   Component Value Date    WBC 7.13 07/30/2018    HGB 15.4 07/30/2018    HCT 45.6 07/30/2018    MCV 81.9 07/30/2018     07/30/2018    NEUTROABS 3.87 07/30/2018       Lab Results   Component Value Date     07/30/2018    K 4.8 07/30/2018     07/30/2018    CO2 23.1 (L) 07/30/2018    BUN 11 07/30/2018    CREATININE 0.87 07/30/2018    GLUCOSE 321 (H) 07/30/2018    CALCIUM 9.5 07/30/2018    AST 30 04/05/2018    ALT 50 (H) 04/05/2018    ALKPHOS 57 04/05/2018    BILITOT 0.5 04/05/2018    PROTEINTOT 7.1 04/05/2018    ALBUMIN 4.00 04/05/2018     IMAGING  MRI brain with and without contrast (06/13/2016):  Impression: There are bilateral frontal changes related to prior surgery, right greater than left. There is some associated gliosis and minimal contrast enhancement in the superior leftward aspect of the surgical \"bed\". Most importantly, there has been no change since 02/22/2016.    MRI cyberknife brain with contrast (12/28/2016):  Impression: Areas of increased blood flow in the right frontal lobe in areas of abnormal contrast enhancement and therefore the abnormality seen on prior MRI is highly suspicious for " tumor.    MRI brain with and without contrast (02/28/2017):  Impression: Slight interval increase in the size of the area of abnormal contrast enhancement diffusely throughout the right frontal region. The amount of surrounding edema is also increased in the interval. Findings may be related to progression of disease however postradiation changes cannot be excluded. The edema extends into the right basal ganglia and parietal lobe. No new area of abnormal contrast enhancement identified. [Per a neurosurgery read and evaluation that day, the intensity of the enhancement is somewhat lessened, and the flare appears to be about the same.]    MRI brain with and without contrast (04/27/2017):  Impression: Increased cerebral blood volume in the area of abnormal contrast enhancement most consistent with neovascularity and tumor progression. The size and surrounding edema is stable.    MRI brain with and without contrast (05/30/2017):  Impression:  1) Intense, enhancing lesion right frontal lobe with interval evidence of increasing mass effect and edema with increasing midline shift from right to left and increasing compression of the anterior horn right lateral ventricle. The size of the enhancing lesion is very slightly larger by careful comparison and measurement. The degree of overall edema in the right frontal lobe has increased and now crosses the midline to the left frontal lobe. There is midline shift right to left which has increased somewhat since previous studies.  2) Therefore, the findings in the right frontal lobe are slowly progressive by multiple parameters. A distant lesion is not identified elsewhere.    CT cerebral perfusion with and without contrast (05/30/2017):  Impression:  1) Large mass lesion right frontal lobe exhibiting marked increase in permeability. There is mass effect and edema diffusely in the right frontal lobe with a high grade defect of cerebral blood flow with sporadic areas of preserved  cerebral blood volume.  2) There is marked delay in the mean transit time and time to drain in the right frontal tumor and mass region.  3) No other cerebral insults, ischemic abnormalities or parametric map abnormalities are identified elsewhere, outside of the large right frontal mass.    MRI brain with and without contrast (09/19/2017):  Impression:  1) Continued, interval decrease in right frontal lobe postoperative findings with overall decreased vasogenic edema and hemorrhage from prior. No acute hemorrhage or enhancing soft tissue focus to suggest residual enhancing tumor. No evidence for distant, enhancing lesion.  2) No acute intracranial pathology.    MRI brain with and without contrast (11/14/17)  Impression:  1. Complex septated cystic mass with perimeter enhancement right frontal  region surrounded by gliotic increased FLAIR and T2 signal radiating  into the contralateral left frontal lobe and surrounding the mass in the  right frontal lobe. 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  2. The size of the right frontal cystic lesion, the degree of perimeter  enhancement and the overall degree of abnormal FLAIR and T2 signal in  the frontal lobes is stable without change or progression when compared  to previous studies of 09/19/2017.  3. Further, new or enhancing lesion elsewhere is not currently  identified. Therefore, the findings in the brain, more specifically in  the frontal lobes, are stable when compared to the most recent studies.    MRI brain with and without contrast (04/23/2018):  Impression: Stable appearance of cystic collection right frontal lobe with surrounding vasogenic edema and/or nonenhancing tumor. Peripheral enhancement is persistent; however, no new enhancement, progression or enhancing nodularity is identified. No abnormal enhancement otherwise noted. No developing hydrocephalus.    MRI brain with and without contrast (08/07/2018):  Impression: There has been  unequivocal progression of disease when compared with the previous examination of 04/23/2018. Specifically, there is bifrontal cystic change which is stable. However, there is abnormal contrast enhancement posterior to the cystic change on the right involving the frontal lobe as well as the caudate lobe and lentiform nucleus. There is also significantly greater vasogenic edema involving the new abnormalities.    IMPRESSION AND PLAN  Ms. Alvarez is a 43 y.o., white female with:  1. Glioma: Initially diagnosed in 2007, with recurrences in 2014, late 2016, and, most recently, summer 2017. Now status post resection x 3 (in 2007, 2014 and 06/21/2017) and localized radiation x 2 (adjuvantly in 2007; and, most recently, a Cyberknife boost delivered in January 2017). Since a third resection was felt to not be in her best interest (at least at the time, in early 2017) and she completed localized XRT, we agreed with neurosurgery's recommendation to start chemotherapy. She began Temodar (patient dose: 320 mg daily on days 1-5 of a 28 day cycle) by late January 2017 and completed five (5), qmonthly cycles. She tolerated this regimen overall well, with some mild, and manageable nausea and increasing fatigue her only noticeable side effects. With this treatment, her CBCs remained unremarkable with no developing cytopenias. Unfortunately, despite this therapy, she developed reworsening headaches; and a repeat MRI in late May 2017 was consistent with reprogressive disease. As a result, she underwent a repeat craniotomy on 06/21/17. She tolerated this debulking surgery well, and she was subsequently started on second-line, palliative therapy with [id number] Avastin. She began this treatment on 08/16/2017, has received a total of twenty-one (21) cycles to date (due to her needing to undergo placement of an epidural spinal cord stimulator to treat her worsening, chronic back pain, a delay of about six weeks was required in between her  eighth and ninth cycles). While this treatment plan had been working well for a total of nearly one year now, the most recent repeat MRI of the brain (performed on 08/07/2018 and summarized above) unfortunately now shows definite disease progression. Her neurosurgeon is currently in the process of conferring with [facility] to weigh her next-line palliative treatment options. In the meantime, by default, she would be a good candidate for a trial of adding chemotherapy (irinotecan) and continuing the bevacizumab. We will begin this new [id number] regimen early next week. She will follow up with neurosurgery with a repeat MRI again in October 2018. We will see her back in our clinic in three weeks (on the day of the second cycle of irinotecan and bevacizumab) for a symptom/toxicity check.  2. Chronic back pain: As discussed above, her bevacizumab had to be held for about six weeks in late 2017/early 2018 to allow placement of an epidural spinal cord stimulator. This was successfully done on 01/08/2018, to good, ongoing, palliative effect. Continue to follow up with neurosurgery, as previously planned.  3. Headaches: A chronic issue and recently mildly worsening due to the reprogression of issue #1. She states that her symptoms currently remain manageable with prn Motrin, and she does not need something stronger (such as Norco) at this time. Continue to monitor.  4. Chronic sinusitis/allergies: Ongoing management per primary care.  The patient was in agreement with these plans.    ACO Quality Measures:  a) The patient does not use (and has never used) tobacco products.  b) The patient's BMI is above normal parameters. Plan includes a referral to primary care.  c) The current outpatient and discharge medications have been reconciled for the patient.    It is a pleasure to participate in Ms. Devlin's care. Please do not hesitate to call with any questions or concerns that you may have.    A total of 30 minutes were  spent coordinating this patient’s care in clinic today; more than 50% of this time was spent face-to-face with the patient, reviewing her interim medical history, discussing the results of the most recent repeat MRI of the brain and counseling on the current evaluation, treatment and followup plan. All questions were answered to her satisfaction.    FOLLOW UP  Discontinue palliative, [id number] single-agent bevacizumab. Begin [id number] irinotecan plus continued bevacizumab early next week. With neurosurgery as previously planned. Return to our clinic in ~3 weeks (on day #1 of cycle #2 of irinotecan/bevacizumab) with a CBC and CMP.      This document was electronically signed by JEFFY Pinto MD August 13, 2018 9:36 AM      CC: MD Cirilo Becerra MD Marta Hayne, MD Beverly Paige Neace, PA

## 2018-08-21 NOTE — TELEPHONE ENCOUNTER
43-year-old female has been followed for several years with a right frontal glioma.  She was initially resected in 2007. She had a recurrence in 2017 which was followed by [facility].  She was on Temodar without effect and was switched to Avastin.            You referred patient to [facility] and they have contacted her but she would like to discuss this with you first.   She has a follow up scheduled to see you in Oct.

## 2018-08-21 NOTE — TELEPHONE ENCOUNTER
Provider:  Jg  Caller: self  Time of call: 4:10    Phone #:  [phone] or [phone]  Surgery: SPINAL CORD STIMULATOR INSERTION PHASE    Surgery Date: 1/8/18   Last visit: 8/7/18    Next visit: 10/2/18    ANURAG:         Reason for call:       Patient states that Dr. Gregorio wants to refer her to [facility] regarding her brain tumor. They have called her and would like her to go there for all her treatments but she wants to discuss this with Dr. Gregorio first.    Please call.

## 2018-08-27 NOTE — TELEPHONE ENCOUNTER
----- Message from Mindi Calloway sent at 8/27/2018  8:57 AM EDT -----  Contact: [phone]  PATIENT CALLING STATING [facility] HAS CALLED AND TELLING HER TO DO ONE THING AND HER ONCOLOGIST IS TELLING HER TO DO SOMETHING ELSE.  SHE WOULD LIKE TO SPEAK WITH YOU.

## 2018-08-29 NOTE — PROGRESS NOTES
"SS spoke with pt this date.  Pt states that she is starting a new chemotherapy today.  Pt states that she has brain cancer.  Pt states that she has never lost her hair but was told that she may lose it this time.  Pt states that she and her daughter are concerned about her losing her hair.  Daughter concerned stating that the kids at school are so mean about different things and if they see her without hair, she is afraid they will be mean.  SS provided pt with book for her 8 Y/O daughter, \"When Momma Wore a Hat.\"  Patient states that daughter has anxiety and doesn't want to go to school.  Daughter wants to stay with mom and wanted to come to treatment today.  Pt to provide book to daughter and write note inside of book.  SS informed pt that she can order a wig from [employer] if she wants to and just wear it when she picks her daughter up from school.  Pt states that she will wait to see if she loses hair before ordering wig.  SS will follow.  "

## 2018-09-12 NOTE — PROGRESS NOTES
Name:  Marcelina Alvarez  :  1975  Date:  2018     REFERRING PHYSICIAN  David Gregorio MD    PRIMARY CARE PROVIDER  Sarah Amado PA    REASON FOR FOLLOWUP  1. Glioma (CMS/HCC)      CHIEF COMPLAINT  Follow up of Glioma and toxicity check     Dear Dr. Gregorio,    HISTORY OF PRESENT ILLNESS:   I saw Ms. Alvarez in follow up today in our medical oncology clinic. 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 She was initially diagnosed in , and you performed a successful resection. She subsequently underwent localized XRT (but no chemotherapy). She did very well until , when she developed a localized recurrence; and, again, you performed a successful resection (this time without adjuvant XRT). She was again doing well until late , when she started to develop some mild forgetfulness and headaches; and a repeat MRI showed evidence of recurrent disease again. This time, it was felt that repeat surgery was not in her best interest (at least at that time). She underwent Cyberknife treatment in early 2017 and tolerated it well. She was subsequently referred to our clinic to consider chemotherapy (Temodar). At the time of her initial appointment with us (on 2017), she was agreeable to this treatment (patient dose: 320 mg on days 1-5 of a 28-day cycle). She did overall well on this regimen for several months; however, unfortunately, repeat imaging in May 2017 showed evidence of disease reprogression. She underwent a repeat craniotomy for debulking in early summer 2017 and has been doing overall well since then on second-line palliative therapy with [id number] Avastin.    INTERIM HISTORY:  Ms. Alvarez returns to clinic today for follow up and toxicity check. Most recent repeat MRI of the brain (performed on 2018) unfortunately showed definite disease progression. Her neurosurgeon is  "currently in the process of conferring with [facility] to weigh her next-line palliative treatment options. In the meantime, a trial of adding chemotherapy (irinotecan) and continuing the bevacizumab was recommended and she was agreeable to proceed. She was started on this new treatment and has completed one cycle to date. Overall, she reports tolerating the treatment well with diarrhea as her only noticeable side effect. She reports taking imodium prn which was helpful and symptoms resolved a few days later. Clinically she is doing well today. She continues to complain of frequents headaches and takes motrin prn. She will have repeat MRI of brain on 10/2/18. She denies any new complaints today.     Past Medical History:   Diagnosis Date   • Anxiety and depression    • Arthritis     RIGHT FOOT \"DUE TO FOUR STRESS FRACTURES\"    • Brain tumor (CMS/HCC) 2010,2014,2017    SURGERY x3, CHEMO AND RADIATON    • Diabetes mellitus (CMS/HCC)     DX'D TYPE II NIDDM APPROX 6 WEEKS AGO, \"CAUSED BY CHEMO\" CHECKS BS -150 IN AM -275 PM    • Eczema    • Frequent headaches    • GERD (gastroesophageal reflux disease)    • History of shingles     MULTIPLE OUTBREAKS--NO OPEN AREAS AT THIS TIME \"SHOW UP JUST ABOVE BUTTOCKS\"    • Hypertension     CONTROLLED WITH MEDS PER PT    • IBS (irritable bowel syndrome)    • Low back pain    • Nerve damage     from shingles   • PONV (postoperative nausea and vomiting)    • Prophylactic chemotherapy     receives chemotherapy regularly due to brain tumor recurrence    • Sinus disease    • Wears reading eyeglasses        Past Surgical History:   Procedure Laterality Date   • BREAST SURGERY      REDUCTION    • CHOLECYSTECTOMY     • CRANIOTOMY FOR TUMOR Right 6/21/2017    Procedure: CRANIOTOMY FOR TUMOR STEREOTACTIC WITH STEALTH; planned right frontal lobectomy for astrocytoma progression;  Surgeon: David Gregorio MD;  Location: [facility];  Service:    • CRANIOTOMY FOR TUMOR  " 12/05/2014    CRANI FOR GLIOMA (Dr. Gregorio)   • CRANIOTOMY FOR TUMOR  08/31/2007    CRANI & EXCISION OF LOW GRADE GLIOMA & LUMBAR DRAIN (Dr. Gregorio)   • ENDOMETRIAL ABLATION     • PORTACATH PLACEMENT     • SPINAL CORD STIMULATOR IMPLANT N/A 1/8/2018    Procedure: SPINAL CORD STIMULATOR INSERTION PHASE;  Surgeon: Cirilo Lemons MD;  Location: [facility] OR;  Service:        Social History     Social History   • Marital status:      Spouse name: N/A   • Number of children: N/A   • Years of education: N/A     Occupational History   • Not on file.     Social History Main Topics   • Smoking status: Never Smoker   • Smokeless tobacco: Never Used   • Alcohol use No   • Drug use: No   • Sexual activity: Defer     Other Topics Concern   • Not on file     Social History Narrative   • No narrative on file       Family History   Problem Relation Age of Onset   • Hypertension Father         pulmonary       Allergies   Allergen Reactions   • Morphine And Related Hallucinations   • Diamox [Acetazolamide] Rash       Current Outpatient Prescriptions   Medication Sig Dispense Refill   • ACCU-CHEK JACOB PLUS test strip USE BID  3   • ACCU-CHEK SOFTCLIX LANCETS lancets      • ARIPiprazole (ABILIFY) 5 MG tablet Take 5 mg by mouth Daily.     • bevacizumab (AVASTIN) 100 MG/4ML chemo injection Infuse  into a venous catheter.     • clonazePAM (KlonoPIN) 1 MG tablet Take 1 mg by mouth 2 (Two) Times a Day As Needed.     • cyclobenzaprine (FLEXERIL) 5 MG tablet TK 1 T PO  Q 6 H PRF MUSCLE SPASMS  0   • Dulaglutide (TRULICITY) 0.75 MG/0.5ML solution pen-injector Inject  under the skin.     • gabapentin (NEURONTIN) 800 MG tablet Take 800 mg by mouth 3 (Three) Times a Day.     • HYDROcodone-acetaminophen (NORCO) 7.5-325 MG per tablet Take 1 tablet by mouth Every 6 (Six) Hours As Needed for Moderate Pain  (Pain). 25 tablet 0   • ibuprofen (ADVIL,MOTRIN) 800 MG tablet Take 800 mg by mouth Every 8 (Eight) Hours As Needed for Mild Pain .     •  lidocaine-prilocaine (EMLA) 2.5-2.5 % cream Apply 1 hour prior to VAD access (Patient taking differently: 1 application As Needed (before port access for chemo). Apply 1 hour prior to VAD access) 30 g 5   • lisinopril-hydrochlorothiazide (PRINZIDE,ZESTORETIC) 20-12.5 MG per tablet Take 1 tablet by mouth Daily.     • ondansetron (ZOFRAN) 8 MG tablet Take 1 tablet by mouth Every 8 (Eight) Hours As Needed for Nausea or Vomiting. 30 tablet 5   • prochlorperazine (COMPAZINE) 10 MG tablet Take 1 tablet by mouth Every 6 (Six) Hours As Needed for Nausea or Vomiting. 60 tablet 3   • ranitidine (ZANTAC) 150 MG capsule Take 150 mg by mouth 2 (Two) Times a Day.     • sertraline (ZOLOFT) 100 MG tablet Take 150 mg by mouth Daily.     • SITagliptin (JANUVIA) 100 MG tablet Take 100 mg by mouth Daily.     • valACYclovir (VALTREX) 500 MG tablet Take 1,000 mg by mouth As Needed (shingles).     • zolpidem (AMBIEN) 5 MG tablet Take 5 mg by mouth Every Night.     • diphenoxylate-atropine (LOMOTIL) 2.5-0.025 MG per tablet Take 1 tablet by mouth 4 (Four) Times a Day As Needed for Diarrhea. 30 tablet 3     No current facility-administered medications for this visit.      Facility-Administered Medications Ordered in Other Visits   Medication Dose Route Frequency Provider Last Rate Last Dose   • atropine injection 0.25 mg  0.25 mg Intravenous Q15 Min PRN JEFFY Pinto MD       • bevacizumab (AVASTIN) 1,100 mg in sodium chloride 0.9 % 144 mL chemo IVPB  1,100 mg Intravenous Once JEFFY Pinto MD       • dexamethasone sodium phosphate 12 mg in sodium chloride 0.9 % IVPB  12 mg Intravenous Once JFEFY Pinto MD       • irinotecan (CAMPTOSAR) 260 mg in dextrose (D5W) 5 % 513 mL chemo IVPB  260 mg Intravenous Once JEFFY Pinto MD       • mupirocin (BACTROBAN) 2 % nasal ointment   Nasal BID Ирина Interiano PA-C       • palonosetron (ALOXI) injection 0.25 mg  0.25 mg Intravenous Once JEFFY Pinto MD       • sodium chloride 0.9  % infusion 250 mL  250 mL Intravenous Once JEFFY Pinto MD         REVIEW OF SYSTEMS  CONSTITUTIONAL:  No fever, chills or night sweats. Intermittent fatigue.  EYES:  No blurry vision, diplopia or other vision changes.  ENT:  No hearing loss, nosebleeds or sore throat. Chronic sinus congestion/allergies.  CARDIOVASCULAR:  No palpitations, arrhythmia, syncopal episodes or edema.  PULMONARY:  No hemoptysis, wheezing, chronic cough or shortness of breath.  GASTROINTESTINAL: Diarrhea with new treatment as above. No nausea/vomiting. No abdominal pain.   GENITOURINARY:  No hematuria, kidney stones or frequent urination.  MUSCULOSKELETAL:  Chronic back pains, improved status post stimulator implantation. Diffuse joint/bone aches.  INTEGUMENTARY: No rashes or pruritus.  ENDOCRINE:  No excessive thirst or hot flashes.  HEMATOLOGIC:  No history of free bleeding, spontaneous bleeding or clotting.  IMMUNOLOGIC:  No allergies or frequent infections.  NEUROLOGIC: Chronic nerve pain in the lower back, now much improved status post epidural spinal cord stimulator in early January 2018. Intermittent headaches  PSYCHIATRIC:  No anxiety or depression.    PHYSICAL EXAMINATION  /79   Pulse 80   Temp 97.7 °F (36.5 °C) (Oral)   Resp 18   Wt 111 kg (244 lb)   SpO2 98%   BMI 44.62 kg/m²   GENERAL:  A well-developed, well-nourished, obese, white female in no acute distress.  HEENT:  Pupils equally round and reactive to light.  Extraocular muscles intact.  CARDIOVASCULAR:  Regular rate and rhythm.  No murmurs, gallops or rubs.  LUNGS:  Clear to auscultation bilaterally.  ABDOMEN:  Soft, nontender, nondistended with positive bowel sounds.  EXTREMITIES:  No clubbing, cyanosis or edema bilaterally.  SKIN:  No rashes or petechiae.  NEURO:  Cranial nerves grossly intact.  No focal deficits.  PSYCH:  Alert and oriented x3.    LABORATORY    Lab Results   Component Value Date    WBC 7.04 09/12/2018    HGB 15.5 09/12/2018    HCT 45.6  "09/12/2018    MCV 82.5 09/12/2018     09/12/2018    NEUTROABS 4.06 09/12/2018       Lab Results   Component Value Date     09/12/2018    K 4.3 09/12/2018     09/12/2018    CO2 24.5 09/12/2018    BUN 13 09/12/2018    CREATININE 0.90 09/12/2018    GLUCOSE 228 (H) 09/12/2018    CALCIUM 9.0 09/12/2018    AST 33 (H) 09/12/2018    ALT 59 (H) 09/12/2018    ALKPHOS 62 09/12/2018    BILITOT 0.6 09/12/2018    PROTEINTOT 6.9 09/12/2018    ALBUMIN 4.00 09/12/2018     IMAGING  MRI brain with and without contrast (06/13/2016):  Impression: There are bilateral frontal changes related to prior surgery, right greater than left. There is some associated gliosis and minimal contrast enhancement in the superior leftward aspect of the surgical \"bed\". Most importantly, there has been no change since 02/22/2016.    MRI cyberknife brain with contrast (12/28/2016):  Impression: Areas of increased blood flow in the right frontal lobe in areas of abnormal contrast enhancement and therefore the abnormality seen on prior MRI is highly suspicious for tumor.    MRI brain with and without contrast (02/28/2017):  Impression: Slight interval increase in the size of the area of abnormal contrast enhancement diffusely throughout the right frontal region. The amount of surrounding edema is also increased in the interval. Findings may be related to progression of disease however postradiation changes cannot be excluded. The edema extends into the right basal ganglia and parietal lobe. No new area of abnormal contrast enhancement identified. [Per a neurosurgery read and evaluation that day, the intensity of the enhancement is somewhat lessened, and the flare appears to be about the same.]    MRI brain with and without contrast (04/27/2017):  Impression: Increased cerebral blood volume in the area of abnormal contrast enhancement most consistent with neovascularity and tumor progression. The size and surrounding edema is stable.    MRI " brain with and without contrast (05/30/2017):  Impression:  1) Intense, enhancing lesion right frontal lobe with interval evidence of increasing mass effect and edema with increasing midline shift from right to left and increasing compression of the anterior horn right lateral ventricle. The size of the enhancing lesion is very slightly larger by careful comparison and measurement. The degree of overall edema in the right frontal lobe has increased and now crosses the midline to the left frontal lobe. There is midline shift right to left which has increased somewhat since previous studies.  2) Therefore, the findings in the right frontal lobe are slowly progressive by multiple parameters. A distant lesion is not identified elsewhere.    CT cerebral perfusion with and without contrast (05/30/2017):  Impression:  1) Large mass lesion right frontal lobe exhibiting marked increase in permeability. There is mass effect and edema diffusely in the right frontal lobe with a high grade defect of cerebral blood flow with sporadic areas of preserved cerebral blood volume.  2) There is marked delay in the mean transit time and time to drain in the right frontal tumor and mass region.  3) No other cerebral insults, ischemic abnormalities or parametric map abnormalities are identified elsewhere, outside of the large right frontal mass.    MRI brain with and without contrast (09/19/2017):  Impression:  1) Continued, interval decrease in right frontal lobe postoperative findings with overall decreased vasogenic edema and hemorrhage from prior. No acute hemorrhage or enhancing soft tissue focus to suggest residual enhancing tumor. No evidence for distant, enhancing lesion.  2) No acute intracranial pathology.    MRI brain with and without contrast (11/14/17)  Impression:  1. Complex septated cystic mass with perimeter enhancement right frontal  region surrounded by gliotic increased FLAIR and T2 signal radiating  into the  contralateral left frontal lobe and surrounding the mass in the  right frontal lobe. 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  2. The size of the right frontal cystic lesion, the degree of perimeter  enhancement and the overall degree of abnormal FLAIR and T2 signal in  the frontal lobes is stable without change or progression when compared  to previous studies of 09/19/2017.  3. Further, new or enhancing lesion elsewhere is not currently  identified. Therefore, the findings in the brain, more specifically in  the frontal lobes, are stable when compared to the most recent studies.    MRI brain with and without contrast (04/23/2018):  Impression: Stable appearance of cystic collection right frontal lobe with surrounding vasogenic edema and/or nonenhancing tumor. Peripheral enhancement is persistent; however, no new enhancement, progression or enhancing nodularity is identified. No abnormal enhancement otherwise noted. No developing hydrocephalus.    MRI brain with and without contrast (08/07/2018):  Impression: There has been unequivocal progression of disease when compared with the previous examination of 04/23/2018. Specifically, there is bifrontal cystic change which is stable. However, there is abnormal contrast enhancement posterior to the cystic change on the right involving the frontal lobe as well as the caudate lobe and lentiform nucleus. There is also significantly greater vasogenic edema involving the new abnormalities.    IMPRESSION AND PLAN  Ms. Alvarez is a 43 y.o., white female with:  1. Glioma: Initially diagnosed in 2007, with recurrences in 2014, late 2016, and, most recently, summer 2017. Now status post resection x 3 (in 2007, 2014 and 06/21/2017) and localized radiation x 2 (adjuvantly in 2007; and, most recently, a Cyberknife boost delivered in January 2017). Since a third resection was felt to not be in her best interest (at least at the time, in early 2017) and she  completed localized XRT, we agreed with neurosurgery's recommendation to start chemotherapy. She began Temodar (patient dose: 320 mg daily on days 1-5 of a 28 day cycle) by late January 2017 and completed five (5), qmonthly cycles. She tolerated this regimen overall well, with some mild, and manageable nausea and increasing fatigue her only noticeable side effects. With this treatment, her CBCs remained unremarkable with no developing cytopenias. Unfortunately, despite this therapy, she developed reworsening headaches; and a repeat MRI in late May 2017 was consistent with reprogressive disease. As a result, she underwent a repeat craniotomy on 06/21/17. She tolerated this debulking surgery well, and she was subsequently started on second-line, palliative therapy with [id number] Avastin. She began this treatment on 08/16/2017, completed twenty-one (21) cycles to date (due to her needing to undergo placement of an epidural spinal cord stimulator to treat her worsening, chronic back pain, a delay of about six weeks was required in between her eighth and ninth cycles). While this treatment plan had been working well for a total of nearly one year now, the most recent repeat MRI of the brain (performed on 08/07/2018 and summarized above) unfortunately showed definite disease progression. Her neurosurgeon is currently in the process of conferring with [facility] to weigh her next-line palliative treatment options. In the meantime, by default, she would be a good candidate for a trial of adding chemotherapy (irinotecan) and continuing the bevacizumab and she was agreeable to proceed. We began this new [id number] regimen and she has completed one (1) cycle to date. Overall, she tolerated this well with diarrhea (which was mild/tolerable) as her only noticeable side effect. Will proceed with cycle 2 today. She will follow up with neurosurgery with a repeat MRI again on 10/2/18. We will see her back in our clinic (on the day  of the fourth cycle of irinotecan and bevacizumab) for a symptom/toxicity check and results of repeat MRI to discuss further management.   2. Chronic back pain: As discussed above, her bevacizumab had to be held for about six weeks in late 2017/early 2018 to allow placement of an epidural spinal cord stimulator. This was successfully done on 01/08/2018, to good, ongoing, palliative effect. Continue to follow up with neurosurgery, as previously planned.  3. Headaches: A chronic issue and recently mildly worsening due to the reprogression of issue #1. She states that her symptoms currently remain manageable with prn Motrin, and she does not need something stronger (such as Norco) at this time. Continue to monitor.  4. Chronic sinusitis/allergies: Ongoing management per primary care.   5. Diarrhea: Continue imodium prn. Will also give Rx for Lomotil prn.     The patient was in agreement with these plans.    ACO Quality Measures:  a) The patient does not use (and has never used) tobacco products.  b) The patient's BMI is above normal parameters. Plan includes a referral to primary care.  c) The current outpatient and discharge medications have been reconciled for the patient.    It is a pleasure to participate in Ms. Devlin's care. Please do not hesitate to call with any questions or concerns that you may have.    A total of 25 minutes were spent coordinating this patient’s care in clinic today; more than 50% of this time was spent face-to-face with the patient, reviewing her interim medical history and counseling on the current evaluation, treatment and followup plan. All questions were answered to her satisfaction.    FOLLOW UP  Continue [id number] irinotecan plus bevacizumab and proceed with cycle 2 today. Rx for Lomotil provided today. With neurosurgery as previously planned with repeat MRI of brain on 10/2/18. Return to our clinic (on day #1 of cycle #4 of irinotecan/bevacizumab) with a CBC and CMP for symptom/toxicity  check.       This document was electronically signed by MCKENZIE Parson September 12, 2018 11:31 AM      CC: MD Cirilo Becerra MD Marta Hayne, MD Beverly Paige Neace, PA

## 2018-09-26 NOTE — PROGRESS NOTES
SS spoke with pt this date.  Pt request catalog for Definition 6.  SS provided pt with [employer] phone number to call for wig voucher.  Pt states that she has a scan in three weeks and then another scan in eight weeks after that.  Pt states that after the second scan if she isn't have a good result with chemotherapy that she plans to stop treatment.  Pt states that she and spouse have made plans for their daughter to live with spouse's sister (daughter's aunt) in the event that something happens to her.  She states that she can't keep on taking the chemotherapy if it isn't helping and having all of the side effects.  SS will follow up with pt at appointments for support.  Pt states that she is already writing letters for her daughter and trying to prepare her daughter for the future without her.  So sad.... SS will follow.

## 2018-10-02 NOTE — PROGRESS NOTES
"Marcelina Alvarez  1975  [id number]                        CHIEF COMPLAINT:  [ Right frontal glioma]         MEDICAL HISTORY SINCE LAST ENCOUNTER:  [She reports for follow-up with change in her chemotherapy protocol from her last encounter of 8/7/2018.  She states from a neurosurgical and neurological perspective she is quite stable.  The issues with her chemotherapy her that her diarrhea and hair loss. ]           Past Medical History:   Diagnosis Date   • Anxiety and depression    • Arthritis     RIGHT FOOT \"DUE TO FOUR STRESS FRACTURES\"    • Brain tumor (CMS/HCC) 2010,2014,2017    SURGERY x3, CHEMO AND RADIATON    • Diabetes mellitus (CMS/HCC)     DX'D TYPE II NIDDM APPROX 6 WEEKS AGO, \"CAUSED BY CHEMO\" CHECKS BS -150 IN AM -275 PM    • Eczema    • Frequent headaches    • GERD (gastroesophageal reflux disease)    • History of shingles     MULTIPLE OUTBREAKS--NO OPEN AREAS AT THIS TIME \"SHOW UP JUST ABOVE BUTTOCKS\"    • Hypertension     CONTROLLED WITH MEDS PER PT    • IBS (irritable bowel syndrome)    • Low back pain    • Nerve damage     from shingles   • PONV (postoperative nausea and vomiting)    • Prophylactic chemotherapy     receives chemotherapy regularly due to brain tumor recurrence    • Sinus disease    • Wears reading eyeglasses               Past Surgical History:   Procedure Laterality Date   • BREAST SURGERY      REDUCTION    • CHOLECYSTECTOMY     • CRANIOTOMY FOR TUMOR Right 6/21/2017    Procedure: CRANIOTOMY FOR TUMOR STEREOTACTIC WITH STEALTH; 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 Gregorio)   • CRANIOTOMY FOR TUMOR  08/31/2007    CRANI & EXCISION OF LOW GRADE GLIOMA & LUMBAR DRAIN (Dr. Gregorio)   • ENDOMETRIAL ABLATION     • PORTACATH PLACEMENT     • SPINAL CORD STIMULATOR IMPLANT N/A 1/8/2018    Procedure: SPINAL CORD STIMULATOR " INSERTION PHASE;  Surgeon: Cirilo Lemons MD;  Location: [facility];  Service:               Family History   Problem Relation Age of Onset   • Hypertension Father         pulmonary              Social History     Social History   • Marital status:      Spouse name: N/A   • Number of children: N/A   • Years of education: N/A     Occupational History   • Not on file.     Social History Main Topics   • Smoking status: Never Smoker   • Smokeless tobacco: Never Used   • Alcohol use No   • Drug use: No   • Sexual activity: Defer     Other Topics Concern   • Not on file     Social History Narrative   • No narrative on file              Allergies   Allergen Reactions   • Morphine And Related Hallucinations   • Diamox [Acetazolamide] Rash              Current Outpatient Prescriptions:   •  ACCU-CHEK JACOB PLUS test strip, USE BID, Disp: , Rfl: 3  •  ACCU-CHEK SOFTCLIX LANCETS lancets, , Disp: , Rfl:   •  ARIPiprazole (ABILIFY) 5 MG tablet, Take 5 mg by mouth Daily., Disp: , Rfl:   •  bevacizumab (AVASTIN) 100 MG/4ML chemo injection, Infuse  into a venous catheter., Disp: , Rfl:   •  diphenoxylate-atropine (LOMOTIL) 2.5-0.025 MG per tablet, Take 1 tablet by mouth 4 (Four) Times a Day As Needed for Diarrhea., Disp: 30 tablet, Rfl: 3  •  Dulaglutide (TRULICITY) 0.75 MG/0.5ML solution pen-injector, Inject  under the skin., Disp: , Rfl:   •  fluconazole (DIFLUCAN) 200 MG tablet, , Disp: , Rfl:   •  gabapentin (NEURONTIN) 800 MG tablet, Take 800 mg by mouth 3 (Three) Times a Day., Disp: , Rfl:   •  ibuprofen (ADVIL,MOTRIN) 800 MG tablet, Take 800 mg by mouth Every 8 (Eight) Hours As Needed for Mild Pain ., Disp: , Rfl:   •  lidocaine-prilocaine (EMLA) 2.5-2.5 % cream, Apply 1 hour prior to VAD access (Patient taking differently: 1 application As Needed (before port access for chemo). Apply 1 hour prior to VAD access), Disp: 30 g, Rfl: 5  •  lisinopril-hydrochlorothiazide (PRINZIDE,ZESTORETIC) 20-12.5 MG per tablet, Take 1  tablet by mouth Daily., Disp: , Rfl:   •  NYSTATIN [id number] UNIT/GM powder, , Disp: , Rfl:   •  ondansetron (ZOFRAN) 8 MG tablet, Take 1 tablet by mouth Every 8 (Eight) Hours As Needed for Nausea or Vomiting., Disp: 30 tablet, Rfl: 5  •  prochlorperazine (COMPAZINE) 10 MG tablet, Take 1 tablet by mouth Every 6 (Six) Hours As Needed for Nausea or Vomiting., Disp: 60 tablet, Rfl: 3  •  raNITIdine (ZANTAC) 300 MG tablet, TK 1 T PO BID, Disp: , Rfl: 5  •  sertraline (ZOLOFT) 100 MG tablet, Take 150 mg by mouth Daily., Disp: , Rfl:   •  SITagliptin (JANUVIA) 100 MG tablet, Take 100 mg by mouth Daily., Disp: , Rfl:   •  valACYclovir (VALTREX) 500 MG tablet, Take 1,000 mg by mouth As Needed (shingles)., Disp: , Rfl:   •  zolpidem (AMBIEN) 5 MG tablet, Take 5 mg by mouth Every Night., Disp: , Rfl:   No current facility-administered medications for this visit.     Facility-Administered Medications Ordered in Other Visits:   •  mupirocin (BACTROBAN) 2 % nasal ointment, , Nasal, BID, Ирина Interiano, KIMBERLY         Review of Systems   Constitutional: Negative for activity change, appetite change, chills, diaphoresis, fatigue, fever and unexpected weight change.   HENT: Positive for postnasal drip, rhinorrhea, sinus pressure and sneezing. Negative for congestion, dental problem, drooling, ear discharge, ear pain, facial swelling, hearing loss, mouth sores, nosebleeds, sinus pain, sore throat, tinnitus, trouble swallowing and voice change.    Eyes: Negative for photophobia, pain, discharge, redness, itching and visual disturbance.   Respiratory: Positive for shortness of breath. Negative for apnea, cough, choking, chest tightness, wheezing and stridor.    Cardiovascular: Negative for chest pain, palpitations and leg swelling.   Gastrointestinal: Negative for abdominal distention, abdominal pain, anal bleeding, blood in stool, constipation, diarrhea, nausea, rectal pain and vomiting.   Musculoskeletal: Negative for arthralgias, back  "pain, gait problem, joint swelling, myalgias, neck pain and neck stiffness.   Skin: Negative for color change, pallor, rash and wound.   Allergic/Immunologic: Negative for environmental allergies, food allergies and immunocompromised state.   Neurological: Positive for tremors, weakness and headaches. Negative for dizziness, seizures, syncope, facial asymmetry, speech difficulty, light-headedness and numbness.   Hematological: Negative for adenopathy. Does not bruise/bleed easily.   Psychiatric/Behavioral: Negative for agitation, behavioral problems, confusion, decreased concentration, dysphoric mood, self-injury, sleep disturbance and suicidal ideas. The patient is not nervous/anxious and is not hyperactive.                Vitals:    10/02/18 1219   BP: 122/100   Temp: 97.6 °F (36.4 °C)   Weight: 111 kg (244 lb)   Height: 157.5 cm (62\")               EXAMINATION: There is no papilledema.  She has sort of a flattened affect which she has always had to some degree.  She has no focal weakness or sensory changes however.            MEDICAL DECISION MAKING: I reviewed her MRI in detail and have compared to the last 3 that she's had.  With particular attention to the flare sequence it appears to be relatively stable as compared to the one 8 weeks ago.  The changes that she has are subtle and are primarily in the enhanced area.  The leading age and infiltrative edge seems to be unchanged.  For that reason I think is reasonable to continue her chemotherapy for at least another 8 weeks.  At that time we will have a better idea as to its effectiveness.  Obviously this tumor will progress however I would like to provide her sufficient time to make arrangements for her daughter, etc.           ASSESSMENT/DISPOSITION: The MRI appears to be relatively stable with no overt and significant progression.  I have suggested that she continue the chemotherapy if it is tolerable.  She would like to do so at least for another cycle " before discontinuing treatment.  Certainly think that's reasonable in this young girl.  I'll see her in 8 weeks.              I APPRECIATE THE OPPORTUNITY OF THIS REFERRAL. PLEASE CALL IF ANY       QUESTIONS [phone]    Scribed for David Gregorio MD by Connie Sanders CMA. 10/2/2018  12:42 PM     I have read and concur with the information provided by the scribe.  David Gregorio MD

## 2018-10-24 NOTE — PROGRESS NOTES
Name:  Marcelina Alvarez  :  1975  Date:  10/24/2018     REFERRING PHYSICIAN  David Gregorio MD    PRIMARY CARE PROVIDER  Sarah Amado PA    REASON FOR FOLLOWUP  1. Glioma (CMS/HCC)      CHIEF COMPLAINT  Chronic fatigue, worsened since Irinotecan was added to her [id number] Avastin.    Dear Dr. Gregorio,    HISTORY OF PRESENT ILLNESS:   I saw Ms. Alvarez in follow up today in our medical oncology clinic. 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 She was initially diagnosed in , and you performed a successful resection. She subsequently underwent localized XRT (but no chemotherapy). She did very well until , when she developed a localized recurrence; and, again, you performed a successful resection (this time without adjuvant XRT). She was again doing well until late , when she started to develop some mild forgetfulness and headaches; and a repeat MRI showed evidence of recurrent disease again. This time, it was felt that repeat surgery was not in her best interest (at least at that time). She underwent Cyberknife treatment in early 2017 and tolerated it well. She was subsequently referred to our clinic to consider chemotherapy (Temodar). At the time of her initial appointment with us (on 2017), she was agreeable to this treatment (patient dose: 320 mg on days 1-5 of a 28-day cycle). She did overall well on this regimen for several months; however, unfortunately, repeat imaging in May 2017 showed evidence of disease reprogression. She underwent a repeat craniotomy for debulking in early summer 2017 and has been doing overall well since then on second-line palliative therapy with [id number] Avastin (plus Irinotecan beginning in late 2018).    INTERIM HISTORY:  Ms. Alvarez returns to clinic today for follow up by herself. Due to disease progression, Irinotecan was added to her  "ongoing, w9wxlneg bevacizumab in late August 2018. She has received four (4) cycles of this new combination to date. While some increased nausea remains manageable with prn antiemetics, profoundly increased fatigue has been her primary problem with this new regimen (if it was not for the fatigue, she believes that she would still overall be doing very well). She does continue to have frequent (but stable) headaches that remain manageable with prn Motrin. She otherwise has no new or other specific complaints.    Past Medical History:   Diagnosis Date   • Anxiety and depression    • Arthritis     RIGHT FOOT \"DUE TO FOUR STRESS FRACTURES\"    • Brain tumor (CMS/HCC) 2010,2014,2017    SURGERY x3, CHEMO AND RADIATON    • Diabetes mellitus (CMS/HCC)     DX'D TYPE II NIDDM APPROX 6 WEEKS AGO, \"CAUSED BY CHEMO\" CHECKS BS -150 IN AM -275 PM    • Eczema    • Frequent headaches    • GERD (gastroesophageal reflux disease)    • History of shingles     MULTIPLE OUTBREAKS--NO OPEN AREAS AT THIS TIME \"SHOW UP JUST ABOVE BUTTOCKS\"    • Hypertension     CONTROLLED WITH MEDS PER PT    • IBS (irritable bowel syndrome)    • Low back pain    • Nerve damage     from shingles   • PONV (postoperative nausea and vomiting)    • Prophylactic chemotherapy     receives chemotherapy regularly due to brain tumor recurrence    • Sinus disease    • Wears reading eyeglasses        Past Surgical History:   Procedure Laterality Date   • BREAST SURGERY      REDUCTION    • CHOLECYSTECTOMY     • CRANIOTOMY FOR TUMOR Right 6/21/2017    Procedure: CRANIOTOMY FOR TUMOR STEREOTACTIC WITH STEALTH; planned right frontal lobectomy for astrocytoma progression;  Surgeon: David Gregorio MD;  [facility];  Service:    • CRANIOTOMY FOR TUMOR  12/05/2014    CRANI FOR GLIOMA (Dr. Gregorio)   • CRANIOTOMY FOR TUMOR  08/31/2007    CRANI & EXCISION OF LOW GRADE GLIOMA & LUMBAR DRAIN (Dr. Gregorio)   • ENDOMETRIAL ABLATION     • PORTACATH PLACEMENT   "   • SPINAL CORD STIMULATOR IMPLANT N/A 1/8/2018    Procedure: SPINAL CORD STIMULATOR INSERTION PHASE;  Surgeon: Cirilo Lemons MD;  Location: [facility];  Service:        Social History     Social History   • Marital status:      Spouse name: N/A   • Number of children: N/A   • Years of education: N/A     Occupational History   • Not on file.     Social History Main Topics   • Smoking status: Never Smoker   • Smokeless tobacco: Never Used   • Alcohol use No   • Drug use: No   • Sexual activity: Defer     Other Topics Concern   • Not on file     Social History Narrative   • No narrative on file       Family History   Problem Relation Age of Onset   • Hypertension Father         pulmonary       Allergies   Allergen Reactions   • Morphine And Related Hallucinations   • Diamox [Acetazolamide] Rash       Current Outpatient Prescriptions   Medication Sig Dispense Refill   • ACCU-CHEK JACOB PLUS test strip USE BID  3   • ACCU-CHEK SOFTCLIX LANCETS lancets      • ARIPiprazole (ABILIFY) 5 MG tablet Take 5 mg by mouth Daily.     • bevacizumab (AVASTIN) 100 MG/4ML chemo injection Infuse  into a venous catheter.     • diphenoxylate-atropine (LOMOTIL) 2.5-0.025 MG per tablet Take 1 tablet by mouth 4 (Four) Times a Day As Needed for Diarrhea. 30 tablet 3   • Dulaglutide (TRULICITY) 0.75 MG/0.5ML solution pen-injector Inject  under the skin.     • fluconazole (DIFLUCAN) 200 MG tablet      • gabapentin (NEURONTIN) 800 MG tablet Take 800 mg by mouth 3 (Three) Times a Day.     • ibuprofen (ADVIL,MOTRIN) 800 MG tablet Take 800 mg by mouth Every 8 (Eight) Hours As Needed for Mild Pain .     • lidocaine-prilocaine (EMLA) 2.5-2.5 % cream Apply 1 hour prior to VAD access (Patient taking differently: 1 application As Needed (before port access for chemo). Apply 1 hour prior to VAD access) 30 g 5   • lisinopril-hydrochlorothiazide (PRINZIDE,ZESTORETIC) 20-12.5 MG per tablet Take 1 tablet by mouth Daily.     • NYSTATIN [id number] UNIT/GM  powder      • ondansetron (ZOFRAN) 8 MG tablet Take 1 tablet by mouth Every 8 (Eight) Hours As Needed for Nausea or Vomiting. 30 tablet 5   • prochlorperazine (COMPAZINE) 10 MG tablet Take 1 tablet by mouth Every 6 (Six) Hours As Needed for Nausea or Vomiting. 60 tablet 3   • raNITIdine (ZANTAC) 300 MG tablet TK 1 T PO BID  5   • sertraline (ZOLOFT) 100 MG tablet Take 150 mg by mouth Daily.     • SITagliptin (JANUVIA) 100 MG tablet Take 100 mg by mouth Daily.     • valACYclovir (VALTREX) 500 MG tablet Take 1,000 mg by mouth As Needed (shingles).     • zolpidem (AMBIEN) 5 MG tablet Take 5 mg by mouth Every Night.       No current facility-administered medications for this visit.      Facility-Administered Medications Ordered in Other Visits   Medication Dose Route Frequency Provider Last Rate Last Dose   • atropine injection 0.25 mg  0.25 mg Intravenous Q15 Min PRN JEFFY Pinto MD       • bevacizumab (AVASTIN) 1,080 mg in sodium chloride 0.9 % 143.2 mL chemo IVPB  1,080 mg Intravenous Once JEFFY Pinto MD       • dexamethasone sodium phosphate 12 mg in sodium chloride 0.9 % IVPB  12 mg Intravenous Once JEFFY Pinto  mL/hr at 10/24/18 1123 12 mg at 10/24/18 1123   • heparin flush (porcine) 100 UNIT/ML injection 500 Units  500 Units Intravenous PRN JEFFY Pinto MD       • irinotecan (CAMPTOSAR) 260 mg in dextrose (D5W) 5 % 513 mL chemo IVPB  260 mg Intravenous Once JEFFY Pinto MD       • mupirocin (BACTROBAN) 2 % nasal ointment   Nasal BID Ирина Interiano PA-C         REVIEW OF SYSTEMS  CONSTITUTIONAL:  No fever, chills or night sweats. Worsening fatigue since Irinotecan was added to her ongoing, [id number] bevacizumab, as per the HPI above.  EYES:  No blurry vision, diplopia or other vision changes.  ENT:  No hearing loss, nosebleeds or sore throat. Chronic sinus congestion/allergies.  CARDIOVASCULAR:  No palpitations, arrhythmia, syncopal episodes or edema.  PULMONARY:  No hemoptysis,  wheezing, chronic cough or shortness of breath.  GASTROINTESTINAL: Intermittent, manageable nausea. Intermittent, manageable diarrhea. No abdominal pain.   GENITOURINARY:  No hematuria, kidney stones or frequent urination.  MUSCULOSKELETAL:  Chronic back pains, improved status post stimulator implantation. Diffuse joint/bone aches, mild and stable.  INTEGUMENTARY: No rashes or pruritus.  ENDOCRINE:  No excessive thirst or hot flashes.  HEMATOLOGIC:  No history of free bleeding, spontaneous bleeding or clotting.  IMMUNOLOGIC:  No allergies or frequent infections.  NEUROLOGIC: Chronic nerve pain in the lower back, now still much improved status post epidural spinal cord stimulator in early January 2018. Intermittent headaches, stable, as per the HPI above.  PSYCHIATRIC:  No anxiety or depression.    PHYSICAL EXAMINATION  /89   Pulse 74   Temp 98.2 °F (36.8 °C) (Oral)   Resp 18   Wt 108 kg (237 lb 12.8 oz)   SpO2 97%   BMI 43.49 kg/m²     GENERAL:  A well-developed, well-nourished, obese, white female in no acute distress.  HEENT:  Pupils equally round and reactive to light.  Extraocular muscles intact.  CARDIOVASCULAR:  Regular rate and rhythm.  No murmurs, gallops or rubs.  LUNGS:  Clear to auscultation bilaterally.  ABDOMEN:  Soft, nontender, nondistended with positive bowel sounds.  EXTREMITIES:  No clubbing, cyanosis or edema bilaterally.  SKIN:  No rashes or petechiae.  NEURO:  Cranial nerves grossly intact.  No focal deficits.  PSYCH:  Alert and oriented x3.    LABORATORY    Lab Results   Component Value Date    WBC 7.57 10/24/2018    HGB 14.9 10/24/2018    HCT 44.3 10/24/2018    MCV 83.1 10/24/2018     10/24/2018    NEUTROABS 4.70 10/24/2018       Lab Results   Component Value Date     10/24/2018    K 4.1 10/24/2018     10/24/2018    CO2 23.9 (L) 10/24/2018    BUN 14 10/24/2018    CREATININE 0.80 10/24/2018    GLUCOSE 157 (H) 10/24/2018    CALCIUM 9.2 10/24/2018    AST 38 (H)  "10/24/2018    ALT 60 (H) 10/24/2018    ALKPHOS 57 10/24/2018    BILITOT 0.6 10/24/2018    PROTEINTOT 6.9 10/24/2018    ALBUMIN 4.10 10/24/2018     IMAGING  MRI brain with and without contrast (06/13/2016):  Impression: There are bilateral frontal changes related to prior surgery, right greater than left. There is some associated gliosis and minimal contrast enhancement in the superior leftward aspect of the surgical \"bed\". Most importantly, there has been no change since 02/22/2016.    MRI cyberknife brain with contrast (12/28/2016):  Impression: Areas of increased blood flow in the right frontal lobe in areas of abnormal contrast enhancement and therefore the abnormality seen on prior MRI is highly suspicious for tumor.    MRI brain with and without contrast (02/28/2017):  Impression: Slight interval increase in the size of the area of abnormal contrast enhancement diffusely throughout the right frontal region. The amount of surrounding edema is also increased in the interval. Findings may be related to progression of disease however postradiation changes cannot be excluded. The edema extends into the right basal ganglia and parietal lobe. No new area of abnormal contrast enhancement identified. [Per a neurosurgery read and evaluation that day, the intensity of the enhancement is somewhat lessened, and the flare appears to be about the same.]    MRI brain with and without contrast (04/27/2017):  Impression: Increased cerebral blood volume in the area of abnormal contrast enhancement most consistent with neovascularity and tumor progression. The size and surrounding edema is stable.    MRI brain with and without contrast (05/30/2017):  Impression:  1) Intense, enhancing lesion right frontal lobe with interval evidence of increasing mass effect and edema with increasing midline shift from right to left and increasing compression of the anterior horn right lateral ventricle. The size of the enhancing lesion is very " slightly larger by careful comparison and measurement. The degree of overall edema in the right frontal lobe has increased and now crosses the midline to the left frontal lobe. There is midline shift right to left which has increased somewhat since previous studies.  2) Therefore, the findings in the right frontal lobe are slowly progressive by multiple parameters. A distant lesion is not identified elsewhere.    CT cerebral perfusion with and without contrast (05/30/2017):  Impression:  1) Large mass lesion right frontal lobe exhibiting marked increase in permeability. There is mass effect and edema diffusely in the right frontal lobe with a high grade defect of cerebral blood flow with sporadic areas of preserved cerebral blood volume.  2) There is marked delay in the mean transit time and time to drain in the right frontal tumor and mass region.  3) No other cerebral insults, ischemic abnormalities or parametric map abnormalities are identified elsewhere, outside of the large right frontal mass.    MRI brain with and without contrast (09/19/2017):  Impression:  1) Continued, interval decrease in right frontal lobe postoperative findings with overall decreased vasogenic edema and hemorrhage from prior. No acute hemorrhage or enhancing soft tissue focus to suggest residual enhancing tumor. No evidence for distant, enhancing lesion.  2) No acute intracranial pathology.    MRI brain with and without contrast (11/14/17)  Impression:  1. Complex septated cystic mass with perimeter enhancement right frontal  region surrounded by gliotic increased FLAIR and T2 signal radiating  into the contralateral left frontal lobe and surrounding the mass in the  right frontal lobe. 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  2. The size of the right frontal cystic lesion, the degree of perimeter  enhancement and the overall degree of abnormal FLAIR and T2 signal in  the frontal lobes is stable without change  "or progression when compared  to previous studies of 09/19/2017.  3. Further, new or enhancing lesion elsewhere is not currently  identified. Therefore, the findings in the brain, more specifically in  the frontal lobes, are stable when compared to the most recent studies.    MRI brain with and without contrast (04/23/2018):  Impression: Stable appearance of cystic collection right frontal lobe with surrounding vasogenic edema and/or nonenhancing tumor. Peripheral enhancement is persistent; however, no new enhancement, progression or enhancing nodularity is identified. No abnormal enhancement otherwise noted. No developing hydrocephalus.    MRI brain with and without contrast (08/07/2018):  Impression: There has been unequivocal progression of disease when compared with the previous examination of 04/23/2018. Specifically, there is bifrontal cystic change which is stable. However, there is abnormal contrast enhancement posterior to the cystic change on the right involving the frontal lobe as well as the caudate lobe and lentiform nucleus. There is also significantly greater vasogenic edema involving the new abnormalities.    MRI brain with and without contrast (10/02/2018):  Impression: Increasing masslike enhancement, localized mass effect and edema in the right frontal lobe, consistent with enlarging neoplasm. [\"With particular attention to the flare sequence it appears to be relatively stable compared to the one eight weeks ago\" per neurosurgery.] No new abnormality is identified elsewhere.    IMPRESSION AND PLAN  Ms. Alvarez is a 43 y.o., white female with:  1. Glioma: Initially diagnosed in 2007, with recurrences in 2014, late 2016, and, most recently, summer 2017. Now status post resection x 3 (in 2007, 2014 and 06/21/2017) and localized radiation x 2 (adjuvantly in 2007; and, most recently, a Cyberknife boost delivered in January 2017). Since a third resection was felt to not be in her best interest (at least " at the time, in early 2017) and she completed localized XRT, we agreed with neurosurgery's recommendation to start chemotherapy. She began Temodar (patient dose: 320 mg daily on days 1-5 of a 28 day cycle) by late January 2017 and completed five (5), qmonthly cycles. She tolerated this regimen overall well, with some mild, and manageable nausea and increasing fatigue her only noticeable side effects. With this treatment, her CBCs remained unremarkable with no developing cytopenias. Unfortunately, despite this therapy, she developed reworsening headaches; and a repeat MRI in late May 2017 was consistent with reprogressive disease. As a result, she underwent a repeat craniotomy on 06/21/17. She tolerated this debulking surgery well, and she was subsequently started on second-line, palliative therapy with v0nngolx Avastin. She began this treatment on 08/16/2017, completed twenty-one (21) cycles (due to her needing to undergo placement of an epidural spinal cord stimulator to treat her worsening, chronic back pain, a delay of about six weeks was required in between her eighth and ninth cycles). While this treatment plan had been working well throughout this time, for a total of about one year, the repeat MRI performed on 08/07/2018 (and summarized above) was unfortunately consistent with disease progression. Irinotecan was therefore added to her [id number] bevacizumab regimen. She has now received four (4) cycles of this new combination and is overall tolerating fairly well also (although profoundly worsening fatigue has been an issue, see below). The most recent repeat MRI (performed on 10/02/2018 and also summarized above) appeared relatively stable, per neurosurgery, although the patient remains aware that her disease will likely progress sooner rather than later and our palliative treatment options are becoming increasingly limited. For now, however, she is still doing fairly well; and we will proceed with this  current treatment plan (cycle #5 of irinotecan/bevacizumab today) while we attempt to palliate her fatigue. She will follow up with neurosurgery with a repeat MRI again in late November 2018, as previously planned. We will see her back in our clinic in two weeks (on the day of the sixth cycle of irinotecan/bevacizumab) with a CBC and CMP.  2. Chronic back pain: As discussed above, her bevacizumab had to be held for about six weeks in late 2017/early 2018 to allow placement of an epidural spinal cord stimulator. This was successfully done on 01/08/2018, to good, ongoing, palliative effect. Continue to follow up with neurosurgery, as previously planned.  3. Headaches: Her symptoms currently remain manageable with prn Motrin, and she still does not need something stronger (such as Norco) at this time. Continue to monitor.  4. Fatigue: Profoundly worsened since adding Irinotecan to her ongoing, [id number] palliative bevacizumab; and the primary symptom currently negatively affecting her quality of life. A Rx for daily Provigil (200 mg) was provided today. Continue to monitor.  5. Chronic sinusitis/allergies: Ongoing management per primary care.   6. Diarrhea: Continue Imodium and Lomotil prn. Continue to monitor.  The patient was in agreement with these plans.    ACO Quality Measures:  a) The patient does not use (and has never used) tobacco products.  b) The patient's BMI is above normal parameters. Plan includes a referral to primary care.  c) The current outpatient and discharge medications have been reconciled for the patient.    It is a pleasure to participate in Ms. Devlin's care. Please do not hesitate to call with any questions or concerns that you may have.    A total of 30 minutes were spent coordinating this patient’s care in clinic today; more than 50% of this time was spent face-to-face with the patient, reviewing her interim medical history and counseling on the current treatment and followup plan. All  questions were answered to her satisfaction.    FOLLOW UP  Continue [id number] irinotecan plus bevacizumab as planned (5th cycle today). Rx for Provigil 200 mg PO daily provided today. With neurosurgery with a repeat MRI of brain in late November 2018, as previously planned. Return to our clinic in 2 weeks (on day #1 of cycle #5 of Irinotecan/bevacizumab) with a CBC and CMP for another symptom/toxicity check.       This document was electronically signed by JEFFY Pinto MD October 24, 2018 11:33 AM      CC: MD Cirilo Becerra MD Marta Hayne, MD Beverly Paige Neace, PA

## 2018-10-31 NOTE — TELEPHONE ENCOUNTER
----- Message from Graciela Mathews sent at 10/30/2018  1:55 PM EDT -----  Regarding: PHONE CALL  Marcelina called about her medication?

## 2018-10-31 NOTE — PROGRESS NOTES
"      Specialty Pharmacy Note      Name:  Marcelina Alvarez  :  1975  Date:  10/31/2018         Past Medical History:   Diagnosis Date   • Anxiety and depression    • Arthritis     RIGHT FOOT \"DUE TO FOUR STRESS FRACTURES\"    • Brain tumor (CMS/HCC) ,,2017    SURGERY x3, CHEMO AND RADIATON    • Diabetes mellitus (CMS/HCC)     DX'D TYPE II NIDDM APPROX 6 WEEKS AGO, \"CAUSED BY CHEMO\" CHECKS BS -150 IN AM -275 PM    • Eczema    • Frequent headaches    • GERD (gastroesophageal reflux disease)    • History of shingles     MULTIPLE OUTBREAKS--NO OPEN AREAS AT THIS TIME \"SHOW UP JUST ABOVE BUTTOCKS\"    • Hypertension     CONTROLLED WITH MEDS PER PT    • IBS (irritable bowel syndrome)    • Low back pain    • Nerve damage     from shingles   • PONV (postoperative nausea and vomiting)    • Prophylactic chemotherapy     receives chemotherapy regularly due to brain tumor recurrence    • Sinus disease    • Wears reading eyeglasses        Past Surgical History:   Procedure Laterality Date   • BREAST SURGERY      REDUCTION    • CHOLECYSTECTOMY     • CRANIOTOMY FOR TUMOR Right 2017    Procedure: CRANIOTOMY FOR TUMOR STEREOTACTIC WITH STEALTH; 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 Gregorio)   • CRANIOTOMY FOR TUMOR  2007    CRANI & EXCISION OF LOW GRADE GLIOMA & LUMBAR DRAIN (Dr. Gregorio)   • ENDOMETRIAL ABLATION     • PORTACATH PLACEMENT     • SPINAL CORD STIMULATOR IMPLANT N/A 2018    Procedure: SPINAL CORD STIMULATOR INSERTION PHASE;  Surgeon: Cirilo Lemons MD;  Location:  [facility];  Service:        Social History     Social History   • Marital status:      Spouse name: N/A   • Number of children: N/A   • Years of education: N/A     Occupational History   • Not on file.     Social History Main Topics   • Smoking status: Never Smoker "   • Smokeless tobacco: Never Used   • Alcohol use No   • Drug use: No   • Sexual activity: Defer     Other Topics Concern   • Not on file     Social History Narrative   • No narrative on file       Family History   Problem Relation Age of Onset   • Hypertension Father         pulmonary       Allergies   Allergen Reactions   • Morphine And Related Hallucinations   • Diamox [Acetazolamide] Rash       Current Outpatient Prescriptions   Medication Sig Dispense Refill   • ACCU-CHEK JACOB PLUS test strip USE BID  3   • ACCU-CHEK SOFTCLIX LANCETS lancets      • ARIPiprazole (ABILIFY) 5 MG tablet Take 5 mg by mouth Daily.     • Armodafinil 250 MG tablet Take 1 tablet by mouth Daily. 30 tablet 5   • bevacizumab (AVASTIN) 100 MG/4ML chemo injection Infuse  into a venous catheter.     • diphenoxylate-atropine (LOMOTIL) 2.5-0.025 MG per tablet Take 1 tablet by mouth 4 (Four) Times a Day As Needed for Diarrhea. 30 tablet 3   • Dulaglutide (TRULICITY) 0.75 MG/0.5ML solution pen-injector Inject  under the skin.     • fluconazole (DIFLUCAN) 200 MG tablet      • gabapentin (NEURONTIN) 800 MG tablet Take 800 mg by mouth 3 (Three) Times a Day.     • ibuprofen (ADVIL,MOTRIN) 800 MG tablet Take 800 mg by mouth Every 8 (Eight) Hours As Needed for Mild Pain .     • lidocaine-prilocaine (EMLA) 2.5-2.5 % cream Apply 1 hour prior to VAD access (Patient taking differently: 1 application As Needed (before port access for chemo). Apply 1 hour prior to VAD access) 30 g 5   • lisinopril-hydrochlorothiazide (PRINZIDE,ZESTORETIC) 20-12.5 MG per tablet Take 1 tablet by mouth Daily.     • modafinil (PROVIGIL) 200 MG tablet Take 1 tablet by mouth Daily. 30 tablet 2   • NYSTATIN 476173 UNIT/GM powder      • ondansetron (ZOFRAN) 8 MG tablet Take 1 tablet by mouth Every 8 (Eight) Hours As Needed for Nausea or Vomiting. 30 tablet 5   • prochlorperazine (COMPAZINE) 10 MG tablet Take 1 tablet by mouth Every 6 (Six) Hours As Needed for Nausea or Vomiting. 60  tablet 3   • raNITIdine (ZANTAC) 300 MG tablet TK 1 T PO BID  5   • sertraline (ZOLOFT) 100 MG tablet Take 150 mg by mouth Daily.     • SITagliptin (JANUVIA) 100 MG tablet Take 100 mg by mouth Daily.     • valACYclovir (VALTREX) 500 MG tablet Take 1,000 mg by mouth As Needed (shingles).     • zolpidem (AMBIEN) 5 MG tablet Take 5 mg by mouth Every Night.       No current facility-administered medications for this visit.      Facility-Administered Medications Ordered in Other Visits   Medication Dose Route Frequency Provider Last Rate Last Dose   • mupirocin (BACTROBAN) 2 % nasal ointment   Nasal BID Ирина Interiano PA-C             LABORATORY:    Lab Results   Component Value Date    PROTIME 12.8 12/06/2017    INR 0.95 12/06/2017     Lab Results   Component Value Date    PROTIME 12.8 12/06/2017    INR 0.95 12/06/2017    PTT 28.9 12/06/2017     No results found for: HAV, HCVQUANT, CPOSJM73, HCVINFO, HCVGENOTYPE  No results found for: AMPHETSCREEN, BARBITSCNUR, LABBENZSCN, COCAINEUR, LABMETHSCN, KZQGQ3Z, [id number], HEPBSAB, OXYCODONESCN, 6ACETYLMORP, BUPRENORSCNU, LABOPIASCN, PCPUR, THCURSCR  Last Urine Toxicity     There is no flowsheet data to display.          ASSESSMENT/PLAN:    Specialty Pharmacy is consulted with treatment of cancer related fatigue. Nuvigil 250 mg daily was prescribed. It will be dispensed from [facility] pharmacy. Specialty Pharmacy will continue to follow as needed.

## 2018-11-14 NOTE — PROGRESS NOTES
Name:  Marcelina Alvarez  :  1975  Date:  2018     REFERRING PHYSICIAN  David Gregorio MD    PRIMARY CARE PROVIDER  Sarah Amado PA    REASON FOR FOLLOWUP  1. Astrocytoma (CMS/HCC)      CHIEF COMPLAINT  Chronic fatigue, improved some since starting daily Nuvigil in late 2018.    Dear Dr. Gregorio,    HISTORY OF PRESENT ILLNESS:   I saw Ms. Alvarez in follow up today in our medical oncology clinic. 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 She was initially diagnosed in , and you performed a successful resection. She subsequently underwent localized XRT (but no chemotherapy). She did very well until , when she developed a localized recurrence; and, again, you performed a successful resection (this time without adjuvant XRT). She was again doing well until late , when she started to develop some mild forgetfulness and headaches; and a repeat MRI showed evidence of recurrent disease again. This time, it was felt that repeat surgery was not in her best interest (at least at that time). She underwent Cyberknife treatment in early 2017 and tolerated it well. She was subsequently referred to our clinic to consider chemotherapy (Temodar). At the time of her initial appointment with us (on 2017), she was agreeable to this treatment (patient dose: 320 mg on days 1-5 of a 28-day cycle). She did overall well on this regimen for several months; however, unfortunately, repeat imaging in May 2017 showed evidence of disease reprogression. She underwent a repeat craniotomy for debulking in early summer 2017 and has been doing overall well since then on second-line palliative therapy with v2kftoir Avastin (plus Irinotecan beginning in late 2018).    INTERIM HISTORY:  Ms. Alvarez returns to clinic today for follow up by herself. Due to disease progression, Irinotecan was added to  "her ongoing, [id number] bevacizumab in late August 2018. She has received five (5) cycles of this new combination to date. While some increased nausea remains manageable with prn antiemetics, profoundly increased fatigue has been her primary problem with this new regimen (if it was not for the fatigue, she believes that she would still overall be doing very well). Daily Nuvigil was started in late October 2018, and she reports today that her fatigue is now better. She is sleeping less during the day and is consequently sleeping better at night and feeling more rested in the morning. She does continue to have frequent (but stable) headaches that remain manageable with prn Motrin. Dermatology is currently treating a rash (with a few ulcers) on her left groin with antibiotics and vinegar washes (and it has been getting better). She otherwise again has no new or other specific complaints.    Past Medical History:   Diagnosis Date   • Anxiety and depression    • Arthritis     RIGHT FOOT \"DUE TO FOUR STRESS FRACTURES\"    • Brain tumor (CMS/[facility]) 2010,2014,2017    SURGERY x3, CHEMO AND RADIATON    • Diabetes mellitus (CMS/[facility])     DX'D TYPE II NIDDM APPROX 6 WEEKS AGO, \"CAUSED BY CHEMO\" CHECKS BS -150 IN AM -275 PM    • Eczema    • Frequent headaches    • GERD (gastroesophageal reflux disease)    • History of shingles     MULTIPLE OUTBREAKS--NO OPEN AREAS AT THIS TIME \"SHOW UP JUST ABOVE BUTTOCKS\"    • Hypertension     CONTROLLED WITH MEDS PER PT    • IBS (irritable bowel syndrome)    • Low back pain    • Nerve damage     from shingles   • PONV (postoperative nausea and vomiting)    • Prophylactic chemotherapy     receives chemotherapy regularly due to brain tumor recurrence    • Sinus disease    • Wears reading eyeglasses        Past Surgical History:   Procedure Laterality Date   • BREAST SURGERY      REDUCTION    • CHOLECYSTECTOMY     • CRANIOTOMY FOR TUMOR  12/05/2014    CRANI FOR GLIOMA (Dr. Gregorio)   • " CRANIOTOMY FOR TUMOR  08/31/2007    CRANI & EXCISION OF LOW GRADE GLIOMA & LUMBAR DRAIN (Dr. Gregorio)   • ENDOMETRIAL ABLATION     • PORTACATH PLACEMENT         Social History     Socioeconomic History   • Marital status:      Spouse name: Not on file   • Number of children: Not on file   • Years of education: Not on file   • Highest education level: Not on file   Social Needs   • Financial resource strain: Not on file   • Food insecurity - worry: Not on file   • Food insecurity - inability: Not on file   • Transportation needs - medical: Not on file   • Transportation needs - non-medical: Not on file   Occupational History   • Not on file   Tobacco Use   • Smoking status: Never Smoker   • Smokeless tobacco: Never Used   Substance and Sexual Activity   • Alcohol use: No   • Drug use: No   • Sexual activity: Defer   Other Topics Concern   • Not on file   Social History Narrative   • Not on file       Family History   Problem Relation Age of Onset   • Hypertension Father         pulmonary       Allergies   Allergen Reactions   • Morphine And Related Hallucinations   • Diamox [Acetazolamide] Rash       Current Outpatient Medications   Medication Sig Dispense Refill   • ACCU-CHEK JACOB PLUS test strip USE BID  3   • ACCU-CHEK SOFTCLIX LANCETS lancets      • ARIPiprazole (ABILIFY) 5 MG tablet Take 5 mg by mouth Daily.     • Armodafinil 250 MG tablet Take 1 tablet by mouth Daily. 30 tablet 5   • bevacizumab (AVASTIN) 100 MG/4ML chemo injection Infuse  into a venous catheter.     • diphenoxylate-atropine (LOMOTIL) 2.5-0.025 MG per tablet Take 1 tablet by mouth 4 (Four) Times a Day As Needed for Diarrhea. 30 tablet 3   • Dulaglutide (TRULICITY) 0.75 MG/0.5ML solution pen-injector Inject  under the skin.     • fluconazole (DIFLUCAN) 200 MG tablet      • gabapentin (NEURONTIN) 800 MG tablet Take 800 mg by mouth 3 (Three) Times a Day.     • ibuprofen (ADVIL,MOTRIN) 800 MG tablet Take 800 mg by mouth Every 8 (Eight) Hours  As Needed for Mild Pain .     • lidocaine-prilocaine (EMLA) 2.5-2.5 % cream Apply 1 hour prior to VAD access (Patient taking differently: 1 application As Needed (before port access for chemo). Apply 1 hour prior to VAD access) 30 g 5   • lisinopril-hydrochlorothiazide (PRINZIDE,ZESTORETIC) 20-12.5 MG per tablet Take 1 tablet by mouth Daily.     • modafinil (PROVIGIL) 200 MG tablet Take 1 tablet by mouth Daily. 30 tablet 2   • NYSTATIN 004345 UNIT/GM powder      • ondansetron (ZOFRAN) 8 MG tablet Take 1 tablet by mouth Every 8 (Eight) Hours As Needed for Nausea or Vomiting. 30 tablet 5   • prochlorperazine (COMPAZINE) 10 MG tablet Take 1 tablet by mouth Every 6 (Six) Hours As Needed for Nausea or Vomiting. 60 tablet 3   • raNITIdine (ZANTAC) 300 MG tablet TK 1 T PO BID  5   • sertraline (ZOLOFT) 100 MG tablet Take 150 mg by mouth Daily.     • SITagliptin (JANUVIA) 100 MG tablet Take 100 mg by mouth Daily.     • valACYclovir (VALTREX) 500 MG tablet Take 1,000 mg by mouth As Needed (shingles).     • zolpidem (AMBIEN) 5 MG tablet Take 5 mg by mouth Every Night.       No current facility-administered medications for this visit.      Facility-Administered Medications Ordered in Other Visits   Medication Dose Route Frequency Provider Last Rate Last Dose   • mupirocin (BACTROBAN) 2 % nasal ointment   Nasal BID Ирина Interiano, PAWillaC         REVIEW OF SYSTEMS  CONSTITUTIONAL:  No fever, chills or night sweats. Worsening fatigue since Irinotecan was added to her ongoing, [id number] bevacizumab, as per the HPI above.  EYES:  No blurry vision, diplopia or other vision changes.  ENT:  No hearing loss, nosebleeds or sore throat. Chronic sinus congestion/allergies.  CARDIOVASCULAR:  No palpitations, arrhythmia, syncopal episodes or edema.  PULMONARY:  No hemoptysis, wheezing, chronic cough or shortness of breath.  GASTROINTESTINAL: Intermittent, manageable nausea. Intermittent, manageable diarrhea. No abdominal pain.    GENITOURINARY:  No hematuria, kidney stones or frequent urination.  MUSCULOSKELETAL:  Chronic back pains, improved status post stimulator implantation. Diffuse joint/bone aches, mild and stable.  INTEGUMENTARY: Left groin rash, as per the HPI above.  ENDOCRINE:  No excessive thirst or hot flashes.  HEMATOLOGIC:  No history of free bleeding, spontaneous bleeding or clotting.  IMMUNOLOGIC:  No allergies or frequent infections.  NEUROLOGIC: Chronic nerve pain in the lower back, now still much improved status post epidural spinal cord stimulator in early January 2018. Intermittent headaches, stable, as per the HPI above.  PSYCHIATRIC:  No anxiety or depression.    PHYSICAL EXAMINATION  /65   Pulse 86   Temp 97.1 °F (36.2 °C) (Oral)   Resp 18   Wt 105 kg (231 lb 9.6 oz)   SpO2 96%   BMI 42.36 kg/m²     GENERAL:  A well-developed, well-nourished, obese, white female in no acute distress.  HEENT:  Pupils equally round and reactive to light.  Extraocular muscles intact.  CARDIOVASCULAR:  Regular rate and rhythm.  No murmurs, gallops or rubs.  LUNGS:  Clear to auscultation bilaterally.  ABDOMEN:  Soft, nontender, nondistended with positive bowel sounds.  EXTREMITIES:  No clubbing, cyanosis or edema bilaterally.  SKIN:  No petechiae.  NEURO:  Cranial nerves grossly intact.  No focal deficits.  PSYCH:  Alert and oriented x3.    LABORATORY    Lab Results   Component Value Date    WBC 7.42 11/14/2018    HGB 15.8 11/14/2018    HCT 47.0 11/14/2018    MCV 85.1 11/14/2018     11/14/2018    NEUTROABS 3.86 11/14/2018       Lab Results   Component Value Date     10/24/2018    K 4.1 10/24/2018     10/24/2018    CO2 23.9 (L) 10/24/2018    BUN 14 10/24/2018    CREATININE 0.80 10/24/2018    GLUCOSE 157 (H) 10/24/2018    CALCIUM 9.2 10/24/2018    AST 38 (H) 10/24/2018    ALT 60 (H) 10/24/2018    ALKPHOS 57 10/24/2018    BILITOT 0.6 10/24/2018    PROTEINTOT 6.9 10/24/2018    ALBUMIN 4.10 10/24/2018  "    IMAGING  MRI brain with and without contrast (06/13/2016):  Impression: There are bilateral frontal changes related to prior surgery, right greater than left. There is some associated gliosis and minimal contrast enhancement in the superior leftward aspect of the surgical \"bed\". Most importantly, there has been no change since 02/22/2016.    MRI cyberknife brain with contrast (12/28/2016):  Impression: Areas of increased blood flow in the right frontal lobe in areas of abnormal contrast enhancement and therefore the abnormality seen on prior MRI is highly suspicious for tumor.    MRI brain with and without contrast (02/28/2017):  Impression: Slight interval increase in the size of the area of abnormal contrast enhancement diffusely throughout the right frontal region. The amount of surrounding edema is also increased in the interval. Findings may be related to progression of disease however postradiation changes cannot be excluded. The edema extends into the right basal ganglia and parietal lobe. No new area of abnormal contrast enhancement identified. [Per a neurosurgery read and evaluation that day, the intensity of the enhancement is somewhat lessened, and the flare appears to be about the same.]    MRI brain with and without contrast (04/27/2017):  Impression: Increased cerebral blood volume in the area of abnormal contrast enhancement most consistent with neovascularity and tumor progression. The size and surrounding edema is stable.    MRI brain with and without contrast (05/30/2017):  Impression:  1) Intense, enhancing lesion right frontal lobe with interval evidence of increasing mass effect and edema with increasing midline shift from right to left and increasing compression of the anterior horn right lateral ventricle. The size of the enhancing lesion is very slightly larger by careful comparison and measurement. The degree of overall edema in the right frontal lobe has increased and now crosses the " midline to the left frontal lobe. There is midline shift right to left which has increased somewhat since previous studies.  2) Therefore, the findings in the right frontal lobe are slowly progressive by multiple parameters. A distant lesion is not identified elsewhere.    CT cerebral perfusion with and without contrast (05/30/2017):  Impression:  1) Large mass lesion right frontal lobe exhibiting marked increase in permeability. There is mass effect and edema diffusely in the right frontal lobe with a high grade defect of cerebral blood flow with sporadic areas of preserved cerebral blood volume.  2) There is marked delay in the mean transit time and time to drain in the right frontal tumor and mass region.  3) No other cerebral insults, ischemic abnormalities or parametric map abnormalities are identified elsewhere, outside of the large right frontal mass.    MRI brain with and without contrast (09/19/2017):  Impression:  1) Continued, interval decrease in right frontal lobe postoperative findings with overall decreased vasogenic edema and hemorrhage from prior. No acute hemorrhage or enhancing soft tissue focus to suggest residual enhancing tumor. No evidence for distant, enhancing lesion.  2) No acute intracranial pathology.    MRI brain with and without contrast (11/14/17)  Impression:  1. Complex septated cystic mass with perimeter enhancement right frontal  region surrounded by gliotic increased FLAIR and T2 signal radiating  into the contralateral left frontal lobe and surrounding the mass in the  right frontal lobe. 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  2. The size of the right frontal cystic lesion, the degree of perimeter  enhancement and the overall degree of abnormal FLAIR and T2 signal in  the frontal lobes is stable without change or progression when compared  to previous studies of 09/19/2017.  3. Further, new or enhancing lesion elsewhere is not currently  identified.  "Therefore, the findings in the brain, more specifically in  the frontal lobes, are stable when compared to the most recent studies.    MRI brain with and without contrast (04/23/2018):  Impression: Stable appearance of cystic collection right frontal lobe with surrounding vasogenic edema and/or nonenhancing tumor. Peripheral enhancement is persistent; however, no new enhancement, progression or enhancing nodularity is identified. No abnormal enhancement otherwise noted. No developing hydrocephalus.    MRI brain with and without contrast (08/07/2018):  Impression: There has been unequivocal progression of disease when compared with the previous examination of 04/23/2018. Specifically, there is bifrontal cystic change which is stable. However, there is abnormal contrast enhancement posterior to the cystic change on the right involving the frontal lobe as well as the caudate lobe and lentiform nucleus. There is also significantly greater vasogenic edema involving the new abnormalities.    MRI brain with and without contrast (10/02/2018):  Impression: Increasing masslike enhancement, localized mass effect and edema in the right frontal lobe, consistent with enlarging neoplasm. [\"With particular attention to the flare sequence it appears to be relatively stable compared to the one eight weeks ago\" per neurosurgery.] No new abnormality is identified elsewhere.    IMPRESSION AND PLAN  Ms. Alvarez is a 43 y.o., white female with:  1. Glioma: Initially diagnosed in 2007, with recurrences in 2014, late 2016, and, most recently, summer 2017. Now status post resection x 3 (in 2007, 2014 and 06/21/2017) and localized radiation x 2 (adjuvantly in 2007; and, most recently, a Cyberknife boost delivered in January 2017). Since a third resection was felt to not be in her best interest (at least at the time, in early 2017) and she completed localized XRT, we agreed with neurosurgery's recommendation to start chemotherapy. She began " Temodar (patient dose: 320 mg daily on days 1-5 of a 28 day cycle) by late January 2017 and completed five (5), qmonthly cycles. She tolerated this regimen overall well, with some mild, and manageable nausea and increasing fatigue her only noticeable side effects. With this treatment, her CBCs remained unremarkable with no developing cytopenias. Unfortunately, despite this therapy, she developed reworsening headaches; and a repeat MRI in late May 2017 was consistent with reprogressive disease. As a result, she underwent a repeat craniotomy on 06/21/17. She tolerated this debulking surgery well, and she was subsequently started on second-line, palliative therapy with [id number] Avastin. She began this treatment on 08/16/2017, completed twenty-one (21) cycles (due to her needing to undergo placement of an epidural spinal cord stimulator to treat her worsening, chronic back pain, a delay of about six weeks was required in between her eighth and ninth cycles). While this treatment plan had been working well throughout this time, for a total of about one year, the repeat MRI performed on 08/07/2018 (and summarized above) was unfortunately consistent with disease progression. Irinotecan was therefore added to her [id number] bevacizumab regimen. She has now received five (5) cycles of this new combination and is overall tolerating fairly well also (although worsening fatigue has been a recent issue, see below). The most recent repeat MRI (performed on 10/02/2018 and also summarized above) appeared relatively stable, per neurosurgery, although the patient remains aware that her disease will likely progress sooner rather than later and our palliative treatment options are becoming increasingly limited. For now, however, she is still doing fairly well; and we will proceed with this current treatment plan (cycle #6 of irinotecan/bevacizumab today) while we attempt to palliate her fatigue. She will follow up with neurosurgery with  a repeat MRI again in late November 2018, as previously planned. We will see her back in our clinic in two weeks (on the day of the seventh cycle of irinotecan/bevacizumab, the day after the MRI and NS appointment) with a CBC and CMP.  2. Chronic back pain: As discussed above, her bevacizumab had to be held for about six weeks in late 2017/early 2018 to allow placement of an epidural spinal cord stimulator. This was successfully done on 01/08/2018, to good, ongoing, palliative effect. Continue to follow up with neurosurgery, as previously planned.  3. Headaches: Her symptoms currently remain manageable with prn Motrin, and she still does not need something stronger (such as Norco) at this time. Continue to monitor.  4. Fatigue: Profoundly worsened since adding Irinotecan to her ongoing, [id number] palliative bevacizumab; and the primary symptom currently negatively affecting her quality of life. A Rx for daily Nuvigil 250 mg PO daily was provided in late October 2018. She has been on this new medication for about two weeks now and states that it seems to be helping her symptoms. She is sleeping less during the day and is consequently sleeping better at night and waking up more rested. Continue to monitor.  5. Chronic sinusitis/allergies: Ongoing management per primary care.   6. Diarrhea: Continue Imodium and Lomotil prn. Continue to monitor.  7. Rash: In the left groin. Improving. Ongoing management per dermatology. We will hold today's dose of irinotecan to help allow this area to more fully heal.  The patient was in agreement with these plans.    ACO Quality Measures:  a) The patient does not use (and has never used) tobacco products.  b) The patient's BMI is above normal parameters. Plan includes a referral to primary care.  c) The current outpatient and discharge medications have been reconciled for the patient.    It is a pleasure to participate in Ms. Devlin's care. Please do not hesitate to call with any  questions or concerns that you may have.    A total of 30 minutes were spent coordinating this patient’s care in clinic today; more than 50% of this time was spent face-to-face with the patient, reviewing her interim medical history and counseling on the current treatment and followup plan. All questions were answered to her satisfaction.    FOLLOW UP  With dermatology as previously planned. Continue [id number] irinotecan plus bevacizumab as planned (6th cycle today). Continue Nuvigil 250 mg PO daily. With neurosurgery with a repeat MRI of brain in late November 2018, as previously planned. Return to our clinic in 2 weeks (on day #1 of cycle #7 of Irinotecan/bevacizumab, the day after the MRI/NS appointments) with a CBC and CMP.      This document was electronically signed by JEFFY Pinto MD November 14, 2018 9:41 AM      CC: MD Cirilo Becerra MD Marta Hayne, MD Beverly Paige Neace, PA

## 2018-11-27 PROBLEM — C71.1 MALIGNANT NEOPLASM OF FRONTAL LOBE (HCC): Status: ACTIVE | Noted: 2018-01-01

## 2018-11-27 NOTE — PROGRESS NOTES
"Marcelina Alvarez  1975  [id number]                        CHIEF COMPLAINT:  [Malignant brain tumor ]         MEDICAL HISTORY SINCE LAST ENCOUNTER:  [She has continued with increasing headache and some issues with memory loss.  She reports today for follow-up MRI. ]           Past Medical History:   Diagnosis Date   • Anxiety and depression    • Arthritis     RIGHT FOOT \"DUE TO FOUR STRESS FRACTURES\"    • Brain tumor (CMS/HCC) 2010,2014,2017    SURGERY x3, CHEMO AND RADIATON    • Diabetes mellitus (CMS/HCC)     DX'D TYPE II NIDDM APPROX 6 WEEKS AGO, \"CAUSED BY CHEMO\" CHECKS BS -150 IN AM -275 PM    • Eczema    • Frequent headaches    • GERD (gastroesophageal reflux disease)    • History of shingles     MULTIPLE OUTBREAKS--NO OPEN AREAS AT THIS TIME \"SHOW UP JUST ABOVE BUTTOCKS\"    • Hypertension     CONTROLLED WITH MEDS PER PT    • IBS (irritable bowel syndrome)    • Low back pain    • Nerve damage     from shingles   • PONV (postoperative nausea and vomiting)    • Prophylactic chemotherapy     receives chemotherapy regularly due to brain tumor recurrence    • Sinus disease    • Wears reading eyeglasses               Past Surgical History:   Procedure Laterality Date   • BREAST SURGERY      REDUCTION    • CHOLECYSTECTOMY     • CRANIOTOMY FOR TUMOR  12/05/2014    CRANI FOR GLIOMA (Dr. Gregorio)   • CRANIOTOMY FOR TUMOR  08/31/2007    CRANI & EXCISION OF LOW GRADE GLIOMA & LUMBAR DRAIN (Dr. Gregorio)   • ENDOMETRIAL ABLATION     • PORTACATH PLACEMENT                Family History   Problem Relation Age of Onset   • Hypertension Father         pulmonary              Social History     Socioeconomic History   • Marital status:      Spouse name: Not on file   • Number of children: Not on file   • Years of education: Not on file   • Highest education level: Not on file   Social Needs   • Financial resource strain: Not on file   • Food insecurity - worry: Not on file   • Food insecurity - inability: Not " on file   • Transportation needs - medical: Not on file   • Transportation needs - non-medical: Not on file   Occupational History   • Not on file   Tobacco Use   • Smoking status: Never Smoker   • Smokeless tobacco: Never Used   Substance and Sexual Activity   • Alcohol use: No   • Drug use: No   • Sexual activity: Defer   Other Topics Concern   • Not on file   Social History Narrative   • Not on file              Allergies   Allergen Reactions   • Morphine And Related Hallucinations   • Diamox [Acetazolamide] Rash              Current Outpatient Medications:   •  ACCU-CHEK JACOB PLUS test strip, USE BID, Disp: , Rfl: 3  •  ACCU-CHEK SOFTCLIX LANCETS lancets, , Disp: , Rfl:   •  ARIPiprazole (ABILIFY) 5 MG tablet, Take 5 mg by mouth Daily., Disp: , Rfl:   •  Armodafinil 250 MG tablet, Take 1 tablet by mouth Daily., Disp: 30 tablet, Rfl: 5  •  bevacizumab (AVASTIN) 100 MG/4ML chemo injection, Infuse  into a venous catheter., Disp: , Rfl:   •  diphenoxylate-atropine (LOMOTIL) 2.5-0.025 MG per tablet, Take 1 tablet by mouth 4 (Four) Times a Day As Needed for Diarrhea., Disp: 30 tablet, Rfl: 3  •  Dulaglutide (TRULICITY) 0.75 MG/0.5ML solution pen-injector, Inject  under the skin., Disp: , Rfl:   •  fluconazole (DIFLUCAN) 200 MG tablet, , Disp: , Rfl:   •  gabapentin (NEURONTIN) 800 MG tablet, Take 800 mg by mouth 3 (Three) Times a Day., Disp: , Rfl:   •  ibuprofen (ADVIL,MOTRIN) 800 MG tablet, Take 800 mg by mouth Every 8 (Eight) Hours As Needed for Mild Pain ., Disp: , Rfl:   •  lidocaine-prilocaine (EMLA) 2.5-2.5 % cream, Apply 1 hour prior to VAD access (Patient taking differently: 1 application As Needed (before port access for chemo). Apply 1 hour prior to VAD access), Disp: 30 g, Rfl: 5  •  lisinopril-hydrochlorothiazide (PRINZIDE,ZESTORETIC) 20-12.5 MG per tablet, Take 1 tablet by mouth Daily., Disp: , Rfl:   •  modafinil (PROVIGIL) 200 MG tablet, Take 1 tablet by mouth Daily., Disp: 30 tablet, Rfl: 2  •   NYSTATIN 178003 UNIT/GM powder, , Disp: , Rfl:   •  ondansetron (ZOFRAN) 8 MG tablet, Take 1 tablet by mouth Every 8 (Eight) Hours As Needed for Nausea or Vomiting., Disp: 30 tablet, Rfl: 5  •  prochlorperazine (COMPAZINE) 10 MG tablet, Take 1 tablet by mouth Every 6 (Six) Hours As Needed for Nausea or Vomiting., Disp: 60 tablet, Rfl: 3  •  raNITIdine (ZANTAC) 300 MG tablet, TK 1 T PO BID, Disp: , Rfl: 5  •  sertraline (ZOLOFT) 100 MG tablet, Take 150 mg by mouth Daily., Disp: , Rfl:   •  SITagliptin (JANUVIA) 100 MG tablet, Take 100 mg by mouth Daily., Disp: , Rfl:   •  valACYclovir (VALTREX) 500 MG tablet, Take 1,000 mg by mouth As Needed (shingles)., Disp: , Rfl:   •  zolpidem (AMBIEN) 5 MG tablet, Take 5 mg by mouth Every Night., Disp: , Rfl:   No current facility-administered medications for this visit.     Facility-Administered Medications Ordered in Other Visits:   •  mupirocin (BACTROBAN) 2 % nasal ointment, , Nasal, BID, Ирина Interiano, KIMBERLY         Review of Systems   Constitutional: Negative for activity change, appetite change, chills, diaphoresis, fatigue, fever and unexpected weight change.   HENT: Positive for congestion, postnasal drip, rhinorrhea, sinus pressure and sneezing. Negative for dental problem, drooling, ear discharge, ear pain, facial swelling, hearing loss, mouth sores, nosebleeds, sore throat, tinnitus, trouble swallowing and voice change.    Eyes: Negative for photophobia, pain, discharge, redness, itching and visual disturbance.   Respiratory: Negative for apnea, cough, choking, chest tightness, shortness of breath, wheezing and stridor.    Cardiovascular: Negative for chest pain, palpitations and leg swelling.   Gastrointestinal: Negative for abdominal distention, abdominal pain, anal bleeding, blood in stool, constipation, diarrhea, nausea, rectal pain and vomiting.   Endocrine: Negative for cold intolerance, heat intolerance, polydipsia, polyphagia and polyuria.   Genitourinary:  Negative for decreased urine volume, difficulty urinating, dysuria, enuresis, flank pain, frequency, genital sores, hematuria and urgency.   Musculoskeletal: Negative for arthralgias, back pain, gait problem, joint swelling, myalgias, neck pain and neck stiffness.   Skin: Negative for color change, pallor, rash and wound.   Allergic/Immunologic: Negative for environmental allergies, food allergies and immunocompromised state.   Neurological: Negative for dizziness, tremors, seizures, syncope, facial asymmetry, speech difficulty, weakness, light-headedness, numbness and headaches.   Hematological: Negative for adenopathy. Does not bruise/bleed easily.   Psychiatric/Behavioral: Negative for agitation, behavioral problems, confusion, decreased concentration, dysphoric mood, hallucinations, self-injury, sleep disturbance and suicidal ideas. The patient is not nervous/anxious and is not hyperactive.              There were no vitals filed for this visit.            EXAMINATION: Essentially unchanged.  No papilledema.            MEDICAL DECISION MAKING: The MRI unfortunately shows progression           ASSESSMENT/DISPOSITION: I have started dexamethasone 4 mg twice a day for her headache; have given her a prescription of Norco 7.5 twice a day.  She will call me this forthcoming Monday in reference to her headache.  She has been very stoic throughout this entire ordeal.  We have discussed discontinuing all treatment with her projected survival of an average of 6 months, she is to receive additional chemotherapy tomorrow which I certainly think is reasonable in this young lady.  I will see her in 6 weeks.              I APPRECIATE THE OPPORTUNITY OF THIS REFERRAL. PLEASE CALL IF ANY       QUESTIONS [phone]    Scribed for David Gregorio MD by Liza Hanley CMA. 11/27/2018 1:09 PM    I have read and concur with the information provided by the scribe.  David Gregorio MD

## 2018-11-27 NOTE — PATIENT INSTRUCTIONS
Call Jg on Monday  Call Dr. Gregorio on a Monday or Tuesday with an update.    Ask for Mindi () and leave a message for  Dr. Gregorio.   He will call you back at the end of the day as soon as he can.     [phone]

## 2018-12-03 NOTE — TELEPHONE ENCOUNTER
Provider:  Jg  Caller: Danielle Bui-diabetes coordinator-[place]   Time of call:   10:45  Phone #:[phone]  Surgery:  Malignant neoplasm of frontal lobe  Surgery Date:  None  Last visit:   11-27-18  Next visit: 01/07/19    ANURAG:    RONNIE     Reason for call:     Danielle Bui, the diabetic coordinator in [facility] wanted Dr. Gregorio to know that she has been unsuccessful in reaching Marcelina Alvarez.    I was able to reach patient with no problem.  I reached out to Danielle to let her know that patient is on the schedule and she said those appointments have been canceled, or will be.  I asked her to call the patient that she is available now.  She was thankful for the call.

## 2018-12-07 NOTE — PROGRESS NOTES
SS spoke with patient's nurse Magdalena this date who states that patient rated herself a 9 out of 10 on the [employer] Distress Thermometer.  Pt checked the boxes for Depression, Fears, Nervousness, Sadness, Worry, Loss of Interest in usual activities, appearance, bathing/dressing, breathing, fatigue, feeling swollen, and memory/concentration.    SS spoke with pt about antidepressants.  Pt states that she has been taking Zoloft and Abilify.  Pt declines counseling services for herself but states that she may be interested for her 10 Y/O daughter.  SS provided pt with name and number for Anaaishwarya Bateman LCSW.  Pt states that she is feeling very depressed.  Pt states that this is the fourth chemotherapy that she will have taken for cancer and that tumor is bigger than it was when she was originally diagnosed.  Pt states that daughter worries about her and her appearance.  Pt states that she and her daughter have always been close but lately the relationship has been strained due to her illness.  Pt states that she writes daughter letters and journals daily so that her daughter will have this upon her death.  Pt states that she and spouse have made arrangements for daughter to live with Aunt upon her death due to spouse (father of daughter) driving a truck and being gone all week.  Pt states that she is feeling a little overwhelmed with all that is going on with her treatments and cancer.  Pt states that she would never hurt herself because she wouldn't want her daughter to find her and she knows that she wouldn't go to [facility].  SS explained to pt that appointment for counselor can be arranged at any time if she is agreeable.  Pt has social workers phone number and agreeable to calling if needed.     SS provided pt with hats this date. SS will follow.

## 2018-12-10 NOTE — TELEPHONE ENCOUNTER
Telephoned pt to check on her after her treatment on Friday.  Pt stated she was doing ok, denies any complaints.  Pt aware to call the office regarding any concerns

## 2018-12-21 PROBLEM — E86.0 DEHYDRATION: Status: ACTIVE | Noted: 2018-01-01

## 2018-12-21 NOTE — PROGRESS NOTES
"Patient in today for C1D15 of Avastin/Carboplatin therapy for brain CA.  Pt reported multiple complaints/problems that were then all reviewed w/ Dr. Pinto and orders/instruction given:   1) Intermittent HA pain occurring 3-4 times/week.  When HA pain occurs it is sometimes frontal, and at other times to back of head; HA is accompanied w/ vertigo & lightheadedness; denies any vision changes w/ HA; HA pain will be anywhere from mild (8/10) to severe (10/10), and is stabbing or throbbing when it occurs.  Pt has been taking 800mg Ibuprofen prn for the HA pain; per pt, she received a printed Rx for [place] 7.5/325 from Dr. Gregorio on 11/27 for the HA pain that she lost and never had filled.   Per Dr. Pinto, pt is to d/c Ibuprofen use immediately (d/t bleeding risks associated w/ Avastin therapy).  New printed script for Norco prn provided to pt today.    2) Pt reports having diarrhea/loose stools (2-3 episodes daily), that she is managing with prn lomotil.  She states drinking less than 20 ounces of water daily; she reports having increased burning & frequency w/ urination, itching & discomfort (\"similar to previous yeast infections\" she has had), w/ urine being dark, concentrated, w/ a strong odor.  Pt states she has had urinary changes over the past 2-3 days; denies any fevers.  UA collected as ordered today.    Order received from Dr. Pinto for prescription for Diflucan received for patient to begin.  Order received to give pt 1 Liter NS IVF today w/ Avastin therapy.   3) Pt states that insulin Rx coverage was supposed to begin per Dr. Gregorio, but no prescription was ever ordered.     Per Dr. Pinto, pt should contact PCP for further care & management of insulin & DM coverage.   All orders & instructions reviewed with pt and her .  Both verbalized understanding, and were in agreement with plan of care.   "

## 2018-12-31 NOTE — TELEPHONE ENCOUNTER
Telephoned pt to make sure she was aware she had a follow up on Wednesday, 1/2/19 @ 10:00.  Pt stated she would be here.  Pt aware to call the office regarding any questions or concerns.

## 2019-01-01 ENCOUNTER — OFFICE VISIT (OUTPATIENT)
Dept: ONCOLOGY | Facility: CLINIC | Age: 44
End: 2019-01-01

## 2019-01-01 ENCOUNTER — DOCUMENTATION (OUTPATIENT)
Dept: ONCOLOGY | Facility: HOSPITAL | Age: 44
End: 2019-01-01

## 2019-01-01 VITALS
TEMPERATURE: 94.6 F | WEIGHT: 218 LBS | HEART RATE: 61 BPM | DIASTOLIC BLOOD PRESSURE: 84 MMHG | BODY MASS INDEX: 39.87 KG/M2 | RESPIRATION RATE: 18 BRPM | OXYGEN SATURATION: 95 % | SYSTOLIC BLOOD PRESSURE: 138 MMHG

## 2019-01-01 DIAGNOSIS — C71.1 MALIGNANT NEOPLASM OF FRONTAL LOBE (HCC): Primary | ICD-10-CM

## 2019-01-01 LAB
BACTERIA SPEC AEROBE CULT: NORMAL
BACTERIA SPEC AEROBE CULT: NORMAL

## 2019-01-01 PROCEDURE — 99214 OFFICE O/P EST MOD 30 MIN: CPT | Performed by: INTERNAL MEDICINE

## 2019-01-01 RX ORDER — DEXAMETHASONE 4 MG/1
4 TABLET ORAL 3 TIMES DAILY
Qty: 60 TABLET | Refills: 0 | Status: SHIPPED | OUTPATIENT
Start: 2019-01-01

## 2019-01-02 NOTE — PROGRESS NOTES
Name:  Marcelina Alvarez  :  1975  Date:  2019     REFERRING PHYSICIAN  David Gregorio MD    PRIMARY CARE PROVIDER  Sarah Amado PA    REASON FOR FOLLOWUP  1. Malignant neoplasm of frontal lobe (CMS/HCC)      CHIEF COMPLAINT  Worsening headaches, memory loss and balance.    Dear Dr. Gregorio,    HISTORY OF PRESENT ILLNESS:   I saw Ms. Alvarez in follow up today in our medical oncology clinic. 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 She was initially diagnosed in , and you performed a successful resection. She subsequently underwent localized XRT (but no chemotherapy). She did very well until , when she developed a localized recurrence; and, again, you performed a successful resection (this time without adjuvant XRT). She was again doing well until late , when she started to develop some mild forgetfulness and headaches; and a repeat MRI showed evidence of recurrent disease again. This time, it was felt that repeat surgery was not in her best interest (at least at that time). She underwent Cyberknife treatment in early 2017 and tolerated it well. She was subsequently referred to our clinic to consider chemotherapy (Temodar). At the time of her initial appointment with us (on 2017), she was agreeable to this treatment (patient dose: 320 mg on days 1-5 of a 28-day cycle). She did overall well on this regimen for several months; however, unfortunately, repeat imaging in May 2017 showed evidence of disease reprogression. She underwent a repeat craniotomy for debulking in early summer 2017 and has been doing overall well since then on second-line palliative therapy with [id number] Avastin (plus Irinotecan beginning in late 2018).    INTERIM HISTORY:  Ms. Alvarez returns to clinic today for follow up accompanied by her daughter and a neighbor. Unfortunately, in recent weeks, her  "headaches, memory loss and balance have been steadily worsening again, despite a recent change in her palliative treatment to carboplatin/Avastin.    Past Medical History:   Diagnosis Date   • Anxiety and depression    • Arthritis     RIGHT FOOT \"DUE TO FOUR STRESS FRACTURES\"    • Brain tumor (CMS/HCC) 2010,2014,2017    SURGERY x3, CHEMO AND RADIATON    • Diabetes mellitus (CMS/HCC)     DX'D TYPE II NIDDM APPROX 6 WEEKS AGO, \"CAUSED BY CHEMO\" CHECKS BS -150 IN AM -275 PM    • Eczema    • Frequent headaches    • GERD (gastroesophageal reflux disease)    • History of shingles     MULTIPLE OUTBREAKS--NO OPEN AREAS AT THIS TIME \"SHOW UP JUST ABOVE BUTTOCKS\"    • Hypertension     CONTROLLED WITH MEDS PER PT    • IBS (irritable bowel syndrome)    • Low back pain    • Nerve damage     from shingles   • PONV (postoperative nausea and vomiting)    • Prophylactic chemotherapy     receives chemotherapy regularly due to brain tumor recurrence    • Sinus disease    • Wears reading eyeglasses        Past Surgical History:   Procedure Laterality Date   • BREAST SURGERY      REDUCTION    • CHOLECYSTECTOMY     • CRANIOTOMY FOR TUMOR Right 6/21/2017    Procedure: CRANIOTOMY FOR TUMOR STEREOTACTIC WITH STEALTH; planned right frontal lobectomy for astrocytoma progression;  Surgeon: David Gregorio MD;  Location:  [email or web address];  Service:    • CRANIOTOMY FOR TUMOR  12/05/2014    CRANI FOR GLIOMA (Dr. Gregorio)   • CRANIOTOMY FOR TUMOR  08/31/2007    CRANI & EXCISION OF LOW GRADE GLIOMA & LUMBAR DRAIN (Dr. Gregorio)   • ENDOMETRIAL ABLATION     • PORTACATH PLACEMENT     • SPINAL CORD STIMULATOR IMPLANT N/A 1/8/2018    Procedure: SPINAL CORD STIMULATOR INSERTION PHASE;  Surgeon: Cirilo Lemons MD;  Location:  [facility];  Service:        Social History     Socioeconomic History   • Marital status:      Spouse name: Not on file   • Number of children: Not on file   • Years of education: Not on file   • Highest education " level: Not on file   Social Needs   • Financial resource strain: Not on file   • Food insecurity - worry: Not on file   • Food insecurity - inability: Not on file   • Transportation needs - medical: Not on file   • Transportation needs - non-medical: Not on file   Occupational History   • Not on file   Tobacco Use   • Smoking status: Never Smoker   • Smokeless tobacco: Never Used   Substance and Sexual Activity   • Alcohol use: No   • Drug use: No   • Sexual activity: Defer   Other Topics Concern   • Not on file   Social History Narrative   • Not on file       Family History   Problem Relation Age of Onset   • Hypertension Father         pulmonary       Allergies   Allergen Reactions   • Morphine And Related Hallucinations   • Diamox [Acetazolamide] Rash       Current Outpatient Medications   Medication Sig Dispense Refill   • ACCU-CHEK JACOB PLUS test strip USE BID  3   • ACCU-CHEK SOFTCLIX LANCETS lancets      • amoxicillin-clavulanate (AUGMENTIN) 875-125 MG per tablet Take 1 tablet by mouth 2 (Two) Times a Day. 20 tablet 0   • ARIPiprazole (ABILIFY) 5 MG tablet Take 5 mg by mouth Daily.     • Armodafinil 250 MG tablet Take 1 tablet by mouth Daily. 30 tablet 5   • bevacizumab (AVASTIN) 100 MG/4ML chemo injection Infuse  into a venous catheter.     • dexamethasone (DECADRON) 4 MG tablet Take 1 tablet by mouth 2 (Two) Times a Day With Meals. 60 tablet 0   • diphenoxylate-atropine (LOMOTIL) 2.5-0.025 MG per tablet Take 1 tablet by mouth 4 (Four) Times a Day As Needed for Diarrhea. 30 tablet 3   • Dulaglutide (TRULICITY) 0.75 MG/0.5ML solution pen-injector Inject  under the skin.     • gabapentin (NEURONTIN) 800 MG tablet Take 800 mg by mouth 3 (Three) Times a Day.     • HYDROcodone-acetaminophen (NORCO) 7.5-325 MG per tablet Take 1 tablet by mouth 2 (Two) Times a Day As Needed for Moderate Pain. 60 tablet 0   • lidocaine-prilocaine (EMLA) 2.5-2.5 % cream Apply 1 hour prior to VAD access (Patient taking differently: 1  application As Needed (before port access for chemo). Apply 1 hour prior to VAD access) 30 g 5   • lisinopril-hydrochlorothiazide (PRINZIDE,ZESTORETIC) 20-12.5 MG per tablet Take 1 tablet by mouth Daily.     • magic mouthwash oral suspension Swish and spit 5 mL Every 4 (Four) Hours As Needed for Mucositis. 280 mL 1   • modafinil (PROVIGIL) 200 MG tablet Take 1 tablet by mouth Daily. 30 tablet 2   • NYSTATIN 847324 UNIT/GM powder      • nystatin in diphenhydrAMINE liquid-aluminum-magnesium hydroxide-simethicone suspension-lidocaine viscous solution SWISH AND SWALLOW 5 ML 4 TIMES DAILY AS NEEDED. 240 mL 1   • ondansetron (ZOFRAN) 8 MG tablet Take 1 tablet by mouth Every 8 (Eight) Hours As Needed for Nausea or Vomiting. 30 tablet 5   • prochlorperazine (COMPAZINE) 10 MG tablet Take 1 tablet by mouth Every 6 (Six) Hours As Needed for Nausea or Vomiting. 60 tablet 3   • raNITIdine (ZANTAC) 300 MG tablet TK 1 T PO BID  5   • sertraline (ZOLOFT) 100 MG tablet Take 150 mg by mouth Daily.     • SITagliptin (JANUVIA) 100 MG tablet Take 100 mg by mouth Daily.     • valACYclovir (VALTREX) 500 MG tablet Take 1,000 mg by mouth As Needed (shingles).     • zolpidem (AMBIEN) 5 MG tablet Take 5 mg by mouth Every Night.       No current facility-administered medications for this visit.      Facility-Administered Medications Ordered in Other Visits   Medication Dose Route Frequency Provider Last Rate Last Dose   • mupirocin (BACTROBAN) 2 % nasal ointment   Nasal BID Ирина Interiano, PAWillaC         REVIEW OF SYSTEMS  CONSTITUTIONAL:  No fever, chills or night sweats. Chronic fatigue.  EYES:  No blurry vision, diplopia or other vision changes.  ENT:  No hearing loss, nosebleeds or sore throat. Chronic sinus congestion/allergies.  CARDIOVASCULAR:  No palpitations, arrhythmia, syncopal episodes or edema.  PULMONARY:  No hemoptysis, wheezing, chronic cough or shortness of breath.  GASTROINTESTINAL: Intermittent nausea. Intermittent diarrhea.  No abdominal pain.   GENITOURINARY:  No hematuria, kidney stones or frequent urination.  MUSCULOSKELETAL:  Chronic back pains, improved status post stimulator implantation. Diffuse joint/bone aches, mild and stable.  INTEGUMENTARY: Recent, left-sided groin rash, currently better.  ENDOCRINE:  No excessive thirst or hot flashes.  HEMATOLOGIC:  No history of free bleeding, spontaneous bleeding or clotting.  IMMUNOLOGIC:  No allergies or frequent infections.  NEUROLOGIC: As per the HPI above.  PSYCHIATRIC:  No anxiety.    PHYSICAL EXAMINATION    /84   Pulse 61   Temp 94.6 °F (34.8 °C) (Oral)   Resp 18   Wt 98.9 kg (218 lb)   SpO2 95%   BMI 39.87 kg/m²     GENERAL:  A well-developed, well-nourished, obese, chronically ill-appearing, white female in no acute distress.  HEENT:  Pupils equally round and reactive to light. Extraocular muscles intact.  CARDIOVASCULAR:  Regular rate and rhythm. No murmurs, gallops or rubs.  LUNGS:  Clear to auscultation bilaterally.  ABDOMEN:  Soft, nontender, nondistended with positive bowel sounds.  EXTREMITIES:  No clubbing, cyanosis or edema bilaterally.  SKIN:  No petechiae.  NEURO:  Recently worsening balance issue and memory loss. Patient cannot walk a straight line.  PSYCH:  Alert and oriented x3.    LABORATORY    Lab Results   Component Value Date    WBC 7.91 12/28/2018    HGB 18.3 (H) 12/28/2018    HCT 52.9 (H) 12/28/2018    MCV 87.3 12/28/2018     12/28/2018    NEUTROABS 4.20 12/28/2018       Lab Results   Component Value Date     12/28/2018    K 3.8 12/28/2018     12/28/2018    CO2 24.9 12/28/2018    BUN 19 12/28/2018    CREATININE 0.88 12/28/2018    GLUCOSE 115 (H) 12/28/2018    CALCIUM 9.2 12/28/2018    AST 73 (H) 12/28/2018     (H) 12/28/2018    ALKPHOS 67 12/28/2018    BILITOT 0.6 12/28/2018    PROTEINTOT 6.8 12/28/2018    ALBUMIN 4.10 12/28/2018     IMAGING  MRI brain with and without contrast (06/13/2016):  Impression: There are  "bilateral frontal changes related to prior surgery, right greater than left. There is some associated gliosis and minimal contrast enhancement in the superior leftward aspect of the surgical \"bed\". Most importantly, there has been no change since 02/22/2016.    MRI cyberknife brain with contrast (12/28/2016):  Impression: Areas of increased blood flow in the right frontal lobe in areas of abnormal contrast enhancement and therefore the abnormality seen on prior MRI is highly suspicious for tumor.    MRI brain with and without contrast (02/28/2017):  Impression: Slight interval increase in the size of the area of abnormal contrast enhancement diffusely throughout the right frontal region. The amount of surrounding edema is also increased in the interval. Findings may be related to progression of disease however postradiation changes cannot be excluded. The edema extends into the right basal ganglia and parietal lobe. No new area of abnormal contrast enhancement identified. [Per a neurosurgery read and evaluation that day, the intensity of the enhancement is somewhat lessened, and the flare appears to be about the same.]    MRI brain with and without contrast (04/27/2017):  Impression: Increased cerebral blood volume in the area of abnormal contrast enhancement most consistent with neovascularity and tumor progression. The size and surrounding edema is stable.    MRI brain with and without contrast (05/30/2017):  Impression:  1) Intense, enhancing lesion right frontal lobe with interval evidence of increasing mass effect and edema with increasing midline shift from right to left and increasing compression of the anterior horn right lateral ventricle. The size of the enhancing lesion is very slightly larger by careful comparison and measurement. The degree of overall edema in the right frontal lobe has increased and now crosses the midline to the left frontal lobe. There is midline shift right to left which has " increased somewhat since previous studies.  2) Therefore, the findings in the right frontal lobe are slowly progressive by multiple parameters. A distant lesion is not identified elsewhere.    CT cerebral perfusion with and without contrast (05/30/2017):  Impression:  1) Large mass lesion right frontal lobe exhibiting marked increase in permeability. There is mass effect and edema diffusely in the right frontal lobe with a high grade defect of cerebral blood flow with sporadic areas of preserved cerebral blood volume.  2) There is marked delay in the mean transit time and time to drain in the right frontal tumor and mass region.  3) No other cerebral insults, ischemic abnormalities or parametric map abnormalities are identified elsewhere, outside of the large right frontal mass.    MRI brain with and without contrast (09/19/2017):  Impression:  1) Continued, interval decrease in right frontal lobe postoperative findings with overall decreased vasogenic edema and hemorrhage from prior. No acute hemorrhage or enhancing soft tissue focus to suggest residual enhancing tumor. No evidence for distant, enhancing lesion.  2) No acute intracranial pathology.    MRI brain with and without contrast (11/14/17)  Impression:  1. Complex septated cystic mass with perimeter enhancement right frontal  region surrounded by gliotic increased FLAIR and T2 signal radiating  into the contralateral left frontal lobe and surrounding the mass in the  right frontal lobe. 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  2. The size of the right frontal cystic lesion, the degree of perimeter  enhancement and the overall degree of abnormal FLAIR and T2 signal in  the frontal lobes is stable without change or progression when compared  to previous studies of 09/19/2017.  3. Further, new or enhancing lesion elsewhere is not currently  identified. Therefore, the findings in the brain, more specifically in  the frontal lobes, are  "stable when compared to the most recent studies.    MRI brain with and without contrast (04/23/2018):  Impression: Stable appearance of cystic collection right frontal lobe with surrounding vasogenic edema and/or nonenhancing tumor. Peripheral enhancement is persistent; however, no new enhancement, progression or enhancing nodularity is identified. No abnormal enhancement otherwise noted. No developing hydrocephalus.    MRI brain with and without contrast (08/07/2018):  Impression: There has been unequivocal progression of disease when compared with the previous examination of 04/23/2018. Specifically, there is bifrontal cystic change which is stable. However, there is abnormal contrast enhancement posterior to the cystic change on the right involving the frontal lobe as well as the caudate lobe and lentiform nucleus. There is also significantly greater vasogenic edema involving the new abnormalities.    MRI brain with and without contrast (10/02/2018):  Impression: Increasing masslike enhancement, localized mass effect and edema in the right frontal lobe, consistent with enlarging neoplasm. [\"With particular attention to the flare sequence it appears to be relatively stable compared to the one eight weeks ago\" per neurosurgery.] No new abnormality is identified elsewhere.    MRI brain with and without contrast (11/27/2018):  Impression: Enlarging, and increasingly irregular, infiltrating mass centered in the right frontal lobe, with increasing right-to-left mass effect and effacement of the right frontal horn. The findings are consistent with enlarging, infiltrative primary neoplasm. No new disease is identified elsewhere in the brain.    CT head without contrast (12/28/2018):  Impression: Large area of decreased attenuation centered to the right of midline but crosses the midline and partially effacing the anterior horn of the right lateral ventricle. Appearance most concerning for progression of mass. No " hemorrhage.    IMPRESSION AND PLAN  Ms. Alvarez is a 43 y.o., white female with:  Glioma: Initially diagnosed in 2007, with recurrences in 2014, late 2016, and, most recently, summer 2017. Now status post resection x 3 (in 2007, 2014 and 06/21/2017) and localized radiation x 2 (adjuvantly in 2007; and, most recently, a Cyberknife boost delivered in January 2017). Since a third resection was felt to not be in her best interest (at least at the time, in early 2017) and she completed localized XRT, we agreed with neurosurgery's recommendation to start chemotherapy. She began Temodar (patient dose: 320 mg daily on days 1-5 of a 28 day cycle) by late January 2017 and completed five (5), qmonthly cycles. She tolerated this regimen overall well, with some mild, and manageable nausea and increasing fatigue her only noticeable side effects. With this treatment, her CBCs remained unremarkable with no developing cytopenias. Unfortunately, despite this therapy, she developed reworsening headaches; and a repeat MRI in late May 2017 was consistent with reprogressive disease. As a result, she underwent a repeat craniotomy on 06/21/17. She tolerated this debulking surgery well, and she was subsequently started on second-line, palliative therapy with [id number] Avastin. She began this treatment on 08/16/2017, completed twenty-one (21) cycles (due to her needing to undergo placement of an epidural spinal cord stimulator to treat her worsening, chronic back pain, a delay of about six weeks was required in between her eighth and ninth cycles). While this treatment plan had been working well throughout this time, for a total of about one year, the repeat MRI performed on 08/07/2018 (and summarized above) was unfortunately consistent with disease progression. Irinotecan was therefore added to her [id number] bevacizumab regimen. She received six (6) cycles of this combination and overall tolerated it well also. A repeat MRI performed on  10/02/2018 (and also summarized above) appeared relatively stable; however, unfortunately, a repeat MRI performed on 11/27/2018 (and also summarized above) showed clear signs of further disease progression. At that time, the irinotecan was replaced with carboplatin. However, despite this, her neurologic symptoms have continued to worsen, particularly over the course of the past couple of weeks; and the most recent repeat head imaging (a CT performed in the ED on 12/28/2018) shows additional progression. I had a long discussion with the patient and her neighbor in clinic today regarding these developments. Unfortunately, cytotoxic chemotherapy is now clearly causing more harm than good in the setting of such aggressive, refractory disease; and she would benefit the most from changing our goals of care to a focus on symptom management/comfort measures only. In this situation, home hospice would do the most to improve both her quality, and thereby quantity, of life. The patient was in agreement with this plan. Referral placed.    ACO Quality Measures:  a) The patient does not use (and has never used) tobacco products.  b) The patient's BMI is above normal parameters. Plan includes a referral to primary care.  c) The current outpatient and discharge medications have been reconciled for the patient.    It is a pleasure to participate in Ms. Devlin's care. Please do not hesitate to call with any questions or concerns that you may have.    A total of 30 minutes were spent coordinating this patient’s care in clinic today; more than 50% of this time was spent face-to-face with the patient and her neighbor, reviewing her interim medical history, discussing the results of the recent repeat CT of the brain and counseling on the current treatment recommendations. All questions were answered to her satisfaction.    FOLLOW UP  Referral made to home hospice. Follow up in our clinic prn.        This document was electronically signed  by JEFFY Pinto MD January 2, 2019 10:36 AM      CC: MD Cirilo Becerra MD Marta Hayne, MD Beverly Paige Neace, PA

## 2019-01-02 NOTE — PROGRESS NOTES
SS spoke with MD who request for  to arrange hospice services.  SS spoke with pt, daughter Tarsha, and neighbor Maya Ortiz.  Pt states that she is staying at home alone with 10 Y/O daughter currently and is having frequent falls.  Patient agreeable to hospice services being arranged.  Pt states that her mother Tona Montemayor is still living and assist as needed.  Pt's spouse travels during the week and is home on the weekend.  Pt states that she and spouse completed paperwork when daughter was young giving aunt Susi in [place] POA of daughter in the event of anything happening to parents.  Pt states that spouse has changed his mind and would like for daughter to live with his brother instead.  10 Y/O daughter states that she would prefer to stay with Aunt Janette in [place].  SS advised patient that she needs to have family meeting with mother, spouse, sister in law, brother in law, neighbor, and daughter regarding plans for patient and daughters care.  SS contacted [facility] [phone] to give hospice referral.  SS request hospice to arrange family meeting with family at home on Saturday.  Pt request time to be around 4:00 p.m.  SS will follow as needed.

## 2019-01-02 NOTE — PROGRESS NOTES
SS attempted several times to contact patients spouse Conor [phone] without success.  SS contacted [facility] [phone] per nursing supervisor Noemi to ask if contact has been made with patient this date.  Hospice states that pt request for hospice to wait until Saturday to come back and meet with patient and family to have family discussion.  SS will follow as needed.

## 2019-01-03 NOTE — PROGRESS NOTES
SS contacted patient this date at home phone [phone].  Pt states that she has fallen about five times since office visit yesterday.  Pt states that spouse plans to come home tonight and they are discussing selling their home and either moving in with her mom or other family members.  Pt states that hospice will be at her home on Saturday to discuss further services with her.  SS will follow as needed.

## 2020-11-02 PROBLEM — Z46.2 ENCOUNTER FOR FITTING OR ADJUSTMENT OF NEUROPACEMAKER: Status: ACTIVE | Noted: 2018-01-01

## 2025-05-08 NOTE — PROGRESS NOTES
"Chief Complaint: \"My pain was well controlled with the stimulator.\"    Brief History: Mrs. Marcelina Alvarez is a 42 y.o. female, who returns to the clinic for possible spinal cord stimulator reprogramming and removal of spinal cord stimulator trial leads placed on 12/11/2017. Ms. Marcelina Alvarez reports 80% relief of her chronic lower back and lower extremity pain along with remarkable functional improvement with the use of her stimulator device.   Pain level is rated as 2/10 with the stimulator \"turned on.”   Patient level ranges from 2/10 to 3/10 with the use of the spinal cord stimulator.    Patient did not take additional analgesics throughout her spinal cord stimulator trial. She has remained afebrile throughout the trial. Dressings are dry and intact. Patient denies any complications related to the procedure, any new symptoms or any new bladder or bowel problems. Patient denies  pain, numbness and weakness in the lower extremities.    Pain History Prior to SCS Trial:  Referring physician: Dr. David Gregorio  Reason for referral: Consultation for intractable chronic lower back pain secondary to postherpetic neuralgia.   Pain history: Patient reports a 9-year history of pain, which began after multiple (greater than 70) cases of shingles. Pain has progressed in intensity over the past 4 months. Her last outbreak was 6 weeks ago. Lesions are centered around the spine without spread into her legs.  Pain description: constant pain with intermittent exacerbation, described as aching and burning sensation.   Radiation of pain: The pain radiates into the posterior aspect of the gluteal, lateral aspect of the hips, lateral aspect of the thighs, lateral aspect of the legs, lateral aspect of the ankles and plantar and dorsal aspect of the feet.  Pain intensity today: 6/10  Average pain intensity last week: 9/10  Pain intensity ranges from: 5/10 to 10/10  Aggravating factors: Pain increases with heat.  Alleviating factors: " Pain decreases with nothing.   Associated symptoms:   Patient reports pain, numbness and sometimes weakness in the bilateral lower extremities.     Review of previous therapies and additional medical records:  Marcelina Alvarez has already failed the following measures, including:   Conservative measures: oral analgesics (extensive trials with oral analgesics including but not limited to gabapentin, Lyrica, opioids, amitriptyline),  physical therapy, heat, changing beds, steroid shots every 3 months that helps only for a few days.   Interventional measures: None  Surgical measures: No previous lumbar spine surgery  Marcelina Alvarez underwent neurosurgical  consultation with Dr. David Gregorio and was found not to be a surgical candidate.  Marcelina Alvarez presents with significant comorbidities including anxiety and depression, engaged in treatment, morbid obesity, hypertension, non-insulin dependent diabetes, PHN, GERD, headaches, astrocytoma, glioma, osteoarthritis, engaged in treatment.  In terms of current analgesics, Marcelina Alvarez takes: gabapentin, ibuprofen   I have reviewed Felipe Report #[id number] consistent to medication reconciliation.  Patient is very satisfied with the trial and would like to move forward with implantation of a spinal cord stimulator device.     Diagnostic Studies:    MRI LUMBAR SPINE WO CONTRAST- 11/24/2017. Mild increased lumbar lordosis. Otherwise, vertebral marrow signal alteration, fracture, subluxation  or intramedullary lesion is not identified. There is no evidence of dominant dural sac defect, lateralizing impingement or acute focal abnormality.  MRI THORACIC SPINE WO CONTRAST- 11/24/2017. Mild focal degenerative disc and arthropathic disease noted at T8-T9 producing mild dural sac effacement without dominant defect or lateralization. Slight increased thoracolumbar kyphosis. Otherwise, negative MR datasets of the thoracic spine thoracic cord and the epidural space.   MRI BRAIN W WO  CONTRAST- 11/14/2017. COMPARISON: Comparison is made to previous studies of 2 months ago, 09/19/2017.  FINDINGS: There is a complex septated cystic lesion in the right frontal area anterior to the rightward aspect of the genu of the corpus callosum and anterior to the anterior horn of the right lateral ventricle. This cystic mass is surrounded by reactive gliosis with increased T2 and FLAIR signal extending in both the right frontal and left frontal lobes across the midline. 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 On the enhanced datasets, there is perimeter enhancement of this cystic mass, however, the configuration size and degree of enhancement is stable. There is dural enhancement of the right frontal reflection, also stable. Enhancing lesion elsewhere is not currently identified. No other acute focal abnormalities are identified within the brain. There is no extracerebral collection or midline shift. The FLAIR datasets are  negative for advanced white matter disease. The flow-voids are within normal limits. The patient has ballooning of the sella turcica with near empty sella with marked pituitary atrophy, a finding that is stable from previous exams.  There is no evidence of acute restricted diffusion.  IMPRESSION: Complex septated cystic mass with perimeter enhancement right frontal region surrounded by gliotic increased FLAIR and T2 signal radiating into the contralateral left frontal lobe and surrounding the mass in the right frontal lobe. 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 The size of the right frontal cystic lesion, the degree of perimeter enhancement and the overall degree of abnormal FLAIR and T2 signal in the frontal lobes is stable without change or progression when compared to previous studies of 09/19/2017. Further, new or enhancing lesion elsewhere is not  "currently identified. Therefore, the findings in the brain, more specifically in the frontal lobes, are stable when compared to the most recent studies.     The following portions of the patient's history were reviewed and updated as appropriate: problem list, past medical history, past surgery history, social history, family history, medications, and allergies    Review of Systems   Musculoskeletal: Positive for back pain.   All other systems reviewed and are negative.    Pulse 80  Temp 98.6 °F (37 °C) (Temporal Artery )   Resp 20  Ht 157.5 cm (62\")  Wt 112 kg (248 lb)  SpO2 99%  BMI 45.36 kg/m2      Physical Exam   Neurologic Exam  Constitutional: Patient is oriented to person, place, and time. Vital signs are normal. Patient appears well-developed and well-nourished.   HENT: Head: Normocephalic and atraumatic. Eyes: Conjunctivae and lids are normal. Pupils: Equal, round, reactive to light.   Neck: Trachea normal. Neck supple. No JVD present.   Pulmonary Respiratory effort: No increased work of breathing or signs of respiratory distress. Auscultation of lungs: Clear to auscultation.   Cardiovascular Auscultation of heart: Normal rate and rhythm, normal S1 and S2, no murmurs.   Abdomen: The abdomen was soft and nontender. Bowel sounds were normal.   Musculoskeletal   Gait and station: Gait evaluation demonstrated a normal gait   Lumbar spine: The range of motion of the lumbar spine is full and without pain. Extension, flexion, lateral flexion, rotation of the lumbar spine did not increase or reproduce pain. Lumbar facet joint loading maneuvers are negative.   Bashir and Gaenslen's tests are negative   Piriformis maneuvers are negative   Palpation of the bilateral ischial tuberosities, unrevealing   Palpation of the bilateral greater trochanter, unrevealing   Examination of the Iliotibial band: unrevealing   Hip joints: The range of motion of the hip joints is full and without pain   Neurological: Patient is " alert and oriented to person, place, and time. Speech: speech is normal. Cortical function: Normal mental status.   Cranial nerves: Cranial nerves 2-12 intact.   Reflex Scores:  Right patellar: 2+  Left patellar: 2+  Right achilles: 1+  Left achilles: 1+  Motor strength: 5/5  Motor Tone: normal tone.   Involuntary movements: none.   Superficial/Primitive Reflexes: primitive reflexes were absent.   Right Smith: absent  Left Smith: absent  Right ankle clonus: absent  Left ankle clonus: absent   No nuchal rigidity. Negative Kernig and Brudzinski.  Spurling sign is negative. Lhermitte sign is negative. Negative long tract signs. Straight leg raising test is negative. Femoral stretch sign is negative.   Sensation: No sensory loss. Sensory exam: intact to light touch, intact pain and temperature sensation, intact vibration sensation and normal proprioception. There is some hyperalgesia and hyperesthesia in the lumbar region  Coordination: Normal finger to nose and heel to shin. Normal balance and negative. Romberg's sign negative.   Skin and subcutaneous tissue: Skin is warm and intact. No rash noted. No cyanosis.   Psychiatric: Judgment and insight: Normal. Orientation to person, place and time: Normal. Recent and remote memory: Intact. Mood and affect: Normal.      PROCEDURES:   Procedure #1: Analysis of the spinal cord stimulator device with complex spinal cord stimulator reprogramming   Patient used the [employer] spinal cord stimulator device 100% of the time since initiation of the trial phase on 12/11/2017. The spinal cord stimulator device was reprogrammed under my direct supervision and two programs were created by adjusting electrode polarities, amplitude, pulse width, pulse rate, BurstDR, micro-dosing, in the following fashion;  Program ONE (Best Program):    Electrode polarities: 13+, 14-, 15-, 16+   Amplitude: 1.5-2.5 mA     Pulse width: 1000 mcs  Rate: 40 Hz  IntraBurst rate: 500 Hz  Micro-dosing ratio:  30:90  Patient experienced significant pain relief with coverage with pleasant paresthesia in all areas of chronic pain.  Walking ability test performed revealing significant improvement of neurogenic claudication.  Time spent reprogrammin minutes  A copy of the telemetry report will be scanned in the patient's chart.    Procedure #2: Removal of spinal cord stimulator trial leads.   Two leads were removed with tips intact. There is no erythema, drainage, or fluid accumulation at the site. The area was cleansed with chlorhexidine.  A small Covaderm was applied.     ASSESSMENT:   1. Postherpetic neuralgia    2. Type 2 diabetes mellitus with complication, without long-term current use of insulin    3. Glioma    4. Portacath in place    5. Astrocytoma    6. Morbid obesity    7. Anxiety and depression      PLAN: Patient's chronic pain condition was significantly improved during her SCS trial. Therefore, I have proposed the following plan:  1. Referral to Dr. Lemons in consultation of a spinal cord stimulator device. I have recommended [employer] Penta paddle lead (Full Body MRI compatible)  with the top electrodes projecting at the level of the superior endplate of the T8 vertebral level based on current programming. I have recommended [employer] Proclaim 7 IPG Non-Rechargeable (Full Body MRI compatible). Patient will follow-up with me thereafter.  2. Recommendations for infection control prior to surgery, as follows:  · Start over-the-counter Hibiclens showers daily 5 days before surgery  · Chlorhexidine towelettes (given at time of Pre-Admission Testing appointment) night before and day of surgery  3. Long-term rehabilitation efforts:  A. The patient does not have a history of falls. I did complete a risk assessment for falls.   B. Patient will start a comprehensive physical therapy program for reconditioning, therapeutic exercise, core strengthening, gait and balance training, neurodynamics and desensitization  techniques 4-6 weeks after implantation of her SCS device.  C. Start an exercise program such as water therapy  D. Referral to Dr. Milo Esposito for psychological screening for spinal cord stimulation therapies.  E. Referral to [facility]  F. Referral to endocrinology, if possible in [place], in consultation for diabetes and obesity  4. The patient has been instructed to contact my office with any questions or difficulties. The patient understands the plan and agrees to proceed accordingly.      Patient Care Team:  TAYLOR Heard as PCP - General (Physician Assistant)  David Gregorio MD as Consulting Physician (Neurosurgery)  Lisseth Mckeon MD as Consulting Physician (Radiation Oncology)  JEFFY Pinto MD as Consulting Physician (Hematology and Oncology)  Burton Heaton MD as Consulting Physician (Pain Medicine)     No orders of the defined types were placed in this encounter.        Future Appointments  Date Time Provider Department Center   12/20/2017 10:30 AM  [facility] OP INFUS CHAIR 11  [facility] INF [facility]   12/20/2017 10:30 AM [facility]   12/20/2017 11:00 AM MCKENZIE Parson [facility]   1/9/2018 10:00 AM [facility] MRI 3T  [facility]   1/9/2018 12:00 PM David Gregorio MD [facility] NS [facility] None   1/17/2018 10:15 AM JEFFY Pinto MD [facility] COR         Burton Heaton MD      EMR Dragon/Transcription disclaimer:  Much of this encounter note is an electronic transcription of spoken language to printed text. Electronic transcription of spoken language may permit erroneous, or at times, nonsensical words or phrases to be inadvertently transcribed. Although I have reviewed the note for such errors, some may still exist.   [Normal] : normal gait, coordination grossly intact, no focal deficits and deep tendon reflexes were 2+ and symmetric [90553 - High Complexity requires an extensive number of possible diagnoses and/or the management options, extensive complexity of the medical data (tests, etc.) to be reviewed, and a high risk of significant complications, morbidity, and/or mortality as w] : High Complexity

## (undated) DEVICE — TOOL F2/8TA23 LEGEND 8CM 2.3MM TAPER: Brand: MIDAS REX™

## (undated) DEVICE — STRAIGHT TIP, UNIVERSAL

## (undated) DEVICE — MEDI-VAC YANKAUER SUCTION HANDLE W/BULBOUS TIP: Brand: CARDINAL HEALTH

## (undated) DEVICE — 3M™ STERI-STRIP™ REINFORCED ADHESIVE SKIN CLOSURES, R1547, 1/2 IN X 4 IN (12 MM X 100 MM), 6 STRIPS/ENVELOPE: Brand: 3M™ STERI-STRIP™

## (undated) DEVICE — PERFORATOR CRANI 14MM 11MM

## (undated) DEVICE — PK CRANI 10

## (undated) DEVICE — PROXIMATE RH ROTATING HEAD SKIN STAPLERS (35 WIDE) CONTAINS 35 STAINLESS STEEL STAPLES: Brand: PROXIMATE

## (undated) DEVICE — DISPOSABLE SUCTION CANISTER LINERS

## (undated) DEVICE — SENSR O2 OXIMAX FNGR A/ 18IN NONSTR

## (undated) DEVICE — ACCY PA700 LUBRICANT DIFFUSER MR7 4 PACK: Brand: MIDAS REX

## (undated) DEVICE — CANNULA,ADULT,SOFT-TOUCH,7TUBE,SC: Brand: MEDLINE

## (undated) DEVICE — SUT ETHLN 3/0 FS1 30IN 669H

## (undated) DEVICE — ADHS LIQ MASTISOL 2/3ML

## (undated) DEVICE — MEDI-VAC NON-CONDUCTIVE SUCTION TUBING: Brand: CARDINAL HEALTH

## (undated) DEVICE — ADAPT ST INFUS ADMIN SYR 70IN

## (undated) DEVICE — TOOL 14MH30D LEGEND 14CM 3MM MH DIAM: Brand: MIDAS REX ™

## (undated) DEVICE — SUT SILK 2/0 TIES 18IN A185H

## (undated) DEVICE — IRRIGATOR BULB ASEPTO 60CC STRL

## (undated) DEVICE — CANNULA,OXY,ADULT,SUPERSOFT,W/7'TUB,UC: Brand: MEDLINE

## (undated) DEVICE — SOL LR 1000ML

## (undated) DEVICE — AIRWY 90MM NO9

## (undated) DEVICE — SUT SILK 2/0 SH CR8 18IN CR8 C012D

## (undated) DEVICE — ANTIBACTERIAL UNDYED BRAIDED (POLYGLACTIN 910), SYNTHETIC ABSORBABLE SUTURE: Brand: COATED VICRYL

## (undated) DEVICE — 3M™ IOBAN™ 2 ANTIMICROBIAL INCISE DRAPE 6650EZ: Brand: IOBAN™ 2

## (undated) DEVICE — ENCORE® LATEX MICRO SIZE 7, STERILE LATEX POWDER-FREE SURGICAL GLOVE: Brand: ENCORE

## (undated) DEVICE — ENCORE® LATEX MICRO SIZE 7.5, STERILE LATEX POWDER-FREE SURGICAL GLOVE: Brand: ENCORE

## (undated) DEVICE — APPL DURAPREP IODOPHOR APL 26ML

## (undated) DEVICE — HDRST INTUB GENTLETOUCH SLOT 7IN RT

## (undated) DEVICE — HYDROGEL COATED LATEX URINE METER FOLEY TRAY,16 FR/CH (5.3 MM), 5 ML CATHETER PRE-CONNECTED TO 2000 ML DRAINAGE BAG WITH NEEDLE SAMPLING: Brand: DOVER

## (undated) DEVICE — APPL CHLORAPREP W/TINT 26ML BLU

## (undated) DEVICE — SPHR MARKR STEALTH STATION

## (undated) DEVICE — SUT VIC PLS CTD ANTIB BR 3/0 8/18IN 45CM

## (undated) DEVICE — DRV BATRY MATRIXPRO STRL

## (undated) DEVICE — DRSNG TELFA PAD NONADH STR 1S 3X8IN

## (undated) DEVICE — ELECTRD BLD EXT EDGE/INSUL 1P 4IN

## (undated) DEVICE — ENCORE® LATEX MICRO SIZE 6.5, STERILE LATEX POWDER-FREE SURGICAL GLOVE: Brand: ENCORE

## (undated) DEVICE — DRSNG WND BORDR/ADHS NONADHR/GZ LF 4X4IN STRL

## (undated) DEVICE — PK NEURO DISC 10

## (undated) DEVICE — SUT VIC 2/0 CT1 CR8 18IN J839D

## (undated) DEVICE — TOWEL,OR,DSP,ST,BLUE,STD,4/PK,20PK/CS: Brand: MEDLINE

## (undated) DEVICE — FLTR HME STR UNIV W/SMPL PORT

## (undated) DEVICE — SPNG GZ STRL 2S 4X4 12PLY

## (undated) DEVICE — 3M™ STERI-DRAPE™ INSTRUMENT POUCH 1018: Brand: STERI-DRAPE™

## (undated) DEVICE — SPNG GZ WOVN 4X4IN 12PLY 10/BX STRL

## (undated) DEVICE — BRAIN SPATULA, 7 7/8", (200 MM), DOUBLE ENDED, MALLEABLE, WIDTH: 10 MM, SILICONE, STERILE, DISPOSABLE, PACKAGE OF 10 PIECES: Brand: AESCULAP

## (undated) DEVICE — SUT VIC 3/0 SH CR8 18IN J864D

## (undated) DEVICE — WIPE THERAWASH SLV SPEC CARE 2PK

## (undated) DEVICE — 2963 MEDIPORE SOFT CLOTH TAPE 3 IN X 10 YD 12 RLS/CS: Brand: 3M™ MEDIPORE™

## (undated) DEVICE — Device

## (undated) DEVICE — DISPOSABLE TUBING SET AND EXTENDER FILTER TUBING

## (undated) DEVICE — LARYNG GLIDESCOPE COBALT/RANGER GVL3ST

## (undated) DEVICE — 3M™ WARMING BLANKET, LOWER BODY, 10 PER CASE, 42568: Brand: BAIR HUGGER™

## (undated) DEVICE — MAGNETIC DRAPE: Brand: DEVON

## (undated) DEVICE — COVADERM: Brand: DEROYAL

## (undated) DEVICE — TOOL 8TA11 LEGEND 8CM 1.1MM TA: Brand: MIDAS REX ™

## (undated) DEVICE — SYS SKIN CLS DERMABOND PRINEO W/22CM MESH TP